# Patient Record
Sex: MALE | Race: WHITE | NOT HISPANIC OR LATINO | Employment: PART TIME | URBAN - METROPOLITAN AREA
[De-identification: names, ages, dates, MRNs, and addresses within clinical notes are randomized per-mention and may not be internally consistent; named-entity substitution may affect disease eponyms.]

---

## 2017-05-04 ENCOUNTER — APPOINTMENT (EMERGENCY)
Dept: RADIOLOGY | Facility: HOSPITAL | Age: 52
End: 2017-05-04
Payer: COMMERCIAL

## 2017-05-04 ENCOUNTER — HOSPITAL ENCOUNTER (EMERGENCY)
Facility: HOSPITAL | Age: 52
Discharge: HOME | End: 2017-05-04
Admitting: EMERGENCY MEDICINE
Payer: COMMERCIAL

## 2017-05-04 VITALS
HEART RATE: 80 BPM | DIASTOLIC BLOOD PRESSURE: 65 MMHG | WEIGHT: 210 LBS | OXYGEN SATURATION: 100 % | SYSTOLIC BLOOD PRESSURE: 122 MMHG | TEMPERATURE: 98.2 F | RESPIRATION RATE: 18 BRPM

## 2017-05-04 DIAGNOSIS — J20.9 BRONCHITIS WITH ASTHMA, ACUTE: Primary | ICD-10-CM

## 2017-05-04 DIAGNOSIS — J45.909 BRONCHITIS WITH ASTHMA, ACUTE: Primary | ICD-10-CM

## 2017-05-04 LAB
ANION GAP SERPL CALCULATED.3IONS-SCNC: 10 MMOL/L (ref 4–13)
BASOPHILS # BLD AUTO: 0.03 THOUSANDS/ΜL (ref 0–0.1)
BASOPHILS NFR BLD AUTO: 0 % (ref 0–1)
BUN SERPL-MCNC: 14 MG/DL (ref 5–25)
CALCIUM SERPL-MCNC: 9.3 MG/DL (ref 8.3–10.1)
CHLORIDE SERPL-SCNC: 103 MMOL/L (ref 100–108)
CO2 SERPL-SCNC: 26 MMOL/L (ref 21–32)
CREAT SERPL-MCNC: 1.06 MG/DL (ref 0.6–1.3)
EOSINOPHIL # BLD AUTO: 0.31 THOUSAND/ΜL (ref 0–0.61)
EOSINOPHIL NFR BLD AUTO: 3 % (ref 0–6)
ERYTHROCYTE [DISTWIDTH] IN BLOOD BY AUTOMATED COUNT: 14.4 % (ref 11.6–15.1)
GFR SERPL CREATININE-BSD FRML MDRD: >60 ML/MIN/1.73SQ M
GLUCOSE SERPL-MCNC: 109 MG/DL (ref 65–140)
HCT VFR BLD AUTO: 47.2 % (ref 36.5–49.3)
HGB BLD-MCNC: 15.7 G/DL (ref 12–17)
LYMPHOCYTES # BLD AUTO: 2.55 THOUSANDS/ΜL (ref 0.6–4.47)
LYMPHOCYTES NFR BLD AUTO: 27 % (ref 14–44)
MCH RBC QN AUTO: 28.3 PG (ref 26.8–34.3)
MCHC RBC AUTO-ENTMCNC: 33.3 G/DL (ref 31.4–37.4)
MCV RBC AUTO: 85 FL (ref 82–98)
MONOCYTES # BLD AUTO: 0.72 THOUSAND/ΜL (ref 0.17–1.22)
MONOCYTES NFR BLD AUTO: 8 % (ref 4–12)
NEUTROPHILS # BLD AUTO: 5.8 THOUSANDS/ΜL (ref 1.85–7.62)
NEUTS SEG NFR BLD AUTO: 62 % (ref 43–75)
PLATELET # BLD AUTO: 334 THOUSANDS/UL (ref 149–390)
PMV BLD AUTO: 8.9 FL (ref 8.9–12.7)
POTASSIUM SERPL-SCNC: 4.2 MMOL/L (ref 3.5–5.3)
RBC # BLD AUTO: 5.55 MILLION/UL (ref 3.88–5.62)
SODIUM SERPL-SCNC: 139 MMOL/L (ref 136–145)
WBC # BLD AUTO: 9.41 THOUSAND/UL (ref 4.31–10.16)

## 2017-05-04 PROCEDURE — 94640 AIRWAY INHALATION TREATMENT: CPT

## 2017-05-04 PROCEDURE — 36415 COLL VENOUS BLD VENIPUNCTURE: CPT | Performed by: PHYSICIAN ASSISTANT

## 2017-05-04 PROCEDURE — 99285 EMERGENCY DEPT VISIT HI MDM: CPT

## 2017-05-04 PROCEDURE — 85025 COMPLETE CBC W/AUTO DIFF WBC: CPT | Performed by: PHYSICIAN ASSISTANT

## 2017-05-04 PROCEDURE — 71020 HB CHEST X-RAY 2VW FRONTAL&LATL: CPT

## 2017-05-04 PROCEDURE — 80048 BASIC METABOLIC PNL TOTAL CA: CPT | Performed by: PHYSICIAN ASSISTANT

## 2017-05-04 RX ORDER — ALBUTEROL SULFATE 2.5 MG/3ML
5 SOLUTION RESPIRATORY (INHALATION) ONCE
Status: COMPLETED | OUTPATIENT
Start: 2017-05-04 | End: 2017-05-04

## 2017-05-04 RX ORDER — AZITHROMYCIN 250 MG/1
TABLET, FILM COATED ORAL
Qty: 6 TABLET | Refills: 0 | Status: SHIPPED | OUTPATIENT
Start: 2017-05-04 | End: 2017-08-22

## 2017-05-04 RX ORDER — PREDNISONE 50 MG/1
50 TABLET ORAL DAILY
Qty: 4 TABLET | Refills: 0 | Status: SHIPPED | OUTPATIENT
Start: 2017-05-04 | End: 2017-05-08

## 2017-05-04 RX ORDER — ALBUTEROL SULFATE 90 UG/1
2 AEROSOL, METERED RESPIRATORY (INHALATION) EVERY 6 HOURS PRN
COMMUNITY

## 2017-05-04 RX ORDER — BENZONATATE 200 MG/1
200 CAPSULE ORAL 3 TIMES DAILY PRN
Qty: 21 CAPSULE | Refills: 0 | Status: SHIPPED | OUTPATIENT
Start: 2017-05-04 | End: 2017-05-11

## 2017-05-04 RX ADMIN — ALBUTEROL SULFATE 5 MG: 2.5 SOLUTION RESPIRATORY (INHALATION) at 14:47

## 2017-05-04 RX ADMIN — ALBUTEROL SULFATE 5 MG: 2.5 SOLUTION RESPIRATORY (INHALATION) at 15:59

## 2017-05-04 RX ADMIN — IPRATROPIUM BROMIDE 0.5 MG: 0.5 SOLUTION RESPIRATORY (INHALATION) at 16:00

## 2017-05-04 RX ADMIN — PREDNISONE 50 MG: 20 TABLET ORAL at 14:47

## 2017-05-04 RX ADMIN — IPRATROPIUM BROMIDE 0.5 MG: 0.5 SOLUTION RESPIRATORY (INHALATION) at 14:48

## 2017-08-22 ENCOUNTER — HOSPITAL ENCOUNTER (EMERGENCY)
Facility: HOSPITAL | Age: 52
Discharge: HOME | End: 2017-08-22
Attending: EMERGENCY MEDICINE
Payer: COMMERCIAL

## 2017-08-22 VITALS
TEMPERATURE: 98.3 F | WEIGHT: 225.53 LBS | DIASTOLIC BLOOD PRESSURE: 66 MMHG | OXYGEN SATURATION: 96 % | RESPIRATION RATE: 18 BRPM | SYSTOLIC BLOOD PRESSURE: 147 MMHG | HEART RATE: 77 BPM

## 2017-08-22 DIAGNOSIS — J45.901 ACUTE ASTHMA EXACERBATION: Primary | ICD-10-CM

## 2017-08-22 PROCEDURE — 99285 EMERGENCY DEPT VISIT HI MDM: CPT

## 2017-08-22 RX ORDER — OMEPRAZOLE 40 MG/1
40 CAPSULE, DELAYED RELEASE ORAL DAILY
COMMUNITY

## 2017-08-22 RX ORDER — ALBUTEROL SULFATE 2.5 MG/3ML
2.5 SOLUTION RESPIRATORY (INHALATION) ONCE
Status: COMPLETED | OUTPATIENT
Start: 2017-08-22 | End: 2017-08-22

## 2017-08-22 RX ORDER — DIAZEPAM 5 MG/1
5 TABLET ORAL EVERY 6 HOURS PRN
Status: ON HOLD | COMMUNITY
End: 2021-02-26 | Stop reason: SDUPTHER

## 2017-08-22 RX ORDER — ALBUTEROL SULFATE 90 UG/1
2 AEROSOL, METERED RESPIRATORY (INHALATION) ONCE
Status: COMPLETED | OUTPATIENT
Start: 2017-08-22 | End: 2017-08-22

## 2017-08-22 RX ORDER — ACETAMINOPHEN 325 MG/1
TABLET ORAL
Status: COMPLETED
Start: 2017-08-22 | End: 2017-08-22

## 2017-08-22 RX ORDER — SODIUM CHLORIDE FOR INHALATION 0.9 %
VIAL, NEBULIZER (ML) INHALATION
Status: COMPLETED
Start: 2017-08-22 | End: 2017-08-22

## 2017-08-22 RX ORDER — PREDNISONE 10 MG/1
40 TABLET ORAL DAILY
Qty: 20 TABLET | Refills: 0 | Status: SHIPPED | OUTPATIENT
Start: 2017-08-22 | End: 2017-08-27

## 2017-08-22 RX ADMIN — ALBUTEROL SULFATE 2 PUFF: 90 AEROSOL, METERED RESPIRATORY (INHALATION) at 11:56

## 2017-08-22 RX ADMIN — ALBUTEROL SULFATE 2.5 MG: 2.5 SOLUTION RESPIRATORY (INHALATION) at 10:41

## 2017-08-22 RX ADMIN — ACETAMINOPHEN 650 MG: 325 TABLET ORAL at 11:33

## 2017-08-22 RX ADMIN — ISODIUM CHLORIDE 3 ML: 0.03 SOLUTION RESPIRATORY (INHALATION) at 10:41

## 2018-02-17 ENCOUNTER — HOSPITAL ENCOUNTER (EMERGENCY)
Facility: HOSPITAL | Age: 53
Discharge: HOME | End: 2018-02-17
Attending: EMERGENCY MEDICINE | Admitting: EMERGENCY MEDICINE
Payer: COMMERCIAL

## 2018-02-17 VITALS
SYSTOLIC BLOOD PRESSURE: 109 MMHG | DIASTOLIC BLOOD PRESSURE: 63 MMHG | RESPIRATION RATE: 20 BRPM | OXYGEN SATURATION: 98 % | WEIGHT: 227 LBS | HEART RATE: 68 BPM

## 2018-02-17 DIAGNOSIS — R06.1 STRIDOR: Primary | ICD-10-CM

## 2018-02-17 DIAGNOSIS — T78.2XXA ANAPHYLAXIS, INITIAL ENCOUNTER: ICD-10-CM

## 2018-02-17 LAB
ATRIAL RATE: 79 BPM
P AXIS: 38 DEGREES
PR INTERVAL: 152 MS
QRS AXIS: 18 DEGREES
QRSD INTERVAL: 88 MS
QT INTERVAL: 360 MS
QTC INTERVAL: 412 MS
T WAVE AXIS: 44 DEGREES
VENTRICULAR RATE: 79 BPM

## 2018-02-17 PROCEDURE — 93005 ELECTROCARDIOGRAM TRACING: CPT

## 2018-02-17 PROCEDURE — 96372 THER/PROPH/DIAG INJ SC/IM: CPT

## 2018-02-17 PROCEDURE — 94640 AIRWAY INHALATION TREATMENT: CPT

## 2018-02-17 PROCEDURE — 93010 ELECTROCARDIOGRAM REPORT: CPT | Performed by: INTERNAL MEDICINE

## 2018-02-17 PROCEDURE — 99285 EMERGENCY DEPT VISIT HI MDM: CPT

## 2018-02-17 RX ORDER — ACETAMINOPHEN 325 MG/1
650 TABLET ORAL ONCE
Status: COMPLETED | OUTPATIENT
Start: 2018-02-17 | End: 2018-02-17

## 2018-02-17 RX ORDER — ALBUTEROL SULFATE 2.5 MG/3ML
5 SOLUTION RESPIRATORY (INHALATION) ONCE
Status: COMPLETED | OUTPATIENT
Start: 2018-02-17 | End: 2018-02-17

## 2018-02-17 RX ORDER — PREDNISONE 20 MG/1
40 TABLET ORAL DAILY
Qty: 8 TABLET | Refills: 0 | Status: SHIPPED | OUTPATIENT
Start: 2018-02-17 | End: 2018-02-21

## 2018-02-17 RX ORDER — EPINEPHRINE 1 MG/ML
0.5 INJECTION, SOLUTION, CONCENTRATE INTRAVENOUS ONCE
Status: COMPLETED | OUTPATIENT
Start: 2018-02-17 | End: 2018-02-17

## 2018-02-17 RX ORDER — EPINEPHRINE 0.3 MG/.3ML
0.3 INJECTION SUBCUTANEOUS ONCE
Qty: 0.3 ML | Refills: 0 | Status: SHIPPED | OUTPATIENT
Start: 2018-02-17 | End: 2022-01-14

## 2018-02-17 RX ORDER — METHYLPREDNISOLONE SODIUM SUCCINATE 125 MG/2ML
INJECTION, POWDER, LYOPHILIZED, FOR SOLUTION INTRAMUSCULAR; INTRAVENOUS
Status: COMPLETED
Start: 2018-02-17 | End: 2018-02-17

## 2018-02-17 RX ADMIN — ACETAMINOPHEN 650 MG: 325 TABLET, FILM COATED ORAL at 13:31

## 2018-02-17 RX ADMIN — EPINEPHRINE 0.5 MG: 1 INJECTION, SOLUTION INTRAMUSCULAR; SUBCUTANEOUS at 11:32

## 2018-02-17 RX ADMIN — ALBUTEROL SULFATE 5 MG: 2.5 SOLUTION RESPIRATORY (INHALATION) at 11:30

## 2018-02-17 RX ADMIN — METHYLPREDNISOLONE SODIUM SUCCINATE 125 MG: 125 INJECTION, POWDER, FOR SOLUTION INTRAMUSCULAR; INTRAVENOUS at 11:37

## 2018-02-17 RX ADMIN — IPRATROPIUM BROMIDE 0.5 MG: 0.5 SOLUTION RESPIRATORY (INHALATION) at 11:30

## 2018-02-17 NOTE — ED NOTES
Patient noticed in waiting room, with flushed face while shaking inhaler  Patient asked if he was okay, states "No! My daughter needs to be seen  I told them that I was allergic to perfume and I can't be out here " Pt heavily wheezing  Patient requested to sit in wheelchair, refused stating "Not until my daughter goes back!" Pt told daughter would be going back with him, but he should be seen at a patient due to current respiratory status  Patient continues to refuse  Patient instructed to sit in wheelchair again and wheeled back with daughter to room 26 at this time         Cole Rosario RN  02/17/18 [id number]

## 2018-02-17 NOTE — DISCHARGE INSTRUCTIONS
Anaphylaxis   WHAT YOU NEED TO KNOW:   Anaphylaxis is a life-threatening allergic reaction that must be treated immediately  Your risk for anaphylaxis increases if you have asthma that is severe or not controlled  Medical conditions such as heart disease can also increase your risk  It is important to be prepared if you are at risk for anaphylaxis  Your symptoms can be worse each time you are exposed to the trigger  DISCHARGE INSTRUCTIONS:   Steps to take for signs or symptoms of anaphylaxis:   · Immediately  give 1 shot of epinephrine only into the outer thigh muscle  · Leave the shot in place  as directed  Your healthcare provider may recommend you leave it in place for up to 10 seconds before you remove it  This helps make sure all of the epinephrine is delivered  · Call 911 and go to the emergency department,  even if the shot improved symptoms  Do not drive yourself  Bring the used epinephrine shot with you  Call 911 for any of the following:   · You have a skin rash, hives, swelling, or itching  · You have trouble breathing, shortness of breath, wheezing, or coughing  · Your throat tightens or your lips or tongue swell  · You have difficulty swallowing or speaking  · You are dizzy, lightheaded, confused, or feel like you are going to faint  · You have nausea, diarrhea, or abdominal cramps, or you are vomiting  Return to the emergency department if:   · Signs or symptoms of anaphylaxis return  Contact your healthcare provider if:   · You have questions or concerns about your condition or care  Medicines:   · Epinephrine  is used to treat severe allergic reactions such as anaphylaxis  · Medicines  such as antihistamines, steroids, and bronchodilators decrease inflammation, open airways, and make breathing easier  · Take your medicine as directed  Contact your healthcare provider if you think your medicine is not helping or if you have side effects   Tell him or her if you are allergic to any medicine  Keep a list of the medicines, vitamins, and herbs you take  Include the amounts, and when and why you take them  Bring the list or the pill bottles to follow-up visits  Carry your medicine list with you in case of an emergency  Follow up with your healthcare provider as directed: Allergy testing may reveal allergies that can trigger anaphylaxis  Write down your questions so you remember to ask them during your visits  Safety precautions:   · Keep 2 shots of epinephrine with you at all times  You may need a second shot, because epinephrine only works for about 20 minutes and symptoms may return  Your healthcare provider can show you and family members how to give the shot  Check the expiration date every month and replace it before it expires  · Create an action plan  Your healthcare provider can help you create a written plan that explains the allergy and an emergency plan to treat a reaction  The plan explains when to give a second epinephrine shot if symptoms return or do not improve after the first  Give copies of the action plan and emergency instructions to family members, work and school staff, and  providers  Show them how to give a shot of epinephrine  · Be careful when you exercise  If you have had exercise-induced anaphylaxis, do not exercise right after you eat  Stop exercising right away if you start to develop any signs or symptoms of anaphylaxis  You may first feel tired, warm, or have itchy skin  Hives, swelling, and severe breathing problems may develop if you continue to exercise  · Carry medical alert identification  Wear medical alert jewelry or carry a card that explains the allergy  Ask your healthcare provider where to get these items  · Identify and avoid known triggers  Read food labels for ingredients  Look for triggers in your environment  · Ask about treatments to prevent anaphylaxis    You may need allergy shots or other medicines to treat allergies  © 2017 [facility] Information is for End User's use only and may not be sold, redistributed or otherwise used for commercial purposes  All illustrations and images included in CareNotes® are the copyrighted property of A D A M , Inc  or Yousif Huang  The above information is an  only  It is not intended as medical advice for individual conditions or treatments  Talk to your doctor, nurse or pharmacist before following any medical regimen to see if it is safe and effective for you
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
## 2018-08-01 ENCOUNTER — OFFICE VISIT (OUTPATIENT)
Dept: PULMONOLOGY | Facility: CLINIC | Age: 53
End: 2018-08-01
Payer: COMMERCIAL

## 2018-08-01 VITALS
SYSTOLIC BLOOD PRESSURE: 124 MMHG | WEIGHT: 218 LBS | BODY MASS INDEX: 33.04 KG/M2 | DIASTOLIC BLOOD PRESSURE: 72 MMHG | TEMPERATURE: 99.4 F | OXYGEN SATURATION: 98 % | HEIGHT: 68 IN | RESPIRATION RATE: 20 BRPM | HEART RATE: 94 BPM

## 2018-08-01 DIAGNOSIS — J45.901 EXACERBATION OF ASTHMA, UNSPECIFIED ASTHMA SEVERITY, UNSPECIFIED WHETHER PERSISTENT: Primary | ICD-10-CM

## 2018-08-01 DIAGNOSIS — J84.10 GRANULOMATOUS LUNG DISEASE (HCC): ICD-10-CM

## 2018-08-01 PROCEDURE — 94640 AIRWAY INHALATION TREATMENT: CPT | Performed by: INTERNAL MEDICINE

## 2018-08-01 PROCEDURE — 99204 OFFICE O/P NEW MOD 45 MIN: CPT | Performed by: INTERNAL MEDICINE

## 2018-08-01 RX ORDER — ALBUTEROL SULFATE 2.5 MG/3ML
2.5 SOLUTION RESPIRATORY (INHALATION) ONCE
Status: COMPLETED | OUTPATIENT
Start: 2018-08-01 | End: 2018-08-01

## 2018-08-01 RX ORDER — FLUTICASONE PROPIONATE 50 MCG
1 SPRAY, SUSPENSION (ML) NASAL DAILY
COMMUNITY

## 2018-08-01 RX ORDER — CETIRIZINE HYDROCHLORIDE 10 MG/1
10 TABLET ORAL DAILY
COMMUNITY

## 2018-08-01 RX ADMIN — ALBUTEROL SULFATE 2.5 MG: 2.5 SOLUTION RESPIRATORY (INHALATION) at 16:27
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
## 2018-08-08 ENCOUNTER — HOSPITAL ENCOUNTER (OUTPATIENT)
Dept: CT IMAGING | Facility: HOSPITAL | Age: 53
Discharge: HOME | End: 2018-08-08
Attending: INTERNAL MEDICINE
Payer: COMMERCIAL

## 2018-08-08 DIAGNOSIS — J84.10 GRANULOMATOUS LUNG DISEASE (HCC): ICD-10-CM

## 2018-08-08 PROCEDURE — 71250 CT THORAX DX C-: CPT

## 2018-08-14 ENCOUNTER — HOSPITAL ENCOUNTER (OUTPATIENT)
Dept: PULMONOLOGY | Facility: HOSPITAL | Age: 53
Discharge: HOME | End: 2018-08-14
Attending: INTERNAL MEDICINE
Payer: COMMERCIAL

## 2018-08-14 DIAGNOSIS — J84.10 GRANULOMATOUS LUNG DISEASE (HCC): ICD-10-CM

## 2018-08-14 PROCEDURE — 94729 DIFFUSING CAPACITY: CPT | Performed by: INTERNAL MEDICINE

## 2018-08-14 PROCEDURE — 94726 PLETHYSMOGRAPHY LUNG VOLUMES: CPT

## 2018-08-14 PROCEDURE — 94060 EVALUATION OF WHEEZING: CPT

## 2018-08-14 PROCEDURE — 94060 EVALUATION OF WHEEZING: CPT | Performed by: INTERNAL MEDICINE

## 2018-08-14 PROCEDURE — 94729 DIFFUSING CAPACITY: CPT

## 2018-08-14 PROCEDURE — 94760 N-INVAS EAR/PLS OXIMETRY 1: CPT

## 2018-08-14 PROCEDURE — 94726 PLETHYSMOGRAPHY LUNG VOLUMES: CPT | Performed by: INTERNAL MEDICINE

## 2018-08-14 RX ORDER — ALBUTEROL SULFATE 2.5 MG/3ML
2.5 SOLUTION RESPIRATORY (INHALATION) ONCE
Status: COMPLETED | OUTPATIENT
Start: 2018-08-14 | End: 2018-08-14

## 2018-08-14 RX ADMIN — ALBUTEROL SULFATE 2.5 MG: 2.5 SOLUTION RESPIRATORY (INHALATION) at 12:46

## 2018-09-05 ENCOUNTER — OFFICE VISIT (OUTPATIENT)
Dept: PULMONOLOGY | Facility: CLINIC | Age: 53
End: 2018-09-05
Payer: COMMERCIAL

## 2018-09-05 VITALS
HEART RATE: 81 BPM | DIASTOLIC BLOOD PRESSURE: 70 MMHG | WEIGHT: 216 LBS | TEMPERATURE: 98 F | BODY MASS INDEX: 31.99 KG/M2 | HEIGHT: 69 IN | SYSTOLIC BLOOD PRESSURE: 110 MMHG | RESPIRATION RATE: 18 BRPM | OXYGEN SATURATION: 96 %

## 2018-09-05 DIAGNOSIS — R09.89 PULMONARY AIR TRAPPING: Primary | ICD-10-CM

## 2018-09-05 DIAGNOSIS — J98.4 RESTRICTIVE LUNG DISEASE: ICD-10-CM

## 2018-09-05 DIAGNOSIS — J45.909 MILD REACTIVE AIRWAYS DISEASE, UNSPECIFIED WHETHER PERSISTENT: ICD-10-CM

## 2018-09-05 PROCEDURE — 99214 OFFICE O/P EST MOD 30 MIN: CPT | Performed by: INTERNAL MEDICINE
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
## 2018-09-06 PROBLEM — J45.909 MILD REACTIVE AIRWAYS DISEASE: Status: ACTIVE | Noted: 2018-09-06

## 2018-09-06 PROBLEM — J98.4 RESTRICTIVE LUNG DISEASE: Status: ACTIVE | Noted: 2018-09-06

## 2018-09-06 PROBLEM — R09.89 PULMONARY AIR TRAPPING: Status: ACTIVE | Noted: 2018-09-06

## 2018-10-15 ENCOUNTER — HOSPITAL ENCOUNTER (EMERGENCY)
Facility: HOSPITAL | Age: 53
Discharge: HOME | End: 2018-10-15
Admitting: EMERGENCY MEDICINE
Payer: COMMERCIAL

## 2018-10-15 ENCOUNTER — APPOINTMENT (EMERGENCY)
Dept: RADIOLOGY | Facility: HOSPITAL | Age: 53
End: 2018-10-15
Payer: COMMERCIAL

## 2018-10-15 VITALS
RESPIRATION RATE: 18 BRPM | HEART RATE: 86 BPM | BODY MASS INDEX: 32.93 KG/M2 | OXYGEN SATURATION: 97 % | TEMPERATURE: 98.5 F | DIASTOLIC BLOOD PRESSURE: 73 MMHG | SYSTOLIC BLOOD PRESSURE: 122 MMHG | WEIGHT: 223 LBS

## 2018-10-15 DIAGNOSIS — R07.89 CHEST PAIN, NON-CARDIAC: ICD-10-CM

## 2018-10-15 DIAGNOSIS — J45.901 ASTHMA ATTACK: ICD-10-CM

## 2018-10-15 DIAGNOSIS — J45.909 MILD REACTIVE AIRWAYS DISEASE, UNSPECIFIED WHETHER PERSISTENT: ICD-10-CM

## 2018-10-15 DIAGNOSIS — J98.4 RESTRICTIVE LUNG DISEASE: Primary | ICD-10-CM

## 2018-10-15 LAB
ALBUMIN SERPL BCP-MCNC: 3.9 G/DL (ref 3.5–5)
ALP SERPL-CCNC: 88 U/L (ref 46–116)
ALT SERPL W P-5'-P-CCNC: 56 U/L (ref 12–78)
ANION GAP SERPL CALCULATED.3IONS-SCNC: 6 MMOL/L (ref 4–13)
AST SERPL W P-5'-P-CCNC: 28 U/L (ref 5–45)
BASOPHILS # BLD AUTO: 0.04 THOUSANDS/ΜL (ref 0–0.1)
BASOPHILS NFR BLD AUTO: 0 % (ref 0–1)
BILIRUB SERPL-MCNC: 0.5 MG/DL (ref 0.2–1)
BUN SERPL-MCNC: 15 MG/DL (ref 5–25)
CALCIUM SERPL-MCNC: 9.2 MG/DL (ref 8.3–10.1)
CHLORIDE SERPL-SCNC: 101 MMOL/L (ref 100–108)
CO2 SERPL-SCNC: 29 MMOL/L (ref 21–32)
CREAT SERPL-MCNC: 1.1 MG/DL (ref 0.6–1.3)
DEPRECATED D DIMER PPP: 277 NG/ML (FEU) (ref 0–424)
EOSINOPHIL # BLD AUTO: 0.24 THOUSAND/ΜL (ref 0–0.61)
EOSINOPHIL NFR BLD AUTO: 2 % (ref 0–6)
ERYTHROCYTE [DISTWIDTH] IN BLOOD BY AUTOMATED COUNT: 14.3 % (ref 11.6–15.1)
GFR SERPL CREATININE-BSD FRML MDRD: 76 ML/MIN/1.73SQ M
GLUCOSE SERPL-MCNC: 97 MG/DL (ref 65–140)
HCT VFR BLD AUTO: 51.7 % (ref 36.5–49.3)
HGB BLD-MCNC: 16.5 G/DL (ref 12–17)
IMM GRANULOCYTES # BLD AUTO: 0.05 THOUSAND/UL (ref 0–0.2)
IMM GRANULOCYTES NFR BLD AUTO: 0 % (ref 0–2)
LIPASE SERPL-CCNC: 88 U/L (ref 73–393)
LYMPHOCYTES # BLD AUTO: 2.64 THOUSANDS/ΜL (ref 0.6–4.47)
LYMPHOCYTES NFR BLD AUTO: 20 % (ref 14–44)
MCH RBC QN AUTO: 28.2 PG (ref 26.8–34.3)
MCHC RBC AUTO-ENTMCNC: 31.9 G/DL (ref 31.4–37.4)
MCV RBC AUTO: 88 FL (ref 82–98)
MONOCYTES # BLD AUTO: 1.2 THOUSAND/ΜL (ref 0.17–1.22)
MONOCYTES NFR BLD AUTO: 9 % (ref 4–12)
NEUTROPHILS # BLD AUTO: 8.98 THOUSANDS/ΜL (ref 1.85–7.62)
NEUTS SEG NFR BLD AUTO: 69 % (ref 43–75)
NRBC BLD AUTO-RTO: 0 /100 WBCS
PLATELET # BLD AUTO: 367 THOUSANDS/UL (ref 149–390)
PMV BLD AUTO: 9 FL (ref 8.9–12.7)
POTASSIUM SERPL-SCNC: 4.1 MMOL/L (ref 3.5–5.3)
PROT SERPL-MCNC: 7.9 G/DL (ref 6.4–8.2)
RBC # BLD AUTO: 5.85 MILLION/UL (ref 3.88–5.62)
SODIUM SERPL-SCNC: 136 MMOL/L (ref 136–145)
TROPONIN I SERPL-MCNC: <0.02 NG/ML
WBC # BLD AUTO: 13.15 THOUSAND/UL (ref 4.31–10.16)

## 2018-10-15 PROCEDURE — 99285 EMERGENCY DEPT VISIT HI MDM: CPT

## 2018-10-15 PROCEDURE — 71045 X-RAY EXAM CHEST 1 VIEW: CPT

## 2018-10-15 PROCEDURE — 85379 FIBRIN DEGRADATION QUANT: CPT | Performed by: PHYSICIAN ASSISTANT

## 2018-10-15 PROCEDURE — 84484 ASSAY OF TROPONIN QUANT: CPT | Performed by: PHYSICIAN ASSISTANT

## 2018-10-15 PROCEDURE — 36415 COLL VENOUS BLD VENIPUNCTURE: CPT | Performed by: PHYSICIAN ASSISTANT

## 2018-10-15 PROCEDURE — 85025 COMPLETE CBC W/AUTO DIFF WBC: CPT | Performed by: PHYSICIAN ASSISTANT

## 2018-10-15 PROCEDURE — 83690 ASSAY OF LIPASE: CPT | Performed by: PHYSICIAN ASSISTANT

## 2018-10-15 PROCEDURE — 80053 COMPREHEN METABOLIC PANEL: CPT | Performed by: PHYSICIAN ASSISTANT

## 2018-10-15 PROCEDURE — 94760 N-INVAS EAR/PLS OXIMETRY 1: CPT

## 2018-10-15 PROCEDURE — 93005 ELECTROCARDIOGRAM TRACING: CPT

## 2018-10-15 PROCEDURE — 94644 CONT INHLJ TX 1ST HOUR: CPT

## 2018-10-15 PROCEDURE — 96374 THER/PROPH/DIAG INJ IV PUSH: CPT

## 2018-10-15 RX ORDER — SODIUM CHLORIDE FOR INHALATION 0.9 %
VIAL, NEBULIZER (ML) INHALATION
Status: COMPLETED
Start: 2018-10-15 | End: 2018-10-15

## 2018-10-15 RX ORDER — ALBUTEROL SULFATE 2.5 MG/3ML
SOLUTION RESPIRATORY (INHALATION)
Status: DISCONTINUED
Start: 2018-10-15 | End: 2018-10-16 | Stop reason: HOSPADM

## 2018-10-15 RX ORDER — ALBUTEROL SULFATE 2.5 MG/3ML
SOLUTION RESPIRATORY (INHALATION)
Status: COMPLETED
Start: 2018-10-15 | End: 2018-10-15

## 2018-10-15 RX ORDER — PREDNISONE 20 MG/1
20 TABLET ORAL DAILY
Qty: 4 TABLET | Refills: 0 | Status: SHIPPED | OUTPATIENT
Start: 2018-10-15 | End: 2020-02-17 | Stop reason: SDUPTHER

## 2018-10-15 RX ORDER — KETOROLAC TROMETHAMINE 30 MG/ML
15 INJECTION, SOLUTION INTRAMUSCULAR; INTRAVENOUS ONCE
Status: COMPLETED | OUTPATIENT
Start: 2018-10-15 | End: 2018-10-15

## 2018-10-15 RX ORDER — METHYLPREDNISOLONE SODIUM SUCCINATE 125 MG/2ML
125 INJECTION, POWDER, LYOPHILIZED, FOR SOLUTION INTRAMUSCULAR; INTRAVENOUS ONCE
Status: COMPLETED | OUTPATIENT
Start: 2018-10-15 | End: 2018-10-15

## 2018-10-15 RX ORDER — 0.9 % SODIUM CHLORIDE 0.9 %
3 VIAL (ML) INJECTION AS NEEDED
Status: DISCONTINUED | OUTPATIENT
Start: 2018-10-15 | End: 2018-10-16 | Stop reason: HOSPADM

## 2018-10-15 RX ORDER — SODIUM CHLORIDE FOR INHALATION 0.9 %
3 VIAL, NEBULIZER (ML) INHALATION ONCE
Status: COMPLETED | OUTPATIENT
Start: 2018-10-15 | End: 2018-10-15

## 2018-10-15 RX ORDER — METHYLPREDNISOLONE SODIUM SUCCINATE 125 MG/2ML
125 INJECTION, POWDER, LYOPHILIZED, FOR SOLUTION INTRAMUSCULAR; INTRAVENOUS ONCE
Status: DISCONTINUED | OUTPATIENT
Start: 2018-10-15 | End: 2018-10-15

## 2018-10-15 RX ADMIN — METHYLPREDNISOLONE SODIUM SUCCINATE 125 MG: 125 INJECTION, POWDER, FOR SOLUTION INTRAMUSCULAR; INTRAVENOUS at 20:57

## 2018-10-15 RX ADMIN — KETOROLAC TROMETHAMINE 15 MG: 30 INJECTION, SOLUTION INTRAMUSCULAR at 22:05

## 2018-10-15 RX ADMIN — ISODIUM CHLORIDE 3 ML: 0.03 SOLUTION RESPIRATORY (INHALATION) at 19:44

## 2018-10-15 RX ADMIN — Medication 1 MG: at 19:44

## 2018-10-15 RX ADMIN — ALBUTEROL SULFATE 10 MG: 2.5 SOLUTION RESPIRATORY (INHALATION) at 19:42

## 2018-10-15 RX ADMIN — ALBUTEROL SULFATE 10 MG: 2.5 SOLUTION RESPIRATORY (INHALATION) at 19:43

## 2018-10-15 RX ADMIN — Medication 3 ML: at 19:44

## 2018-10-15 RX ADMIN — IPRATROPIUM BROMIDE 1 MG: 0.5 SOLUTION RESPIRATORY (INHALATION) at 19:44

## 2018-10-15 NOTE — ED PROVIDER NOTES
History  Chief Complaint   Patient presents with    Chest Pain     pt c/o chest pain starting today  also c/o back pain  started with wheezing and SOB starting tonight while entering ED  history of allergies to perfume  HPI   Patient is a 48-year-old male who comes complaining of back pain and rib pain arm started with severe asthma attack due to perfume used in waiting room, patient had significant wheezing and was unable to speak sentences, accessory muscle use  Patient has perfumes reaction in his history  Immediately called respiratory gave hour long neb treatment and Solu-Medrol 125 mg IV x1, patient improved significantly in a short period of time  Patient is a living in medical history of pancreatitis PEs restrictive lung disease from 911 seen by Dr Kenzie Motley pulmonary, also has a history of asthma patient is compliant with his medication  Due to an accident he also has significant neck and back pain  Prior to Admission Medications   Prescriptions Last Dose Informant Patient Reported? Taking? Cyclobenzaprine HCl (FLEXERIL PO)   Yes No   Sig: Take by mouth   EPINEPHrine (EPIPEN) 0 3 mg/0 3 mL SOAJ   No No   Sig: Inject 0 3 mL (0 3 mg total) into the shoulder, thigh, or buttocks once for 1 dose   Ketorolac Tromethamine (TORADOL ORAL PO)   Yes No   Sig: Take by mouth   Oxycodone-Acetaminophen (PERCOCET PO)   Yes No   Sig: Take by mouth   albuterol (PROVENTIL HFA,VENTOLIN HFA) 90 mcg/act inhaler   Yes No   Sig: Inhale 2 puffs every 6 (six) hours as needed for wheezing   cetirizine (ZyrTEC) 10 mg tablet   Yes No   Sig: Take 10 mg by mouth daily   diazepam (VALIUM) 5 mg tablet   Yes No   Sig: Take 5 mg by mouth every 6 (six) hours as needed for anxiety   fluticasone (FLONASE) 50 mcg/act nasal spray   Yes No   Si spray into each nostril daily   fluticasone-salmeterol (ADVAIR) 250-50 mcg/dose inhaler   Yes No   Sig: Inhale 1 puff every 12 (twelve) hours     omeprazole (PriLOSEC) 40 MG capsule Yes No   Sig: Take 40 mg by mouth daily      Facility-Administered Medications: None       Past Medical History:   Diagnosis Date    Asthma     GERD (gastroesophageal reflux disease)     Pancreatitis     Pulmonary embolism (HCC)     Spinal cord injury        Past Surgical History:   Procedure Laterality Date    SHOULDER SURGERY         Family History   Problem Relation Age of Onset    Heart disease Paternal Grandmother      I have reviewed and agree with the history as documented  Social History   Substance Use Topics    Smoking status: Never Smoker    Smokeless tobacco: Never Used    Alcohol use Yes      Comment: Occasional        Review of Systems   Constitutional: Negative for appetite change, diaphoresis, fatigue and fever  HENT: Negative for congestion, facial swelling, rhinorrhea, sneezing and tinnitus  Eyes: Negative for photophobia, pain and discharge  Respiratory: Positive for chest tightness, shortness of breath and wheezing  Negative for apnea and cough  Cardiovascular: Negative for chest pain and leg swelling  Gastrointestinal: Negative for abdominal distention, abdominal pain, diarrhea, nausea and vomiting  Endocrine: Negative for cold intolerance, polydipsia and polyphagia  Genitourinary: Negative for enuresis, frequency and hematuria  Musculoskeletal: Negative for arthralgias, gait problem and myalgias  Skin: Negative for color change and rash  Allergic/Immunologic: Negative for environmental allergies and food allergies  Neurological: Negative for syncope, speech difficulty, light-headedness and headaches  Hematological: Negative for adenopathy  Does not bruise/bleed easily  Psychiatric/Behavioral: Negative for behavioral problems, decreased concentration and hallucinations  The patient is not hyperactive  Physical Exam  Physical Exam   Constitutional: He is oriented to person, place, and time  He appears well-developed and well-nourished   He appears distressed  HENT:   Head: Normocephalic and atraumatic  Eyes: Pupils are equal, round, and reactive to light  EOM are normal    Neck: Normal range of motion  Neck supple  Cardiovascular: Normal rate and regular rhythm  Pulmonary/Chest: He is in respiratory distress  He has wheezes  He exhibits tenderness  Abdominal: Soft  Bowel sounds are normal  There is no tenderness  There is no guarding  Genitourinary: Penis normal    Musculoskeletal: Normal range of motion  Neurological: He is alert and oriented to person, place, and time  Skin: Skin is warm  Capillary refill takes less than 2 seconds  Psychiatric: He has a normal mood and affect  His behavior is normal    Nursing note and vitals reviewed        Vital Signs  ED Triage Vitals   Temperature Pulse Respirations Blood Pressure SpO2   10/15/18 1937 10/15/18 1937 10/15/18 1937 10/15/18 1937 10/15/18 1937   98 5 °F (36 9 °C) 101 20 164/85 99 %      Temp src Heart Rate Source Patient Position - Orthostatic VS BP Location FiO2 (%)   -- 10/15/18 2050 10/15/18 2050 10/15/18 2050 --    Monitor Sitting Right arm       Pain Score       10/15/18 1937       3           Vitals:    10/15/18 1937 10/15/18 2050 10/15/18 2100 10/15/18 2204   BP: 164/85 131/85 133/89 122/73   Pulse: 101 89 82 86   Patient Position - Orthostatic VS:  Sitting Sitting Lying       Visual Acuity      ED Medications  Medications   ipratropium (ATROVENT) 0 02 % inhalation solution **ADS Override Pull** (  Return to [facility] 10/15/18 2017)   albuterol (2 5 mg/3 mL) 0 083 % inhalation solution **ADS Override Pull** (  Return to [facility] 10/15/18 2017)   sodium chloride (PF) 0 9 % injection 3 mL (not administered)   albuterol (2 5 mg/3 mL) 0 083 % inhalation solution **ADS Override Pull** (10 mg  Given 10/15/18 1942)   albuterol inhalation solution 10 mg (10 mg Nebulization Given 10/15/18 1943)     And   ipratropium (ATROVENT) 0 02 % inhalation solution 1 mg (1 mg Nebulization Given 10/15/18 1944)     And   sodium chloride 0 9 % inhalation solution 3 mL (3 mL Nebulization Given 10/15/18 1944)   methylPREDNISolone sodium succinate (Solu-MEDROL) injection 125 mg (125 mg Intravenous Given 10/15/18 2057)   ketorolac (TORADOL) injection 15 mg (15 mg Intravenous Given 10/15/18 2205)       Diagnostic Studies  Results Reviewed     Procedure Component Value Units Date/Time    Troponin I [[id number]]  (Normal) Collected:  10/15/18 1930    Lab Status:  Final result Specimen:  Blood from Arm, Left Updated:  10/15/18 2017     Troponin I <0 02 ng/mL     Lipase [[id number]]  (Normal) Collected:  10/15/18 1930    Lab Status:  Final result Specimen:  Blood from Arm, Left Updated:  10/15/18 2015     Lipase 88 u/L     Comprehensive metabolic panel [[id number]] Collected:  10/15/18 1930    Lab Status:  Final result Specimen:  Blood from Arm, Left Updated:  10/15/18 2015     Sodium 136 mmol/L      Potassium 4 1 mmol/L      Chloride 101 mmol/L      CO2 29 mmol/L      ANION GAP 6 mmol/L      BUN 15 mg/dL      Creatinine 1 10 mg/dL      Glucose 97 mg/dL      Calcium 9 2 mg/dL      AST 28 U/L      ALT 56 U/L      Alkaline Phosphatase 88 U/L      Total Protein 7 9 g/dL      Albumin 3 9 g/dL      Total Bilirubin 0 50 mg/dL      eGFR 76 ml/min/1 73sq m     Narrative:         National Kidney Disease Education Program recommendations are as follows:  GFR calculation is accurate only with a steady state creatinine  Chronic Kidney disease less than 60 ml/min/1 73 sq  meters  Kidney failure less than 15 ml/min/1 73 sq  meters      D-dimer, quantitative [[id number]]  (Normal) Collected:  10/15/18 1930    Lab Status:  Final result Specimen:  Blood from Arm, Left Updated:  10/15/18 2013     D-Dimer, Quant 277 ng/ml (FEU)     CBC and differential [[id number]]  (Abnormal) Collected:  10/15/18 1930    Lab Status:  Final result Specimen:  Blood from Arm, Left Updated:  10/15/18 1958     WBC 13 15 (H) Thousand/uL      RBC 5 85 (H) Million/uL Hemoglobin 16 5 g/dL      Hematocrit 51 7 (H) %      MCV 88 fL      MCH 28 2 pg      MCHC 31 9 g/dL      RDW 14 3 %      MPV 9 0 fL      Platelets 661 Thousands/uL      nRBC 0 /100 WBCs      Neutrophils Relative 69 %      Immat GRANS % 0 %      Lymphocytes Relative 20 %      Monocytes Relative 9 %      Eosinophils Relative 2 %      Basophils Relative 0 %      Neutrophils Absolute 8 98 (H) Thousands/µL      Immature Grans Absolute 0 05 Thousand/uL      Lymphocytes Absolute 2 64 Thousands/µL      Monocytes Absolute 1 20 Thousand/µL      Eosinophils Absolute 0 24 Thousand/µL      Basophils Absolute 0 04 Thousands/µL                  XR chest portable    (Results Pending)              Procedures  Procedures       Phone Contacts  ED Phone Contact    ED Course  ED Course as of Oct 15 2220   Mon Oct 15, 2018   2019 D-DIMER QUANTITATIVE: 277   2020 D-DIMER QUANTITATIVE: [facility] D-DIMER QUANTITATIVE: 277      reassessment of patient after hour long treatment - room air oxygenation was 96-97% kept patient for another hour no respiratory compromise stated he feels fine to go home                               MDM  Number of Diagnoses or Management Options  Asthma attack: established and improving  Chest pain, non-cardiac: established and improving  Mild reactive airways disease, unspecified whether persistent: established and improving  Restrictive lung disease: established and improving     Amount and/or Complexity of Data Reviewed  Clinical lab tests: reviewed  Tests in the radiology section of CPT®: reviewed  Tests in the medicine section of CPT®: reviewed  Decide to obtain previous medical records or to obtain history from someone other than the patient: yes  Review and summarize past medical records: yes      CritCare Time    Disposition  Final diagnoses:   Restrictive lung disease   Asthma attack   Chest pain, non-cardiac   Mild reactive airways disease, unspecified whether persistent     Time reflects when diagnosis was documented in both MDM as applicable and the Disposition within this note     Time User Action Codes Description Comment    10/15/2018  9:56 PM Leia Hazard Add [J98 4] Restrictive lung disease     10/15/2018  9:56 PM Jewel Sánchez Add [J45 901] Asthma attack     10/15/2018  9:56 PM Jewel Sánchez Add [R07 89] Chest pain, non-cardiac     10/15/2018  9:59 PM Jewel Sánchez Add [J45 909] Mild reactive airways disease, unspecified whether persistent       ED Disposition     ED Disposition Condition Comment    Discharge  Donato Adler discharge to home/self care  Condition at discharge: Good        Follow-up Information     Follow up With Specialties Details Why [address], DO Internal Medicine Schedule an appointment as soon as possible for a visit in 2 days If symptoms worsen [address] [place] [address] [id number] [phone]            Patient's Medications   Discharge Prescriptions    PREDNISONE 20 MG TABLET    Take 1 tablet (20 mg total) by mouth daily       Start Date: 10/15/2018End Date: --       Order Dose: 20 mg       Quantity: 4 tablet    Refills: 0     No discharge procedures on file      ED Provider  Electronically Signed by           Andres Dupree PA-C  10/15/18 9269

## 2018-10-16 LAB
ATRIAL RATE: 83 BPM
P AXIS: 36 DEGREES
PR INTERVAL: 150 MS
QRS AXIS: 5 DEGREES
QRSD INTERVAL: 88 MS
QT INTERVAL: 342 MS
QTC INTERVAL: 401 MS
T WAVE AXIS: 49 DEGREES
VENTRICULAR RATE: 83 BPM

## 2018-10-16 PROCEDURE — 93010 ELECTROCARDIOGRAM REPORT: CPT | Performed by: INTERNAL MEDICINE

## 2018-10-16 NOTE — DISCHARGE INSTRUCTIONS
Asthma   WHAT YOU NEED TO KNOW:   Asthma is a lung disease that makes breathing difficult  Chronic inflammation and reactions to triggers narrow the airways in the lungs  Asthma can become life-threatening if it is not managed  DISCHARGE INSTRUCTIONS:   Return to the emergency department if:   · You have severe shortness of breath  · Your lips or nails turn blue or gray  · The skin around your neck and ribs pulls in with each breath  · You have shortness of breath, even after you take your short-term medicine as directed  · Your peak flow numbers are in the red zone of your AAP  Contact your healthcare provider if:   · You run out of medicine before your next refill is due  · Your symptoms get worse  · You need to take more medicine than usual to control your symptoms  · You have questions or concerns about your condition or care  Medicines:   · Medicines  decrease inflammation, open airways, and make it easier to breathe  Medicines may be inhaled, taken as a pill, or injected  Short-term medicines relieve your symptoms quickly  Long-term medicines are used to prevent future attacks  You may also need medicine to help control your allergies  Ask your healthcare provider for more information about the medicine you are given and how to take it safely  · Take your medicine as directed  Contact your healthcare provider if you think your medicine is not helping or if you have side effects  Tell him of her if you are allergic to any medicine  Keep a list of the medicines, vitamins, and herbs you take  Include the amounts, and when and why you take them  Bring the list or the pill bottles to follow-up visits  Carry your medicine list with you in case of an emergency  Follow up with your healthcare provider as directed: You will need to return to make sure your medicine is working and your symptoms are controlled   You may be referred to an asthma specialist  Kriss Upton may be asked to keep a record of your peak flow values and bring it with you to your appointments  Write down your questions so you remember to ask them during your visits  Manage your symptoms and prevent future attacks:   · Follow your Asthma Action Plan (AAP)  This is a written plan that you and your healthcare provider create  It explains which medicine you need and when to change doses if necessary  It also explains how you can monitor symptoms and use a peak flow meter  The meter measures how well your lungs are working  · Manage other health conditions , such as allergies, acid reflux, and sleep apnea  · Identify and avoid triggers  These may include pets, dust mites, mold, and cockroaches  · Do not smoke or be around others who smoke  Nicotine and other chemicals in cigarettes and cigars can cause lung damage  Ask your healthcare provider for information if you currently smoke and need help to quit  E-cigarettes or smokeless tobacco still contain nicotine  Talk to your healthcare provider before you use these products  · Ask about the flu vaccine  The flu can make your asthma worse  You may need a yearly flu shot  © 2017 [facility] Information is for End User's use only and may not be sold, redistributed or otherwise used for commercial purposes  All illustrations and images included in CareNotes® are the copyrighted property of [employer]  or [employer]  The above information is an  only  It is not intended as medical advice for individual conditions or treatments  Talk to your doctor, nurse or pharmacist before following any medical regimen to see if it is safe and effective for you  Chest Wall Pain   WHAT YOU NEED TO KNOW:   Chest wall pain may be caused by problems with the muscles, cartilage, or bones of the chest wall  Chest wall pain may also be caused by pain that spreads to your chest from another part of your body   The pain may be aching, severe, dull, or sharp  It may come and go, or it may be constant  The pain may be worse when you move in certain ways, breathe deeply, or cough  DISCHARGE INSTRUCTIONS:   Call 911 if:   · You have any of the following signs of a heart attack:      ¨ Squeezing, pressure, or pain in your chest that lasts longer than 5 minutes or returns    ¨ Discomfort or pain in your back, neck, jaw, stomach, or arm     ¨ Trouble breathing    ¨ Nausea or vomiting    ¨ Lightheadedness or a sudden cold sweat, especially with chest pain or trouble breathing    Return to the emergency department if:   · You have severe pain  Contact your healthcare provider if:   · You develop a rash  · You have other new symptoms  · Your pain does not improve, even with treatment  · You have questions or concerns about your condition or care  Medicines: You may need any of the following:  · NSAIDs , such as ibuprofen, help decrease swelling, pain, and fever  This medicine is available with or without a doctor's order  NSAIDs can cause stomach bleeding or kidney problems in certain people  If you take blood thinner medicine, always ask your healthcare provider if NSAIDs are safe for you  Always read the medicine label and follow directions  · Acetaminophen  decreases pain  It is available without a doctor's order  Ask how much to take and how often to take it  Follow directions  Acetaminophen can cause liver damage if not taken correctly  · A cream  may be applied to your chest to decrease pain  · Take your medicine as directed  Contact your healthcare provider if you think your medicine is not helping or if you have side effects  Tell him of her if you are allergic to any medicine  Keep a list of the medicines, vitamins, and herbs you take  Include the amounts, and when and why you take them  Bring the list or the pill bottles to follow-up visits  Carry your medicine list with you in case of an emergency    Follow up with your healthcare provider as directed:  Write down your questions so you remember to ask them during your visits  Self-care:   · Rest  as needed  Avoid activities that make your chest wall pain worse  · Apply heat  on your chest for 20 to 30 minutes every 2 hours for as many days as directed  Heat helps decrease pain and muscle spasms  · Apply ice  on your chest for 15 to 20 minutes every hour or as directed  Use an ice pack, or put crushed ice in a plastic bag  Cover it with a towel  Ice helps prevent tissue damage and decreases swelling and pain  © 2017 [facility] Information is for End User's use only and may not be sold, redistributed or otherwise used for commercial purposes  All illustrations and images included in CareNotes® are the copyrighted property of [employer]  or Yousif Huang  The above information is an  only  It is not intended as medical advice for individual conditions or treatments  Talk to your doctor, nurse or pharmacist before following any medical regimen to see if it is safe and effective for you

## 2019-09-08 ENCOUNTER — HOSPITAL ENCOUNTER (EMERGENCY)
Facility: HOSPITAL | Age: 54
Discharge: HOME | End: 2019-09-08
Attending: EMERGENCY MEDICINE
Payer: COMMERCIAL

## 2019-09-08 ENCOUNTER — APPOINTMENT (EMERGENCY)
Dept: CT IMAGING | Facility: HOSPITAL | Age: 54
End: 2019-09-08
Payer: COMMERCIAL

## 2019-09-08 VITALS
HEART RATE: 66 BPM | DIASTOLIC BLOOD PRESSURE: 80 MMHG | TEMPERATURE: 98 F | SYSTOLIC BLOOD PRESSURE: 150 MMHG | WEIGHT: 225.97 LBS | OXYGEN SATURATION: 98 % | RESPIRATION RATE: 20 BRPM | BODY MASS INDEX: 33.37 KG/M2

## 2019-09-08 DIAGNOSIS — R10.84 GENERALIZED ABDOMINAL PAIN: Primary | ICD-10-CM

## 2019-09-08 DIAGNOSIS — M54.50 NON-SPECIFIC LOW BACK PAIN: ICD-10-CM

## 2019-09-08 LAB
ALBUMIN SERPL BCP-MCNC: 3.6 G/DL (ref 3.5–5)
ALP SERPL-CCNC: 80 U/L (ref 46–116)
ALT SERPL W P-5'-P-CCNC: 36 U/L (ref 12–78)
ANION GAP SERPL CALCULATED.3IONS-SCNC: 7 MMOL/L (ref 4–13)
AST SERPL W P-5'-P-CCNC: 19 U/L (ref 5–45)
BASOPHILS # BLD AUTO: 0.05 THOUSANDS/ΜL (ref 0–0.1)
BASOPHILS NFR BLD AUTO: 1 % (ref 0–1)
BILIRUB SERPL-MCNC: 0.4 MG/DL (ref 0.2–1)
BILIRUB UR QL STRIP: NEGATIVE
BUN SERPL-MCNC: 13 MG/DL (ref 5–25)
CALCIUM SERPL-MCNC: 8.9 MG/DL (ref 8.3–10.1)
CHLORIDE SERPL-SCNC: 101 MMOL/L (ref 100–108)
CLARITY UR: CLEAR
CO2 SERPL-SCNC: 28 MMOL/L (ref 21–32)
COLOR UR: NORMAL
CREAT SERPL-MCNC: 1.11 MG/DL (ref 0.6–1.3)
EOSINOPHIL # BLD AUTO: 0.35 THOUSAND/ΜL (ref 0–0.61)
EOSINOPHIL NFR BLD AUTO: 3 % (ref 0–6)
ERYTHROCYTE [DISTWIDTH] IN BLOOD BY AUTOMATED COUNT: 14.4 % (ref 11.6–15.1)
GFR SERPL CREATININE-BSD FRML MDRD: 74 ML/MIN/1.73SQ M
GLUCOSE SERPL-MCNC: 103 MG/DL (ref 65–140)
GLUCOSE UR STRIP-MCNC: NEGATIVE MG/DL
HCT VFR BLD AUTO: 46.7 % (ref 36.5–49.3)
HGB BLD-MCNC: 15.2 G/DL (ref 12–17)
HGB UR QL STRIP.AUTO: NEGATIVE
IMM GRANULOCYTES # BLD AUTO: 0.03 THOUSAND/UL (ref 0–0.2)
IMM GRANULOCYTES NFR BLD AUTO: 0 % (ref 0–2)
KETONES UR STRIP-MCNC: NEGATIVE MG/DL
LEUKOCYTE ESTERASE UR QL STRIP: NEGATIVE
LIPASE SERPL-CCNC: 68 U/L (ref 73–393)
LYMPHOCYTES # BLD AUTO: 2.26 THOUSANDS/ΜL (ref 0.6–4.47)
LYMPHOCYTES NFR BLD AUTO: 21 % (ref 14–44)
MCH RBC QN AUTO: 29 PG (ref 26.8–34.3)
MCHC RBC AUTO-ENTMCNC: 32.5 G/DL (ref 31.4–37.4)
MCV RBC AUTO: 89 FL (ref 82–98)
MONOCYTES # BLD AUTO: 1 THOUSAND/ΜL (ref 0.17–1.22)
MONOCYTES NFR BLD AUTO: 9 % (ref 4–12)
NEUTROPHILS # BLD AUTO: 6.96 THOUSANDS/ΜL (ref 1.85–7.62)
NEUTS SEG NFR BLD AUTO: 66 % (ref 43–75)
NITRITE UR QL STRIP: NEGATIVE
NRBC BLD AUTO-RTO: 0 /100 WBCS
PH UR STRIP.AUTO: 7 [PH]
PLATELET # BLD AUTO: 311 THOUSANDS/UL (ref 149–390)
PMV BLD AUTO: 8.8 FL (ref 8.9–12.7)
POTASSIUM SERPL-SCNC: 4 MMOL/L (ref 3.5–5.3)
PROT SERPL-MCNC: 7.5 G/DL (ref 6.4–8.2)
PROT UR STRIP-MCNC: NEGATIVE MG/DL
RBC # BLD AUTO: 5.25 MILLION/UL (ref 3.88–5.62)
SODIUM SERPL-SCNC: 136 MMOL/L (ref 136–145)
SP GR UR STRIP.AUTO: <=1.005 (ref 1–1.03)
UROBILINOGEN UR QL STRIP.AUTO: 0.2 E.U./DL
WBC # BLD AUTO: 10.65 THOUSAND/UL (ref 4.31–10.16)

## 2019-09-08 PROCEDURE — 36415 COLL VENOUS BLD VENIPUNCTURE: CPT | Performed by: EMERGENCY MEDICINE

## 2019-09-08 PROCEDURE — 96361 HYDRATE IV INFUSION ADD-ON: CPT

## 2019-09-08 PROCEDURE — 81003 URINALYSIS AUTO W/O SCOPE: CPT | Performed by: EMERGENCY MEDICINE

## 2019-09-08 PROCEDURE — 96374 THER/PROPH/DIAG INJ IV PUSH: CPT

## 2019-09-08 PROCEDURE — 85025 COMPLETE CBC W/AUTO DIFF WBC: CPT | Performed by: EMERGENCY MEDICINE

## 2019-09-08 PROCEDURE — 83690 ASSAY OF LIPASE: CPT | Performed by: EMERGENCY MEDICINE

## 2019-09-08 PROCEDURE — 99284 EMERGENCY DEPT VISIT MOD MDM: CPT

## 2019-09-08 PROCEDURE — 74177 CT ABD & PELVIS W/CONTRAST: CPT

## 2019-09-08 PROCEDURE — 80053 COMPREHEN METABOLIC PANEL: CPT | Performed by: EMERGENCY MEDICINE

## 2019-09-08 PROCEDURE — 99284 EMERGENCY DEPT VISIT MOD MDM: CPT | Performed by: EMERGENCY MEDICINE

## 2019-09-08 PROCEDURE — 93005 ELECTROCARDIOGRAM TRACING: CPT

## 2019-09-08 RX ORDER — LIDOCAINE 50 MG/G
1 PATCH TOPICAL ONCE
Status: DISCONTINUED | OUTPATIENT
Start: 2019-09-08 | End: 2019-09-09 | Stop reason: HOSPADM

## 2019-09-08 RX ORDER — SODIUM CHLORIDE, SODIUM LACTATE, POTASSIUM CHLORIDE, CALCIUM CHLORIDE 600; 310; 30; 20 MG/100ML; MG/100ML; MG/100ML; MG/100ML
500 INJECTION, SOLUTION INTRAVENOUS CONTINUOUS
Status: DISCONTINUED | OUTPATIENT
Start: 2019-09-08 | End: 2019-09-09 | Stop reason: HOSPADM

## 2019-09-08 RX ORDER — KETOROLAC TROMETHAMINE 30 MG/ML
10 INJECTION, SOLUTION INTRAMUSCULAR; INTRAVENOUS ONCE
Status: COMPLETED | OUTPATIENT
Start: 2019-09-08 | End: 2019-09-08

## 2019-09-08 RX ADMIN — SODIUM CHLORIDE, SODIUM LACTATE, POTASSIUM CHLORIDE, AND CALCIUM CHLORIDE 500 ML: .6; .31; .03; .02 INJECTION, SOLUTION INTRAVENOUS at 21:55

## 2019-09-08 RX ADMIN — IOHEXOL 100 ML: 350 INJECTION, SOLUTION INTRAVENOUS at 22:16

## 2019-09-08 RX ADMIN — LIDOCAINE 1 PATCH: 50 PATCH TOPICAL at 21:32

## 2019-09-08 RX ADMIN — KETOROLAC TROMETHAMINE 9.9 MG: 30 INJECTION, SOLUTION INTRAMUSCULAR at 23:48

## 2019-09-09 LAB
ATRIAL RATE: 62 BPM
P AXIS: 43 DEGREES
PR INTERVAL: 156 MS
QRS AXIS: 17 DEGREES
QRSD INTERVAL: 90 MS
QT INTERVAL: 372 MS
QTC INTERVAL: 377 MS
T WAVE AXIS: 58 DEGREES
VENTRICULAR RATE: 62 BPM

## 2019-09-09 PROCEDURE — 93010 ELECTROCARDIOGRAM REPORT: CPT | Performed by: INTERNAL MEDICINE

## 2019-09-09 NOTE — ED PROCEDURE NOTE
PROCEDURE  ECG 12 Lead Documentation Only  Date/Time: 9/8/2019 10:01 PM  Performed by: Allan Berry MD  Authorized by: Allan Berry MD     Indications / Diagnosis:  UPPER ABD PAIN   ECG reviewed by me, the ED Provider: yes    Patient location:  ED and bedside  Previous ECG:     Previous ECG:  Compared to current    Comparison ECG info:  10/15/18    Similarity:  No change    Comparison to cardiac monitor: Yes    Interpretation:     Interpretation: non-specific    Rate:     ECG rate:  62    ECG rate assessment: normal    Rhythm:     Rhythm: sinus rhythm    Ectopy:     Ectopy: none    QRS:     QRS axis:  Normal    QRS intervals:  Normal  Conduction:     Conduction: normal    ST segments:     ST segments:  Normal  T waves:     T waves: flattening      Flattening:  AVL and V1  Other findings:     Other findings: U wave    Comments:      NO ECG SIGNS OF ISCHEMIA/ INJURY / R HEART STRAIN / Sandra Means MD  09/08/19 5079

## 2019-09-09 NOTE — ED PROVIDER NOTES
History  Chief Complaint   Patient presents with    Abdominal Pain     Pt  complains of generalized abdominal pain that radiates to back, started yesterday  LBM today  Denies N/V/D or difficulty urinating  53 YR MALE- C/O ACUTE O NSET OF GENERALIZED  MORE CONSTANT ABD CARINE N SINE YESTERDAY AT 3 PM -- WITH RADIATION TO BACK- MORE RIGHT LOWER BACK-- NO FEVERS- NO N/V/D- NO  COMPS- NO CP/SOB- NO PLEURITIC NATURE OF PAIN CC      History provided by:  Patient   used: No    Abdominal Pain   Pain location:  Generalized  Associated symptoms: no constipation, no diarrhea, no nausea and no vomiting        Prior to Admission Medications   Prescriptions Last Dose Informant Patient Reported? Taking? Cyclobenzaprine HCl (FLEXERIL PO)   Yes No   Sig: Take by mouth   EPINEPHrine (EPIPEN) 0 3 mg/0 3 mL SOAJ   No No   Sig: Inject 0 3 mL (0 3 mg total) into the shoulder, thigh, or buttocks once for 1 dose   Ketorolac Tromethamine (TORADOL ORAL PO)   Yes No   Sig: Take by mouth   Oxycodone-Acetaminophen (PERCOCET PO)   Yes No   Sig: Take by mouth   albuterol (PROVENTIL HFA,VENTOLIN HFA) 90 mcg/act inhaler   Yes No   Sig: Inhale 2 puffs every 6 (six) hours as needed for wheezing   cetirizine (ZyrTEC) 10 mg tablet   Yes No   Sig: Take 10 mg by mouth daily   diazepam (VALIUM) 5 mg tablet   Yes No   Sig: Take 5 mg by mouth every 6 (six) hours as needed for anxiety   fluticasone (FLONASE) 50 mcg/act nasal spray   Yes No   Si spray into each nostril daily   fluticasone-salmeterol (ADVAIR) 250-50 mcg/dose inhaler   Yes No   Sig: Inhale 1 puff every 12 (twelve) hours     omeprazole (PriLOSEC) 40 MG capsule   Yes No   Sig: Take 40 mg by mouth daily   predniSONE 20 mg tablet   No No   Sig: Take 1 tablet (20 mg total) by mouth daily      Facility-Administered Medications: None       Past Medical History:   Diagnosis Date    Asthma     GERD (gastroesophageal reflux disease)     Pancreatitis     Pulmonary embolism ([facility] [place] [address] )     Spinal cord injury        Past Surgical History:   Procedure Laterality Date    SHOULDER SURGERY         Family History   Problem Relation Age of Onset    Heart disease Paternal Grandmother      I have reviewed and agree with the history as documented  Social History     Tobacco Use    Smoking status: Never Smoker    Smokeless tobacco: Never Used   Substance Use Topics    Alcohol use: Yes     Comment: Occasional    Drug use: No        Review of Systems   Constitutional: Negative  HENT: Negative  Eyes: Negative  Respiratory: Negative  Cardiovascular: Negative  Gastrointestinal: Positive for abdominal pain  Negative for abdominal distention, anal bleeding, blood in stool, constipation, diarrhea, nausea, rectal pain and vomiting  Endocrine: Negative  Genitourinary: Negative  Musculoskeletal: Positive for back pain  Negative for arthralgias, gait problem, joint swelling, myalgias, neck pain and neck stiffness  Skin: Negative  Allergic/Immunologic: Negative  Neurological: Negative  Hematological: Negative  Psychiatric/Behavioral: Negative  Physical Exam  Physical Exam   Constitutional: He is oriented to person, place, and time  He appears well-developed and well-nourished  Non-toxic appearance  He does not appear ill  No distress  AVSS- PULSE OX 97 % ON RA- INTERPRETATION IS NORMAL- NO INTERVENTION- WELL APPEARING- IN NAD    HENT:   Head: Normocephalic and atraumatic  Eyes: Pupils are equal, round, and reactive to light  EOM are normal  No scleral icterus  MM PINK   Cardiovascular: Normal rate, regular rhythm, normal heart sounds and intact distal pulses  Exam reveals no gallop and no friction rub  No murmur heard  EQUAL BILATERAL RADALA/DP PULSES- NO BLE EDEMA/CALF TENDERNESS/ASYM/ ERYTHEMA   Pulmonary/Chest: Effort normal and breath sounds normal  No stridor  No respiratory distress  He has no wheezes  He has no rhonchi  He has no rales  He exhibits no tenderness  Abdominal: Soft  Normal appearance, normal aorta and bowel sounds are normal  He exhibits no distension, no pulsatile liver, no fluid wave, no abdominal bruit, no ascites, no pulsatile midline mass and no mass  There is no hepatosplenomegaly, splenomegaly or hepatomegaly  There is tenderness in the right lower quadrant, periumbilical area, suprapubic area and left lower quadrant  There is no rigidity, no rebound, no guarding, no CVA tenderness, no tenderness at McBurney's point and negative Barnes's sign  No hernia  Hernia confirmed negative in the ventral area, confirmed negative in the right inguinal area and confirmed negative in the left inguinal area  POS SUPRAUMBILICAL/ RLQ/LLQ/SUPRAPUBIC TENDERNESS- NO PERITONEAL SIGNS- NO C VA TENDERNESS- NO PULSATILE ABD MASS/BRUIT    - POS RIGHT LOWER PARASPINAL MUSCLE- LUMBAR TENDERNESS   Genitourinary: Testes normal and penis normal  Cremasteric reflex is present  Right testis shows no mass, no swelling and no tenderness  Left testis shows no mass, no swelling and no tenderness  Genitourinary Comments: NORMAL TESTICLE/SCROTLA/PERINEAL EXAM   Neurological: He is alert and oriented to person, place, and time  Skin: Skin is warm  Capillary refill takes less than 2 seconds  No rash noted  He is not diaphoretic  No cyanosis or erythema  No pallor  Psychiatric: He has a normal mood and affect  His behavior is normal    Nursing note and vitals reviewed        Vital Signs  ED Triage Vitals [09/08/19 2050]   Temperature Pulse Respirations Blood Pressure SpO2   98 °F (36 7 °C) 74 18 136/86 97 %      Temp Source Heart Rate Source Patient Position - Orthostatic VS BP Location FiO2 (%)   Oral Monitor Sitting Right arm --      Pain Score       6           Vitals:    09/08/19 2050   BP: 136/86   Pulse: 74   Patient Position - Orthostatic VS: Sitting         Visual Acuity      ED Medications  Medications   lidocaine (LIDODERM) 5 % patch 1 patch (1 patch Topical Medication Applied 9/8/19 2132)   lactated ringers infusion 500 mL (500 mL Intravenous New Bag 9/8/19 2155)   iohexol (OMNIPAQUE) 350 MG/ML injection (MULTI-DOSE) 100 mL (100 mL Intravenous Given 9/8/19 2216)       Diagnostic Studies  Results Reviewed     Procedure Component Value Units Date/Time    Comprehensive metabolic panel [[id number]] Collected:  09/08/19 2115    Lab Status:  Final result Specimen:  Blood from Arm, Right Updated:  09/08/19 2136     Sodium 136 mmol/L      Potassium 4 0 mmol/L      Chloride 101 mmol/L      CO2 28 mmol/L      ANION GAP 7 mmol/L      BUN 13 mg/dL      Creatinine 1 11 mg/dL      Glucose 103 mg/dL      Calcium 8 9 mg/dL      AST 19 U/L      ALT 36 U/L      Alkaline Phosphatase 80 U/L      Total Protein 7 5 g/dL      Albumin 3 6 g/dL      Total Bilirubin 0 40 mg/dL      eGFR 74 ml/min/1 73sq m     Narrative:       [place] guidelines for Chronic Kidney Disease (CKD):     Stage 1 with normal or high GFR (GFR > 90 mL/min/1 73 square meters)    Stage 2 Mild CKD (GFR = 60-89 mL/min/1 73 square meters)    Stage 3A Moderate CKD (GFR = 45-59 mL/min/1 73 square meters)    Stage 3B Moderate CKD (GFR = 30-44 mL/min/1 73 square meters)    Stage 4 Severe CKD (GFR = 15-29 mL/min/1 73 square meters)    Stage 5 End Stage CKD (GFR <15 mL/min/1 73 square meters)  Note: GFR calculation is accurate only with a steady state creatinine    Lipase [[id number]]  (Abnormal) Collected:  09/08/19 2115    Lab Status:  Final result Specimen:  Blood from Arm, Right Updated:  09/08/19 2136     Lipase 68 u/L     CBC and differential [[id number]]  (Abnormal) Collected:  09/08/19 2115    Lab Status:  Final result Specimen:  Blood from Arm, Right Updated:  09/08/19 2122     WBC 10 65 Thousand/uL      RBC 5 25 Million/uL      Hemoglobin 15 2 g/dL      Hematocrit 46 7 %      MCV 89 fL      MCH 29 0 pg      MCHC 32 5 g/dL      RDW 14 4 %      MPV 8 8 fL      Platelets 126 Thousands/uL      nRBC 0 /100 WBCs      Neutrophils Relative 66 %      Immat GRANS % 0 %      Lymphocytes Relative 21 %      Monocytes Relative 9 %      Eosinophils Relative 3 %      Basophils Relative 1 %      Neutrophils Absolute 6 96 Thousands/µL      Immature Grans Absolute 0 03 Thousand/uL      Lymphocytes Absolute 2 26 Thousands/µL      Monocytes Absolute 1 00 Thousand/µL      Eosinophils Absolute 0 35 Thousand/µL      Basophils Absolute 0 05 Thousands/µL     UA w Reflex to Microscopic [[id number]]     Lab Status:  No result Specimen:  Urine                  CT abdomen pelvis with contrast    (Results Pending)              Procedures  Procedures       ED Course  ED Course as of Sep 11 1310   Sun Sep 08, 2019   2107 Er md note-- pt offered pain medication - refuses at this point      2316 - ER MD NOTE- PT IS ASKING FOR IV TORADOL PRIOR TO ER D/C- WILL ORDER                                   MDM    Disposition  Final diagnoses:   None     ED Disposition     None      Follow-up Information    None         Patient's Medications   Discharge Prescriptions    No medications on file     No discharge procedures on file      ED Provider  Electronically Signed by           Igor Randolph MD  09/08/19 6957       Igor Randolph MD  09/11/19 7553

## 2019-09-09 NOTE — DISCHARGE INSTRUCTIONS
DIAGNOSIS; MID- LOWER ABDOMINAL PAIN // RIGHT LOW BACK PAIN     - ACTIVITY AS TOLERATED     - PLEASE RETURN TO  THE ER FOR ANY FEVERS- TEMP > 100 4/ ANY CHEST PAIN/ SHORTNESS OF BREATH/ ANY WORSENING ABDOMINAL PAIN / ANY  PERSISTENT VOMITING/ ANY BLOODY/COFFEE GROUND VOMITING/ ANY BLOODY/BLACK TARRY STOOLS  OR ANY NEW/ WORSENING/CONCERNING SYMPTOMS TO YOU

## 2020-02-17 ENCOUNTER — HOSPITAL ENCOUNTER (EMERGENCY)
Facility: HOSPITAL | Age: 55
Discharge: HOME | End: 2020-02-17
Attending: EMERGENCY MEDICINE | Admitting: EMERGENCY MEDICINE
Payer: COMMERCIAL

## 2020-02-17 ENCOUNTER — APPOINTMENT (EMERGENCY)
Dept: RADIOLOGY | Facility: HOSPITAL | Age: 55
End: 2020-02-17
Payer: COMMERCIAL

## 2020-02-17 VITALS
SYSTOLIC BLOOD PRESSURE: 127 MMHG | DIASTOLIC BLOOD PRESSURE: 89 MMHG | TEMPERATURE: 100 F | HEIGHT: 69 IN | HEART RATE: 108 BPM | BODY MASS INDEX: 33.47 KG/M2 | RESPIRATION RATE: 18 BRPM | OXYGEN SATURATION: 93 % | WEIGHT: 226 LBS

## 2020-02-17 DIAGNOSIS — J11.1 INFLUENZA: Primary | ICD-10-CM

## 2020-02-17 DIAGNOSIS — J45.901 ASTHMA EXACERBATION: ICD-10-CM

## 2020-02-17 LAB
ALBUMIN SERPL BCP-MCNC: 3.6 G/DL (ref 3.5–5)
ALP SERPL-CCNC: 73 U/L (ref 46–116)
ALT SERPL W P-5'-P-CCNC: 47 U/L (ref 12–78)
ANION GAP SERPL CALCULATED.3IONS-SCNC: 7 MMOL/L (ref 4–13)
AST SERPL W P-5'-P-CCNC: 31 U/L (ref 5–45)
ATRIAL RATE: 94 BPM
BASOPHILS # BLD AUTO: 0.03 THOUSANDS/ΜL (ref 0–0.1)
BASOPHILS NFR BLD AUTO: 1 % (ref 0–1)
BILIRUB SERPL-MCNC: 0.51 MG/DL (ref 0.2–1)
BUN SERPL-MCNC: 10 MG/DL (ref 5–25)
CALCIUM SERPL-MCNC: 8.6 MG/DL (ref 8.3–10.1)
CHLORIDE SERPL-SCNC: 98 MMOL/L (ref 100–108)
CO2 SERPL-SCNC: 27 MMOL/L (ref 21–32)
CREAT SERPL-MCNC: 1.11 MG/DL (ref 0.6–1.3)
EOSINOPHIL # BLD AUTO: 0.02 THOUSAND/ΜL (ref 0–0.61)
EOSINOPHIL NFR BLD AUTO: 0 % (ref 0–6)
ERYTHROCYTE [DISTWIDTH] IN BLOOD BY AUTOMATED COUNT: 14.7 % (ref 11.6–15.1)
FLUAV RNA NPH QL NAA+PROBE: DETECTED
FLUBV RNA NPH QL NAA+PROBE: ABNORMAL
GFR SERPL CREATININE-BSD FRML MDRD: 74 ML/MIN/1.73SQ M
GLUCOSE SERPL-MCNC: 95 MG/DL (ref 65–140)
HCT VFR BLD AUTO: 46.7 % (ref 36.5–49.3)
HGB BLD-MCNC: 15.1 G/DL (ref 12–17)
IMM GRANULOCYTES # BLD AUTO: 0.03 THOUSAND/UL (ref 0–0.2)
IMM GRANULOCYTES NFR BLD AUTO: 1 % (ref 0–2)
LIPASE SERPL-CCNC: 41 U/L (ref 73–393)
LYMPHOCYTES # BLD AUTO: 0.49 THOUSANDS/ΜL (ref 0.6–4.47)
LYMPHOCYTES NFR BLD AUTO: 8 % (ref 14–44)
MCH RBC QN AUTO: 28.7 PG (ref 26.8–34.3)
MCHC RBC AUTO-ENTMCNC: 32.3 G/DL (ref 31.4–37.4)
MCV RBC AUTO: 89 FL (ref 82–98)
MONOCYTES # BLD AUTO: 0.57 THOUSAND/ΜL (ref 0.17–1.22)
MONOCYTES NFR BLD AUTO: 9 % (ref 4–12)
NEUTROPHILS # BLD AUTO: 5.31 THOUSANDS/ΜL (ref 1.85–7.62)
NEUTS SEG NFR BLD AUTO: 81 % (ref 43–75)
NRBC BLD AUTO-RTO: 0 /100 WBCS
NT-PROBNP SERPL-MCNC: 95 PG/ML
P AXIS: 52 DEGREES
PLATELET # BLD AUTO: 241 THOUSANDS/UL (ref 149–390)
PMV BLD AUTO: 8.7 FL (ref 8.9–12.7)
POTASSIUM SERPL-SCNC: 4.1 MMOL/L (ref 3.5–5.3)
PR INTERVAL: 148 MS
PROT SERPL-MCNC: 7.6 G/DL (ref 6.4–8.2)
QRS AXIS: 33 DEGREES
QRSD INTERVAL: 92 MS
QT INTERVAL: 312 MS
QTC INTERVAL: 390 MS
RBC # BLD AUTO: 5.27 MILLION/UL (ref 3.88–5.62)
RSV RNA NPH QL NAA+PROBE: ABNORMAL
SODIUM SERPL-SCNC: 132 MMOL/L (ref 136–145)
T WAVE AXIS: 49 DEGREES
TROPONIN I SERPL-MCNC: <0.02 NG/ML
VENTRICULAR RATE: 94 BPM
WBC # BLD AUTO: 6.45 THOUSAND/UL (ref 4.31–10.16)

## 2020-02-17 PROCEDURE — 99285 EMERGENCY DEPT VISIT HI MDM: CPT

## 2020-02-17 PROCEDURE — 71045 X-RAY EXAM CHEST 1 VIEW: CPT

## 2020-02-17 PROCEDURE — 85025 COMPLETE CBC W/AUTO DIFF WBC: CPT | Performed by: EMERGENCY MEDICINE

## 2020-02-17 PROCEDURE — 96374 THER/PROPH/DIAG INJ IV PUSH: CPT

## 2020-02-17 PROCEDURE — 99283 EMERGENCY DEPT VISIT LOW MDM: CPT | Performed by: EMERGENCY MEDICINE

## 2020-02-17 PROCEDURE — 36415 COLL VENOUS BLD VENIPUNCTURE: CPT | Performed by: EMERGENCY MEDICINE

## 2020-02-17 PROCEDURE — 94760 N-INVAS EAR/PLS OXIMETRY 1: CPT

## 2020-02-17 PROCEDURE — 83690 ASSAY OF LIPASE: CPT | Performed by: EMERGENCY MEDICINE

## 2020-02-17 PROCEDURE — 87631 RESP VIRUS 3-5 TARGETS: CPT | Performed by: EMERGENCY MEDICINE

## 2020-02-17 PROCEDURE — 84484 ASSAY OF TROPONIN QUANT: CPT | Performed by: EMERGENCY MEDICINE

## 2020-02-17 PROCEDURE — 83880 ASSAY OF NATRIURETIC PEPTIDE: CPT | Performed by: EMERGENCY MEDICINE

## 2020-02-17 PROCEDURE — 80053 COMPREHEN METABOLIC PANEL: CPT | Performed by: EMERGENCY MEDICINE

## 2020-02-17 PROCEDURE — 94644 CONT INHLJ TX 1ST HOUR: CPT

## 2020-02-17 PROCEDURE — 93005 ELECTROCARDIOGRAM TRACING: CPT

## 2020-02-17 PROCEDURE — 96375 TX/PRO/DX INJ NEW DRUG ADDON: CPT

## 2020-02-17 PROCEDURE — 93010 ELECTROCARDIOGRAM REPORT: CPT | Performed by: INTERNAL MEDICINE

## 2020-02-17 RX ORDER — SODIUM CHLORIDE FOR INHALATION 0.9 %
3 VIAL, NEBULIZER (ML) INHALATION ONCE
Status: COMPLETED | OUTPATIENT
Start: 2020-02-17 | End: 2020-02-17

## 2020-02-17 RX ORDER — PREDNISONE 20 MG/1
40 TABLET ORAL DAILY
Qty: 8 TABLET | Refills: 0 | Status: SHIPPED | OUTPATIENT
Start: 2020-02-18 | End: 2020-02-22

## 2020-02-17 RX ORDER — OSELTAMIVIR PHOSPHATE 75 MG/1
75 CAPSULE ORAL ONCE
Status: COMPLETED | OUTPATIENT
Start: 2020-02-17 | End: 2020-02-17

## 2020-02-17 RX ORDER — METHYLPREDNISOLONE SODIUM SUCCINATE 125 MG/2ML
125 INJECTION, POWDER, LYOPHILIZED, FOR SOLUTION INTRAMUSCULAR; INTRAVENOUS ONCE
Status: COMPLETED | OUTPATIENT
Start: 2020-02-17 | End: 2020-02-17

## 2020-02-17 RX ORDER — OSELTAMIVIR PHOSPHATE 75 MG/1
75 CAPSULE ORAL EVERY 12 HOURS
Qty: 10 CAPSULE | Refills: 0 | Status: SHIPPED | OUTPATIENT
Start: 2020-02-17 | End: 2020-02-22

## 2020-02-17 RX ORDER — ONDANSETRON 4 MG/1
4 TABLET, ORALLY DISINTEGRATING ORAL EVERY 8 HOURS PRN
Qty: 20 TABLET | Refills: 0 | Status: SHIPPED | OUTPATIENT
Start: 2020-02-17

## 2020-02-17 RX ORDER — KETOROLAC TROMETHAMINE 30 MG/ML
15 INJECTION, SOLUTION INTRAMUSCULAR; INTRAVENOUS ONCE
Status: COMPLETED | OUTPATIENT
Start: 2020-02-17 | End: 2020-02-17

## 2020-02-17 RX ADMIN — OSELTAMIVIR PHOSPHATE 75 MG: 75 CAPSULE ORAL at 16:26

## 2020-02-17 RX ADMIN — ISODIUM CHLORIDE 3 ML: 0.03 SOLUTION RESPIRATORY (INHALATION) at 14:52

## 2020-02-17 RX ADMIN — METHYLPREDNISOLONE SODIUM SUCCINATE 125 MG: 125 INJECTION, POWDER, FOR SOLUTION INTRAMUSCULAR; INTRAVENOUS at 15:04

## 2020-02-17 RX ADMIN — KETOROLAC TROMETHAMINE 15 MG: 30 INJECTION, SOLUTION INTRAMUSCULAR at 16:23

## 2020-02-17 RX ADMIN — ALBUTEROL SULFATE 10 MG: 2.5 SOLUTION RESPIRATORY (INHALATION) at 14:52

## 2020-02-17 RX ADMIN — IPRATROPIUM BROMIDE 1 MG: 0.5 SOLUTION RESPIRATORY (INHALATION) at 14:53

## 2020-02-17 NOTE — ED PROVIDER NOTES
History  Chief Complaint   Patient presents with    Shortness of Breath     Pt reports 2 day hx of shortness of breath, reports asthma medications are not releiving symptoms  Pt reports "shallow tight" breahing  History provided by:  Patient   used: No    Shortness of Breath   Severity:  Moderate  Onset quality:  Gradual  Duration:  2 days  Timing:  Intermittent  Progression:  Waxing and waning  Chronicity:  New  Context: URI    Relieved by:  None tried  Worsened by:  Nothing  Ineffective treatments:  None tried  Associated symptoms: cough and wheezing    Associated symptoms: no abdominal pain, no chest pain, no fever, no neck pain, no rash and no sore throat    Risk factors: hx of PE/DVT        Prior to Admission Medications   Prescriptions Last Dose Informant Patient Reported? Taking? Cyclobenzaprine HCl (FLEXERIL PO)   Yes No   Sig: Take by mouth   EPINEPHrine (EPIPEN) 0 3 mg/0 3 mL SOAJ   No No   Sig: Inject 0 3 mL (0 3 mg total) into the shoulder, thigh, or buttocks once for 1 dose   Ketorolac Tromethamine (TORADOL ORAL PO)   Yes No   Sig: Take by mouth   Oxycodone-Acetaminophen (PERCOCET PO)   Yes No   Sig: Take by mouth   albuterol (PROVENTIL HFA,VENTOLIN HFA) 90 mcg/act inhaler   Yes No   Sig: Inhale 2 puffs every 6 (six) hours as needed for wheezing   cetirizine (ZyrTEC) 10 mg tablet   Yes No   Sig: Take 10 mg by mouth daily   diazepam (VALIUM) 5 mg tablet   Yes No   Sig: Take 5 mg by mouth every 6 (six) hours as needed for anxiety   fluticasone (FLONASE) 50 mcg/act nasal spray   Yes No   Si spray into each nostril daily   fluticasone-salmeterol (ADVAIR) 250-50 mcg/dose inhaler   Yes No   Sig: Inhale 1 puff every 12 (twelve) hours     omeprazole (PriLOSEC) 40 MG capsule   Yes No   Sig: Take 40 mg by mouth daily      Facility-Administered Medications: None       Past Medical History:   Diagnosis Date    Asthma     GERD (gastroesophageal reflux disease)     Pancreatitis  Pulmonary embolism ([facility] [address] )     Spinal cord injury        Past Surgical History:   Procedure Laterality Date    SHOULDER SURGERY         Family History   Problem Relation Age of Onset    Heart disease Paternal Grandmother      I have reviewed and agree with the history as documented  Social History     Tobacco Use    Smoking status: Never Smoker    Smokeless tobacco: Never Used   Substance Use Topics    Alcohol use: Yes     Comment: Occasional    Drug use: No       Review of Systems   Constitutional: Negative for activity change, appetite change, chills, fatigue and fever  HENT: Negative for congestion, dental problem, facial swelling, sore throat, tinnitus and trouble swallowing  Eyes: Negative for pain, discharge and itching  Respiratory: Positive for cough, shortness of breath and wheezing  Negative for apnea and chest tightness  Cardiovascular: Negative for chest pain, palpitations and leg swelling  Gastrointestinal: Negative for abdominal pain and nausea  Genitourinary: Negative for difficulty urinating, dysuria and flank pain  Musculoskeletal: Negative for arthralgias, back pain, gait problem, joint swelling and neck pain  Skin: Negative for color change, rash and wound  Neurological: Negative for dizziness and facial asymmetry  Psychiatric/Behavioral: Negative for agitation and behavioral problems  The patient is not nervous/anxious  All other systems reviewed and are negative  Physical Exam  Physical Exam   Constitutional: He is oriented to person, place, and time  He appears well-developed and well-nourished  No distress  HENT:   Head: Normocephalic and atraumatic  Right Ear: External ear normal    Left Ear: External ear normal    Eyes: Pupils are equal, round, and reactive to light  EOM are normal  Right eye exhibits no discharge  Left eye exhibits no discharge  Neck: Normal range of motion  Neck supple  No tracheal deviation present  No thyromegaly present  Cardiovascular: Normal rate and regular rhythm  No murmur heard  Pulmonary/Chest: Effort normal  He has wheezes  Abdominal: Soft  Bowel sounds are normal  He exhibits no distension  There is no tenderness  Musculoskeletal: Normal range of motion  He exhibits no edema or deformity  Right lower leg: He exhibits no edema  Left lower leg: He exhibits no edema  Neurological: He is alert and oriented to person, place, and time  No cranial nerve deficit  He exhibits normal muscle tone  Skin: Skin is warm  Capillary refill takes less than 2 seconds  He is not diaphoretic  Psychiatric: He has a normal mood and affect  His behavior is normal    Nursing note and vitals reviewed        Vital Signs  ED Triage Vitals   Temperature Pulse Respirations Blood Pressure SpO2   02/17/20 1232 02/17/20 1232 02/17/20 1232 02/17/20 1232 02/17/20 1232   100 °F (37 8 °C) (!) 106 20 137/77 95 %      Temp Source Heart Rate Source Patient Position - Orthostatic VS BP Location FiO2 (%)   02/17/20 1232 02/17/20 1508 02/17/20 1508 02/17/20 1508 --   Oral Monitor Sitting Right arm       Pain Score       02/17/20 1232       2           Vitals:    02/17/20 1508 02/17/20 1530 02/17/20 1600 02/17/20 1625   BP: 138/74 124/66 125/62 127/89   Pulse: 97 (!) 110 102 (!) 108   Patient Position - Orthostatic VS: Sitting Lying Lying Lying         Visual Acuity      ED Medications  Medications   albuterol inhalation solution 10 mg (10 mg Nebulization Given 2/17/20 1452)     And   ipratropium (ATROVENT) 0 02 % inhalation solution 1 mg (1 mg Nebulization Given 2/17/20 1453)     And   sodium chloride 0 9 % inhalation solution 3 mL (3 mL Nebulization Given 2/17/20 1452)   methylPREDNISolone sodium succinate (Solu-MEDROL) injection 125 mg (125 mg Intravenous Given 2/17/20 1504)   ketorolac (TORADOL) injection 15 mg (15 mg Intravenous Given 2/17/20 1623)   oseltamivir (TAMIFLU) capsule 75 mg (75 mg Oral Given 2/17/20 1626)       Diagnostic Studies  Results Reviewed     Procedure Component Value Units Date/Time    Influenza A/B and RSV PCR [[id number]]  (Abnormal) Collected:  02/17/20 1451    Lab Status:  Final result Specimen:  Nasopharyngeal Swab Updated:  02/17/20 1531     INFLUENZA A PCR Detected     INFLUENZA B PCR None Detected     RSV PCR None Detected    NT-BNP PRO [[id number]]  (Normal) Collected:  02/17/20 1448    Lab Status:  Final result Specimen:  Blood from Arm, Left Updated:  02/17/20 1518     NT-proBNP 95 pg/mL     Lipase [[id number]]  (Abnormal) Collected:  02/17/20 1448    Lab Status:  Final result Specimen:  Blood from Arm, Left Updated:  02/17/20 1518     Lipase 41 u/L     Comprehensive metabolic panel [[id number]]  (Abnormal) Collected:  02/17/20 1448    Lab Status:  Final result Specimen:  Blood from Arm, Left Updated:  02/17/20 1515     Sodium 132 mmol/L      Potassium 4 1 mmol/L      Chloride 98 mmol/L      CO2 27 mmol/L      ANION GAP 7 mmol/L      BUN 10 mg/dL      Creatinine 1 11 mg/dL      Glucose 95 mg/dL      Calcium 8 6 mg/dL      AST 31 U/L      ALT 47 U/L      Alkaline Phosphatase 73 U/L      Total Protein 7 6 g/dL      Albumin 3 6 g/dL      Total Bilirubin 0 51 mg/dL      eGFR 74 ml/min/1 73sq m     Narrative:       [place] guidelines for Chronic Kidney Disease (CKD):     Stage 1 with normal or high GFR (GFR > 90 mL/min/1 73 square meters)    Stage 2 Mild CKD (GFR = 60-89 mL/min/1 73 square meters)    Stage 3A Moderate CKD (GFR = 45-59 mL/min/1 73 square meters)    Stage 3B Moderate CKD (GFR = 30-44 mL/min/1 73 square meters)    Stage 4 Severe CKD (GFR = 15-29 mL/min/1 73 square meters)    Stage 5 End Stage CKD (GFR <15 mL/min/1 73 square meters)  Note: GFR calculation is accurate only with a steady state creatinine    Troponin I [[id number]]  (Normal) Collected:  02/17/20 1448    Lab Status:  Final result Specimen:  Blood from Arm, Left Updated:  02/17/20 1513     Troponin I <0 02 ng/mL CBC and differential [[id number]]  (Abnormal) Collected:  02/17/20 1448    Lab Status:  Final result Specimen:  Blood from Arm, Left Updated:  02/17/20 1458     WBC 6 45 Thousand/uL      RBC 5 27 Million/uL      Hemoglobin 15 1 g/dL      Hematocrit 46 7 %      MCV 89 fL      MCH 28 7 pg      MCHC 32 3 g/dL      RDW 14 7 %      MPV 8 7 fL      Platelets 692 Thousands/uL      nRBC 0 /100 WBCs      Neutrophils Relative 81 %      Immat GRANS % 1 %      Lymphocytes Relative 8 %      Monocytes Relative 9 %      Eosinophils Relative 0 %      Basophils Relative 1 %      Neutrophils Absolute 5 31 Thousands/µL      Immature Grans Absolute 0 03 Thousand/uL      Lymphocytes Absolute 0 49 Thousands/µL      Monocytes Absolute 0 57 Thousand/µL      Eosinophils Absolute 0 02 Thousand/µL      Basophils Absolute 0 03 Thousands/µL                  XR chest 1 view portable   Final Result by Rob Andre MD (02/17 1601)      Small left basilar atelectasis/infiltrate               Workstation performed: [id number]                    Procedures  ECG 12 Lead Documentation Only  Date/Time: 2/17/2020 4:38 PM  Performed by: Osmel Centeno MD  Authorized by: Osmel Centeno MD     Indications / Diagnosis:  Tachycardia  ECG reviewed by me, the ED Provider: yes    Patient location:  ED  Interpretation:     Interpretation: non-specific    Rate:     ECG rate:  94    ECG rate assessment: normal    Rhythm:     Rhythm: sinus rhythm    Ectopy:     Ectopy: none    QRS:     QRS axis:  Normal    QRS intervals:  Normal  Conduction:     Conduction: normal    ST segments:     ST segments:  Normal  T waves:     T waves: normal               ED Course                         Wells' Criteria for PE      Most Recent Value   Wells' Criteria for PE   Clinical signs and symptoms of DVT  0 Filed at: 02/17/2020 1516   PE is primary diagnosis or equally likely  3 Filed at: 02/17/2020 1516   HR >100  1 5 Filed at: 02/17/2020 1516   Immobilization at least 3 days or Surgery in the previous 4 weeks  0 Filed at: 02/17/2020 1516   Previous, objectively diagnosed PE or DVT  1 5 Filed at: 02/17/2020 1516   Hemoptysis  0 Filed at: 02/17/2020 1516   Malignancy with treatment within 6 months or palliative  0 Filed at: 02/17/2020 1516   Wells' Criteria Total  6 Filed at: 02/17/2020 1516            MDM  Number of Diagnoses or Management Options  Asthma exacerbation: new and requires workup  Influenza: new and requires workup  Diagnosis management comments: Two days of cough, congestion, wheezing  Not improved with home medications  History of asthma  Remote history of PE  No chest pain at this time  Initially tachycardic and borderline febrile  Initially ordered CTA chest due to elevated well score in setting of previous PE  Subsequently, laboratory studies revealed influenza positive  Chest x-ray obtained which showed no clear pneumonia  No leukocytosis  Suspect symptoms related to viral infections setting of asthma  He was given nebulizer treatment as well as prednisone and Tamiflu  I had extensive discussion with patient  Diffuse wheezing  Alternative diagnosis, cancelled CT PE after shared decision making with patient  Five days Tamiflu, prednisone recommended  Ibuprofen/tylenol for fever/body aches           Amount and/or Complexity of Data Reviewed  Clinical lab tests: ordered and reviewed  Tests in the radiology section of CPT®: ordered  Tests in the medicine section of CPT®: ordered and reviewed    Risk of Complications, Morbidity, and/or Mortality  Presenting problems: high  Diagnostic procedures: moderate  Management options: high    Patient Progress  Patient progress: improved        Disposition  Final diagnoses:   Influenza   Asthma exacerbation     Time reflects when diagnosis was documented in both MDM as applicable and the Disposition within this note     Time User Action Codes Description Comment    2/17/2020  4:28 PM Prudencio Oakes Add [J11 1] Influenza     2/17/2020  4:28 PM Stewart Warren Add [W53 005] Asthma exacerbation       ED Disposition     ED Disposition Condition Date/Time Comment    Discharge Stable Mon Feb 17, 2020  4:22 PM Donato Adler discharge to home/self care  Follow-up Information     Follow up With Specialties Details Why Contact Info    Rogelio Rojas DO Internal Medicine, Family Medicine Schedule an appointment as soon as possible for a visit in 3 days As needed [id number] [facility]  [address] [address] [phone]            Patient's Medications   Discharge Prescriptions    ONDANSETRON (ZOFRAN-ODT) 4 MG DISINTEGRATING TABLET    Take 1 tablet (4 mg total) by mouth every 8 (eight) hours as needed for nausea or vomiting       Start Date: 2/17/2020 End Date: --       Order Dose: 4 mg       Quantity: 20 tablet    Refills: 0    OSELTAMIVIR (TAMIFLU) 75 MG CAPSULE    Take 1 capsule (75 mg total) by mouth every 12 (twelve) hours for 5 days       Start Date: 2/17/2020 End Date: 2/22/2020       Order Dose: 75 mg       Quantity: 10 capsule    Refills: 0    PREDNISONE 20 MG TABLET    Take 2 tablets (40 mg total) by mouth daily for 4 days       Start Date: 2/18/2020 End Date: 2/22/2020       Order Dose: 40 mg       Quantity: 8 tablet    Refills: 0     No discharge procedures on file      PDMP Review     None          ED Provider  Electronically Signed by           Martin Johnson MD  02/17/20 8039

## 2020-06-09 ENCOUNTER — APPOINTMENT (EMERGENCY)
Dept: CT IMAGING | Facility: HOSPITAL | Age: 55
End: 2020-06-09
Payer: COMMERCIAL

## 2020-06-09 ENCOUNTER — HOSPITAL ENCOUNTER (EMERGENCY)
Facility: HOSPITAL | Age: 55
Discharge: HOME | End: 2020-06-09
Attending: EMERGENCY MEDICINE | Admitting: EMERGENCY MEDICINE
Payer: COMMERCIAL

## 2020-06-09 VITALS
RESPIRATION RATE: 16 BRPM | HEART RATE: 56 BPM | SYSTOLIC BLOOD PRESSURE: 112 MMHG | OXYGEN SATURATION: 98 % | TEMPERATURE: 98.3 F | DIASTOLIC BLOOD PRESSURE: 71 MMHG

## 2020-06-09 DIAGNOSIS — K62.5 RECTAL BLEEDING: ICD-10-CM

## 2020-06-09 DIAGNOSIS — K52.9 ACUTE COLITIS: Primary | ICD-10-CM

## 2020-06-09 LAB
ABO GROUP BLD: NORMAL
ALBUMIN SERPL BCP-MCNC: 3.5 G/DL (ref 3.5–5)
ALP SERPL-CCNC: 78 U/L (ref 46–116)
ALT SERPL W P-5'-P-CCNC: 31 U/L (ref 12–78)
ANION GAP SERPL CALCULATED.3IONS-SCNC: 7 MMOL/L (ref 4–13)
APTT PPP: 31 SECONDS (ref 23–37)
AST SERPL W P-5'-P-CCNC: 21 U/L (ref 5–45)
BASOPHILS # BLD AUTO: 0.03 THOUSANDS/ΜL (ref 0–0.1)
BASOPHILS NFR BLD AUTO: 0 % (ref 0–1)
BILIRUB SERPL-MCNC: 0.37 MG/DL (ref 0.2–1)
BLD GP AB SCN SERPL QL: NEGATIVE
BUN SERPL-MCNC: 18 MG/DL (ref 5–25)
CALCIUM SERPL-MCNC: 8.6 MG/DL (ref 8.3–10.1)
CHLORIDE SERPL-SCNC: 103 MMOL/L (ref 100–108)
CO2 SERPL-SCNC: 29 MMOL/L (ref 21–32)
CREAT SERPL-MCNC: 1.19 MG/DL (ref 0.6–1.3)
EOSINOPHIL # BLD AUTO: 0.25 THOUSAND/ΜL (ref 0–0.61)
EOSINOPHIL NFR BLD AUTO: 3 % (ref 0–6)
ERYTHROCYTE [DISTWIDTH] IN BLOOD BY AUTOMATED COUNT: 15 % (ref 11.6–15.1)
GFR SERPL CREATININE-BSD FRML MDRD: 68 ML/MIN/1.73SQ M
GLUCOSE SERPL-MCNC: 119 MG/DL (ref 65–140)
HCT VFR BLD AUTO: 48.1 % (ref 36.5–49.3)
HGB BLD-MCNC: 14.9 G/DL (ref 12–17)
IMM GRANULOCYTES # BLD AUTO: 0.03 THOUSAND/UL (ref 0–0.2)
IMM GRANULOCYTES NFR BLD AUTO: 0 % (ref 0–2)
INR PPP: 1.07 (ref 0.84–1.19)
LIPASE SERPL-CCNC: 53 U/L (ref 73–393)
LYMPHOCYTES # BLD AUTO: 2.23 THOUSANDS/ΜL (ref 0.6–4.47)
LYMPHOCYTES NFR BLD AUTO: 28 % (ref 14–44)
MCH RBC QN AUTO: 28 PG (ref 26.8–34.3)
MCHC RBC AUTO-ENTMCNC: 31 G/DL (ref 31.4–37.4)
MCV RBC AUTO: 90 FL (ref 82–98)
MONOCYTES # BLD AUTO: 0.7 THOUSAND/ΜL (ref 0.17–1.22)
MONOCYTES NFR BLD AUTO: 9 % (ref 4–12)
NEUTROPHILS # BLD AUTO: 4.71 THOUSANDS/ΜL (ref 1.85–7.62)
NEUTS SEG NFR BLD AUTO: 60 % (ref 43–75)
NRBC BLD AUTO-RTO: 0 /100 WBCS
PLATELET # BLD AUTO: 306 THOUSANDS/UL (ref 149–390)
PMV BLD AUTO: 9.2 FL (ref 8.9–12.7)
POTASSIUM SERPL-SCNC: 3.7 MMOL/L (ref 3.5–5.3)
PROT SERPL-MCNC: 7.2 G/DL (ref 6.4–8.2)
PROTHROMBIN TIME: 13.3 SECONDS (ref 11.6–14.5)
RBC # BLD AUTO: 5.33 MILLION/UL (ref 3.88–5.62)
RH BLD: POSITIVE
SODIUM SERPL-SCNC: 139 MMOL/L (ref 136–145)
SPECIMEN EXPIRATION DATE: NORMAL
WBC # BLD AUTO: 7.95 THOUSAND/UL (ref 4.31–10.16)

## 2020-06-09 PROCEDURE — 83690 ASSAY OF LIPASE: CPT | Performed by: PHYSICIAN ASSISTANT

## 2020-06-09 PROCEDURE — 85025 COMPLETE CBC W/AUTO DIFF WBC: CPT | Performed by: PHYSICIAN ASSISTANT

## 2020-06-09 PROCEDURE — 99284 EMERGENCY DEPT VISIT MOD MDM: CPT

## 2020-06-09 PROCEDURE — 99284 EMERGENCY DEPT VISIT MOD MDM: CPT | Performed by: PHYSICIAN ASSISTANT

## 2020-06-09 PROCEDURE — 85610 PROTHROMBIN TIME: CPT | Performed by: PHYSICIAN ASSISTANT

## 2020-06-09 PROCEDURE — 80053 COMPREHEN METABOLIC PANEL: CPT | Performed by: PHYSICIAN ASSISTANT

## 2020-06-09 PROCEDURE — 36415 COLL VENOUS BLD VENIPUNCTURE: CPT | Performed by: PHYSICIAN ASSISTANT

## 2020-06-09 PROCEDURE — 82272 OCCULT BLD FECES 1-3 TESTS: CPT

## 2020-06-09 PROCEDURE — 86900 BLOOD TYPING SEROLOGIC ABO: CPT | Performed by: PHYSICIAN ASSISTANT

## 2020-06-09 PROCEDURE — 86850 RBC ANTIBODY SCREEN: CPT | Performed by: PHYSICIAN ASSISTANT

## 2020-06-09 PROCEDURE — 74177 CT ABD & PELVIS W/CONTRAST: CPT

## 2020-06-09 PROCEDURE — 85730 THROMBOPLASTIN TIME PARTIAL: CPT | Performed by: PHYSICIAN ASSISTANT

## 2020-06-09 PROCEDURE — 86901 BLOOD TYPING SEROLOGIC RH(D): CPT | Performed by: PHYSICIAN ASSISTANT

## 2020-06-09 RX ORDER — CIPROFLOXACIN 500 MG/1
500 TABLET, FILM COATED ORAL EVERY 12 HOURS SCHEDULED
Qty: 14 TABLET | Refills: 0 | Status: SHIPPED | OUTPATIENT
Start: 2020-06-09 | End: 2020-06-16

## 2020-06-09 RX ORDER — CIPROFLOXACIN 500 MG/1
500 TABLET, FILM COATED ORAL EVERY 12 HOURS SCHEDULED
Qty: 14 TABLET | Refills: 0 | Status: SHIPPED | OUTPATIENT
Start: 2020-06-09 | End: 2020-06-09 | Stop reason: SDUPTHER

## 2020-06-09 RX ORDER — METRONIDAZOLE 500 MG/1
500 TABLET ORAL EVERY 8 HOURS SCHEDULED
Qty: 21 TABLET | Refills: 0 | Status: SHIPPED | OUTPATIENT
Start: 2020-06-09 | End: 2020-06-09 | Stop reason: SDUPTHER

## 2020-06-09 RX ORDER — METRONIDAZOLE 500 MG/1
500 TABLET ORAL EVERY 8 HOURS SCHEDULED
Qty: 21 TABLET | Refills: 0 | Status: SHIPPED | OUTPATIENT
Start: 2020-06-09 | End: 2020-06-16

## 2020-06-09 RX ADMIN — IOHEXOL 100 ML: 350 INJECTION, SOLUTION INTRAVENOUS at 15:52

## 2021-02-22 ENCOUNTER — APPOINTMENT (EMERGENCY)
Dept: CT IMAGING | Facility: HOSPITAL | Age: 56
DRG: 440 | End: 2021-02-22
Payer: COMMERCIAL

## 2021-02-22 ENCOUNTER — HOSPITAL ENCOUNTER (INPATIENT)
Facility: HOSPITAL | Age: 56
LOS: 4 days | Discharge: HOME | DRG: 440 | End: 2021-02-26
Attending: EMERGENCY MEDICINE | Admitting: INTERNAL MEDICINE
Payer: COMMERCIAL

## 2021-02-22 DIAGNOSIS — R10.84 GENERALIZED ABDOMINAL PAIN: ICD-10-CM

## 2021-02-22 DIAGNOSIS — K85.90 PANCREATITIS: Primary | ICD-10-CM

## 2021-02-22 DIAGNOSIS — K85.00 IDIOPATHIC ACUTE PANCREATITIS WITHOUT INFECTION OR NECROSIS: ICD-10-CM

## 2021-02-22 PROBLEM — Z65.5 EXPOSURE TO DISASTER, WAR AND OTHER HOSTILITIES: Chronic | Status: ACTIVE | Noted: 2021-02-22

## 2021-02-22 LAB
ALBUMIN SERPL BCP-MCNC: 3.8 G/DL (ref 3.5–5)
ALP SERPL-CCNC: 77 U/L (ref 46–116)
ALT SERPL W P-5'-P-CCNC: 42 U/L (ref 12–78)
ANION GAP SERPL CALCULATED.3IONS-SCNC: 6 MMOL/L (ref 4–13)
AST SERPL W P-5'-P-CCNC: 22 U/L (ref 5–45)
BASOPHILS # BLD AUTO: 0.03 THOUSANDS/ΜL (ref 0–0.1)
BASOPHILS NFR BLD AUTO: 0 % (ref 0–1)
BILIRUB SERPL-MCNC: 0.53 MG/DL (ref 0.2–1)
BILIRUB UR QL STRIP: NEGATIVE
BUN SERPL-MCNC: 13 MG/DL (ref 5–25)
CALCIUM SERPL-MCNC: 9 MG/DL (ref 8.3–10.1)
CHLORIDE SERPL-SCNC: 102 MMOL/L (ref 100–108)
CLARITY UR: CLEAR
CO2 SERPL-SCNC: 29 MMOL/L (ref 21–32)
COLOR UR: YELLOW
CREAT SERPL-MCNC: 1.12 MG/DL (ref 0.6–1.3)
EOSINOPHIL # BLD AUTO: 0.19 THOUSAND/ΜL (ref 0–0.61)
EOSINOPHIL NFR BLD AUTO: 2 % (ref 0–6)
ERYTHROCYTE [DISTWIDTH] IN BLOOD BY AUTOMATED COUNT: 14.7 % (ref 11.6–15.1)
GFR SERPL CREATININE-BSD FRML MDRD: 73 ML/MIN/1.73SQ M
GLUCOSE SERPL-MCNC: 149 MG/DL (ref 65–140)
GLUCOSE UR STRIP-MCNC: NEGATIVE MG/DL
HCT VFR BLD AUTO: 48.8 % (ref 36.5–49.3)
HGB BLD-MCNC: 15.5 G/DL (ref 12–17)
HGB UR QL STRIP.AUTO: NEGATIVE
HOLD SPECIMEN: NORMAL
IMM GRANULOCYTES # BLD AUTO: 0.04 THOUSAND/UL (ref 0–0.2)
IMM GRANULOCYTES NFR BLD AUTO: 0 % (ref 0–2)
KETONES UR STRIP-MCNC: NEGATIVE MG/DL
LEUKOCYTE ESTERASE UR QL STRIP: NEGATIVE
LIPASE SERPL-CCNC: 9684 U/L (ref 73–393)
LYMPHOCYTES # BLD AUTO: 1.45 THOUSANDS/ΜL (ref 0.6–4.47)
LYMPHOCYTES NFR BLD AUTO: 15 % (ref 14–44)
MCH RBC QN AUTO: 28.2 PG (ref 26.8–34.3)
MCHC RBC AUTO-ENTMCNC: 31.8 G/DL (ref 31.4–37.4)
MCV RBC AUTO: 89 FL (ref 82–98)
MONOCYTES # BLD AUTO: 0.58 THOUSAND/ΜL (ref 0.17–1.22)
MONOCYTES NFR BLD AUTO: 6 % (ref 4–12)
NEUTROPHILS # BLD AUTO: 7.47 THOUSANDS/ΜL (ref 1.85–7.62)
NEUTS SEG NFR BLD AUTO: 77 % (ref 43–75)
NITRITE UR QL STRIP: NEGATIVE
NRBC BLD AUTO-RTO: 0 /100 WBCS
PH UR STRIP.AUTO: 8.5 [PH] (ref 4.5–8)
PLATELET # BLD AUTO: 308 THOUSANDS/UL (ref 149–390)
PMV BLD AUTO: 8.9 FL (ref 8.9–12.7)
POTASSIUM SERPL-SCNC: 4 MMOL/L (ref 3.5–5.3)
PROT SERPL-MCNC: 7.4 G/DL (ref 6.4–8.2)
PROT UR STRIP-MCNC: NEGATIVE MG/DL
RBC # BLD AUTO: 5.5 MILLION/UL (ref 3.88–5.62)
SODIUM SERPL-SCNC: 137 MMOL/L (ref 136–145)
SP GR UR STRIP.AUTO: 1.01 (ref 1–1.03)
UROBILINOGEN UR QL STRIP.AUTO: 0.2 E.U./DL
WBC # BLD AUTO: 9.76 THOUSAND/UL (ref 4.31–10.16)

## 2021-02-22 PROCEDURE — 99223 1ST HOSP IP/OBS HIGH 75: CPT | Performed by: INTERNAL MEDICINE

## 2021-02-22 PROCEDURE — 81003 URINALYSIS AUTO W/O SCOPE: CPT

## 2021-02-22 PROCEDURE — 82787 IGG 1 2 3 OR 4 EACH: CPT | Performed by: PHYSICIAN ASSISTANT

## 2021-02-22 PROCEDURE — 36415 COLL VENOUS BLD VENIPUNCTURE: CPT

## 2021-02-22 PROCEDURE — 96361 HYDRATE IV INFUSION ADD-ON: CPT

## 2021-02-22 PROCEDURE — 99285 EMERGENCY DEPT VISIT HI MDM: CPT | Performed by: PHYSICIAN ASSISTANT

## 2021-02-22 PROCEDURE — 82784 ASSAY IGA/IGD/IGG/IGM EACH: CPT | Performed by: PHYSICIAN ASSISTANT

## 2021-02-22 PROCEDURE — G1004 CDSM NDSC: HCPCS

## 2021-02-22 PROCEDURE — 96374 THER/PROPH/DIAG INJ IV PUSH: CPT

## 2021-02-22 PROCEDURE — C9113 INJ PANTOPRAZOLE SODIUM, VIA: HCPCS | Performed by: PHYSICIAN ASSISTANT

## 2021-02-22 PROCEDURE — 83690 ASSAY OF LIPASE: CPT | Performed by: EMERGENCY MEDICINE

## 2021-02-22 PROCEDURE — 99285 EMERGENCY DEPT VISIT HI MDM: CPT

## 2021-02-22 PROCEDURE — 74177 CT ABD & PELVIS W/CONTRAST: CPT

## 2021-02-22 PROCEDURE — 85025 COMPLETE CBC W/AUTO DIFF WBC: CPT | Performed by: EMERGENCY MEDICINE

## 2021-02-22 PROCEDURE — 80053 COMPREHEN METABOLIC PANEL: CPT | Performed by: EMERGENCY MEDICINE

## 2021-02-22 RX ORDER — ONDANSETRON 2 MG/ML
4 INJECTION INTRAMUSCULAR; INTRAVENOUS EVERY 6 HOURS PRN
Status: DISCONTINUED | OUTPATIENT
Start: 2021-02-22 | End: 2021-02-26 | Stop reason: HOSPADM

## 2021-02-22 RX ORDER — FLUTICASONE PROPIONATE 50 MCG
1 SPRAY, SUSPENSION (ML) NASAL DAILY
Status: DISCONTINUED | OUTPATIENT
Start: 2021-02-22 | End: 2021-02-26 | Stop reason: HOSPADM

## 2021-02-22 RX ORDER — ONDANSETRON 2 MG/ML
4 INJECTION INTRAMUSCULAR; INTRAVENOUS ONCE
Status: COMPLETED | OUTPATIENT
Start: 2021-02-22 | End: 2021-02-22

## 2021-02-22 RX ORDER — PANTOPRAZOLE SODIUM 40 MG/1
40 INJECTION, POWDER, FOR SOLUTION INTRAVENOUS EVERY 12 HOURS SCHEDULED
Status: DISCONTINUED | OUTPATIENT
Start: 2021-02-22 | End: 2021-02-26 | Stop reason: HOSPADM

## 2021-02-22 RX ORDER — LORATADINE 10 MG/1
10 TABLET ORAL DAILY
COMMUNITY

## 2021-02-22 RX ORDER — SODIUM CHLORIDE, SODIUM LACTATE, POTASSIUM CHLORIDE, CALCIUM CHLORIDE 600; 310; 30; 20 MG/100ML; MG/100ML; MG/100ML; MG/100ML
50 INJECTION, SOLUTION INTRAVENOUS CONTINUOUS
Status: DISCONTINUED | OUTPATIENT
Start: 2021-02-22 | End: 2021-02-26 | Stop reason: HOSPADM

## 2021-02-22 RX ORDER — HYDROMORPHONE HCL/PF 1 MG/ML
0.5 SYRINGE (ML) INJECTION ONCE
Status: COMPLETED | OUTPATIENT
Start: 2021-02-22 | End: 2021-02-22

## 2021-02-22 RX ORDER — ALBUTEROL SULFATE 90 UG/1
2 AEROSOL, METERED RESPIRATORY (INHALATION) EVERY 6 HOURS PRN
Status: DISCONTINUED | OUTPATIENT
Start: 2021-02-22 | End: 2021-02-26 | Stop reason: HOSPADM

## 2021-02-22 RX ORDER — FLUTICASONE FUROATE AND VILANTEROL 200; 25 UG/1; UG/1
1 POWDER RESPIRATORY (INHALATION) DAILY
Status: DISCONTINUED | OUTPATIENT
Start: 2021-02-23 | End: 2021-02-26 | Stop reason: HOSPADM

## 2021-02-22 RX ORDER — HYDROMORPHONE HCL/PF 1 MG/ML
1 SYRINGE (ML) INJECTION EVERY 4 HOURS PRN
Status: DISCONTINUED | OUTPATIENT
Start: 2021-02-22 | End: 2021-02-25

## 2021-02-22 RX ADMIN — PANTOPRAZOLE SODIUM 40 MG: 40 INJECTION, POWDER, FOR SOLUTION INTRAVENOUS at 21:00

## 2021-02-22 RX ADMIN — ONDANSETRON 4 MG: 2 INJECTION INTRAMUSCULAR; INTRAVENOUS at 23:03

## 2021-02-22 RX ADMIN — TRIMETHOBENZAMIDE HYDROCHLORIDE 200 MG: 100 INJECTION INTRAMUSCULAR at 18:29

## 2021-02-22 RX ADMIN — HYDROMORPHONE HYDROCHLORIDE 1 MG: 1 INJECTION, SOLUTION INTRAMUSCULAR; INTRAVENOUS; SUBCUTANEOUS at 20:59

## 2021-02-22 RX ADMIN — ONDANSETRON 4 MG: 2 INJECTION INTRAMUSCULAR; INTRAVENOUS at 16:50

## 2021-02-22 RX ADMIN — HYDROMORPHONE HYDROCHLORIDE 0.5 MG: 1 INJECTION, SOLUTION INTRAMUSCULAR; INTRAVENOUS; SUBCUTANEOUS at 13:06

## 2021-02-22 RX ADMIN — IOHEXOL 100 ML: 350 INJECTION, SOLUTION INTRAVENOUS at 13:35

## 2021-02-22 RX ADMIN — ONDANSETRON 4 MG: 2 INJECTION INTRAMUSCULAR; INTRAVENOUS at 14:30

## 2021-02-22 RX ADMIN — SODIUM CHLORIDE, SODIUM LACTATE, POTASSIUM CHLORIDE, AND CALCIUM CHLORIDE 150 ML/HR: .6; .31; .03; .02 INJECTION, SOLUTION INTRAVENOUS at 16:20

## 2021-02-22 RX ADMIN — SODIUM CHLORIDE 1000 ML: 0.9 INJECTION, SOLUTION INTRAVENOUS at 13:06

## 2021-02-22 RX ADMIN — HYDROMORPHONE HYDROCHLORIDE 1 MG: 1 INJECTION, SOLUTION INTRAMUSCULAR; INTRAVENOUS; SUBCUTANEOUS at 16:51

## 2021-02-22 NOTE — H&P
[address] [facility]  H&P- Donato Seeds 1965, 63 y o  male MRN: [id number]    Unit/Bed#: ED 23 Encounter: [id number]    Primary Care Provider: Sima Montiel DO   Date and time admitted to hospital: 2/22/2021 12:36 PM        * Idiopathic acute pancreatitis without infection or necrosis  Assessment & Plan  · Patient had prior episode in 2016 with unclear etiology  He continues to not be a drinker and has an un-concerning medication list  May be related to 9/11 exposure  Brief literature review has shown possible correlation with a rise in autoimmune diseases from exposure and of course various malignancies   · Admit patient to med/surg under inpatient status   · Consult Gastroenterology   ·  cc/hr   · Dilaudid PRN abdominal pain   · NPO ice chips   · Check IgG 1, 2, 3, 4     Mild reactive airways disease  Assessment & Plan  · Lungs appear clear   · Continue home regimen - substituted for formulary     Exposure to disaster, war and other hostilities  [address] [place] [address]  · Patient had multiple exposures while working during the [employer] attacks during 9/11  He had been following with [facility] for various issues, but was avoiding this due to pandemic  He is curious if pancreatitis could be due to exposure      VTE Prophylaxis: None needed as per VTE protocol   / reason for no mechanical VTE prophylaxis None needed as per VTE protocol    Code Status: Full Code   POLST: POLST form is not discussed and not completed at this time  Discussion with family: None at bedside     Anticipated Length of Stay:  Patient will be admitted on an Inpatient basis with an anticipated length of stay of  Greater than 2 midnights  Justification for Hospital Stay: As per above assessment and plan     Total Time for Visit, including Counseling / Coordination of Care: 1 hour  Greater than 50% of this total time spent on direct patient counseling and coordination of care      Chief Complaint:       History of Present Illness:    Stan Archuleta is a 63 y o  male with a history of 9/11 exposure, GERD, and reactive airway disease who presents with abdominal pain  Patient reports that pain started around 8 o'clock last night  States this persisted throughout the night with radiation to his back  He states that he was leaving for the hospitalist morning and 8 an English muffin with peanut butter and jelly and then noted worsening of the pain  Patient states that he is nauseous now, but relates this to pain medication  He denies any vomiting or diarrhea  Denies any fevers or chills  He denies any excessive alcohol use as well as denies any change to his medication was  Patient does report that he has had exposure during 9/11 and he believes that his recurrent pancreatitis without obvious cause might be related to this  It appeared he did not follow up with Gastroenterology following his last admission in 2016 for the same  He states that for his issue centered around 9/11 he follows with [facility], however given the pandemic he has been avoiding going to [facility] for further care  Review of Systems:    Review of Systems   Constitutional: Negative for appetite change, chills, diaphoresis, fatigue and fever  HENT: Negative for congestion, rhinorrhea and sore throat  Eyes: Negative for visual disturbance  Respiratory: Negative for cough, chest tightness, shortness of breath and wheezing  Cardiovascular: Negative for chest pain, palpitations and leg swelling  Gastrointestinal: Positive for abdominal pain and nausea  Negative for constipation, diarrhea and vomiting  Genitourinary: Negative for dysuria  Musculoskeletal: Negative for arthralgias and myalgias  Neurological: Negative for dizziness, syncope, weakness, light-headedness, numbness and headaches  All other systems reviewed and are negative        Past Medical and Surgical History:     Past Medical History:   Diagnosis Date    Asthma     GERD (gastroesophageal reflux disease)     Pancreatitis     Pulmonary embolism (HCC)     Spinal cord injury        Past Surgical History:   Procedure Laterality Date    SHOULDER SURGERY         Meds/Allergies:    Prior to Admission medications    Medication Sig Start Date End Date Taking? Authorizing Provider   albuterol (PROVENTIL HFA,VENTOLIN HFA) 90 mcg/act inhaler Inhale 2 puffs every 6 (six) hours as needed for wheezing   Yes Historical Provider, MD   cetirizine (ZyrTEC) 10 mg tablet Take 10 mg by mouth daily   Yes Historical Provider, MD   diazepam (VALIUM) 5 mg tablet Take 5 mg by mouth every 6 (six) hours as needed for anxiety    Yes Historical Provider, MD   fluticasone (FLONASE) 50 mcg/act nasal spray 1 spray into each nostril daily   Yes Historical Provider, MD   fluticasone-salmeterol (ADVAIR) 250-50 mcg/dose inhaler Inhale 1 puff every 12 (twelve) hours  Yes Historical Provider, MD   Ketorolac Tromethamine (TORADOL ORAL PO) Take by mouth   Yes Historical Provider, MD   loratadine (CLARITIN) 10 mg tablet Take 10 mg by mouth daily   Yes Historical Provider, MD   omeprazole (PriLOSEC) 40 MG capsule Take 40 mg by mouth daily   Yes Historical Provider, MD   ondansetron (ZOFRAN-ODT) 4 mg disintegrating tablet Take 1 tablet (4 mg total) by mouth every 8 (eight) hours as needed for nausea or vomiting 2/17/20  Yes Tom Pryor MD   Cyclobenzaprine HCl (FLEXERIL PO) Take by mouth    Historical Provider, MD   EPINEPHrine (EPIPEN) 0 3 mg/0 3 mL SOAJ Inject 0 3 mL (0 3 mg total) into the shoulder, thigh, or buttocks once for 1 dose 2/17/18 2/17/18  Nicolette Ryder MD   Oxycodone-Acetaminophen (PERCOCET PO) Take by mouth    Historical Provider, MD     I have reviewed home medications with patient personally  Allergies:    Allergies   Allergen Reactions    Other Shortness Of Breath     Perfume - pt would like to wait in family waiting room if he needs to wait    Penicillins Anaphylaxis    Xanax [Alprazolam] Anaphylaxis       Social History:     Marital Status: /Civil Union   Occupation: Disaster Response   Patient Pre-hospital Living Situation: Home with family   Patient Pre-hospital Level of Mobility: Full  Patient Pre-hospital Diet Restrictions: None  Substance Use History:   Social History     Substance and Sexual Activity   Alcohol Use Not Currently    Comment: Occasional     Social History     Tobacco Use   Smoking Status Never Smoker   Smokeless Tobacco Never Used     Social History     Substance and Sexual Activity   Drug Use No       Family History:    Family History   Problem Relation Age of Onset    Heart disease Paternal Grandmother        Physical Exam:     Vitals:   Blood Pressure: 130/74 (02/22/21 1445)  Pulse: 58 (02/22/21 1436)  Temperature: 98 2 °F (36 8 °C) (02/22/21 1230)  Temp Source: Oral (02/22/21 1230)  Respirations: 16 (02/22/21 1436)  Height: 5' 9" (175 3 cm) (02/22/21 1230)  Weight - Scale: 102 kg (224 lb) (02/22/21 1230)  SpO2: 100 % (02/22/21 1436)    Physical Exam  Constitutional:       General: He is not in acute distress  Appearance: Normal appearance  He is normal weight  He is not ill-appearing or diaphoretic  HENT:      Head: Normocephalic and atraumatic  Mouth/Throat:      Mouth: Mucous membranes are moist    Eyes:      General: No scleral icterus  Pupils: Pupils are equal, round, and reactive to light  Cardiovascular:      Rate and Rhythm: Normal rate and regular rhythm  Pulses: Normal pulses  Heart sounds: Normal heart sounds, S1 normal and S2 normal  No murmur  No systolic murmur  No diastolic murmur  No gallop  No S3 or S4 sounds  Pulmonary:      Effort: Pulmonary effort is normal  No accessory muscle usage or respiratory distress  Breath sounds: Normal breath sounds  No stridor  No decreased breath sounds, wheezing, rhonchi or rales  Chest:      Chest wall: No tenderness     Abdominal:      General: Bowel sounds are normal  There is no distension  Palpations: Abdomen is soft  Tenderness: There is no abdominal tenderness  There is no guarding  Hernia: A hernia is present  Hernia is present in the umbilical area  Musculoskeletal:      Right lower leg: No edema  Left lower leg: No edema  Skin:     General: Skin is warm and dry  Coloration: Skin is not jaundiced  Neurological:      General: No focal deficit present  Mental Status: He is alert  Mental status is at baseline  Motor: No tremor or seizure activity  Psychiatric:         Behavior: Behavior is cooperative  Additional Data:     Lab Results: I have personally reviewed pertinent reports  Results from last 7 days   Lab Units 02/22/21  1232   WBC Thousand/uL 9 76   HEMOGLOBIN g/dL 15 5   HEMATOCRIT % 48 8   PLATELETS Thousands/uL 308   NEUTROS PCT % 77*   LYMPHS PCT % 15   MONOS PCT % 6   EOS PCT % 2     Results from last 7 days   Lab Units 02/22/21  1232   SODIUM mmol/L 137   POTASSIUM mmol/L 4 0   CHLORIDE mmol/L 102   CO2 mmol/L 29   BUN mg/dL 13   CREATININE mg/dL 1 12   ANION GAP mmol/L 6   CALCIUM mg/dL 9 0   ALBUMIN g/dL 3 8   TOTAL BILIRUBIN mg/dL 0 53   ALK PHOS U/L 77   ALT U/L 42   AST U/L 22   GLUCOSE RANDOM mg/dL 149*                       Imaging: I have personally reviewed pertinent reports  CT abdomen pelvis with contrast   Final Result by [facility]  (02/22 1401)      Inflammatory stranding in the peripancreatic fat surrounding the body and tail suspicious for pancreatitis  Inflammatory changes extend along the anterior margin of Gerota's fascia on the left  Umbilical hernia contains fat  Transverse hernia defect measures 1 7 cm  The study was marked in [facility] for immediate notification              Workstation performed: [id number]             EKG, Pathology, and Other Studies Reviewed on Admission:   · CT abdomen pelvis with contrast:  Inflammatory stranding in the peripancreatic fat surrounding the body and tail suspicious for pancreatitis  Inflammatory changes extend along the anterior margin of Gerota's fascia on the left  Umbilical hernia containing fat  Transverse hernia defect measured 1 7 cm  Allscripts / Epic Records Reviewed: Yes     ** Please Note: This note has been constructed using a voice recognition system   **

## 2021-02-22 NOTE — ASSESSMENT & PLAN NOTE
· Patient had prior episode in 2016 with unclear etiology  He continues to not be a drinker and has an un-concerning medication list  May be related to 9/11 exposure   Brief literature review has shown possible correlation with a rise in autoimmune diseases from exposure and of course various malignancies   · Admit patient to med/surg under inpatient status   · Consult Gastroenterology   ·  cc/hr   · Dilaudid PRN abdominal pain   · NPO ice chips   · Check IgG 1, 2, 3, 4

## 2021-02-22 NOTE — ED PROVIDER NOTES
History  Chief Complaint   Patient presents with    Abdominal Pain     c/o upper abdominal pain that wraps around to back since 2000 yesterday     The patient is a 70-year-old male with history of GERD, esophageal erosion, pancreatitis and pulmonary embolism who presents to the emergency department for evaluation of upper abdominal pain that started last night  The patient reports that the pain has been constant since it started, however it waxes and wanes in intensity  He states that the pain radiates and wraps around into his back  He states that he is on medication already such as Prilosec  He additionally reports that he takes Toradol and/or Aleve as needed for pain  He states he took a Prilosec this morning, however he has not taken anything for pain yet today  He states this feels similar to when he previously had pancreatitis  He states that he does not drink alcohol, and they are uncertain of what caused his pancreatitis initially  Patient denies fever, chills, chest pain, shortness of breath, nausea, vomiting, diarrhea, constipation, rash, headache or dizziness  History provided by:  Patient   used: No    Abdominal Pain  Associated symptoms: no chest pain, no chills, no cough, no diarrhea, no dysuria, no fever, no hematuria, no nausea, no shortness of breath, no sore throat and no vomiting        Prior to Admission Medications   Prescriptions Last Dose Informant Patient Reported? Taking?    Cyclobenzaprine HCl (FLEXERIL PO) More than a month at Unknown time  Yes No   Sig: Take by mouth   EPINEPHrine (EPIPEN) 0 3 mg/0 3 mL SOAJ   No No   Sig: Inject 0 3 mL (0 3 mg total) into the shoulder, thigh, or buttocks once for 1 dose   Ketorolac Tromethamine (TORADOL ORAL PO) Past Month at Unknown time  Yes Yes   Sig: Take by mouth   Oxycodone-Acetaminophen (PERCOCET PO) Not Taking at Unknown time  Yes No   Sig: Take by mouth   albuterol (PROVENTIL HFA,VENTOLIN HFA) 90 mcg/act inhaler 2021 at Unknown time  Yes Yes   Sig: Inhale 2 puffs every 6 (six) hours as needed for wheezing   cetirizine (ZyrTEC) 10 mg tablet 2021 at Unknown time  Yes Yes   Sig: Take 10 mg by mouth daily   diazepam (VALIUM) 5 mg tablet Past Month at Unknown time  Yes Yes   Sig: Take 5 mg by mouth every 6 (six) hours as needed for anxiety    fluticasone (FLONASE) 50 mcg/act nasal spray 2021 at Unknown time  Yes Yes   Si spray into each nostril daily   fluticasone-salmeterol (ADVAIR) 250-50 mcg/dose inhaler 2021 at Unknown time  Yes Yes   Sig: Inhale 1 puff every 12 (twelve) hours  loratadine (CLARITIN) 10 mg tablet 2021 at Unknown time  Yes Yes   Sig: Take 10 mg by mouth daily   omeprazole (PriLOSEC) 40 MG capsule 2021 at Unknown time  Yes Yes   Sig: Take 40 mg by mouth daily   ondansetron (ZOFRAN-ODT) 4 mg disintegrating tablet 2021 at Unknown time  No Yes   Sig: Take 1 tablet (4 mg total) by mouth every 8 (eight) hours as needed for nausea or vomiting      Facility-Administered Medications: None       Past Medical History:   Diagnosis Date    Asthma     GERD (gastroesophageal reflux disease)     Pancreatitis     Pulmonary embolism ([facility] [address] )     Spinal cord injury        Past Surgical History:   Procedure Laterality Date    SHOULDER SURGERY         Family History   Problem Relation Age of Onset    Heart disease Paternal Grandmother      I have reviewed and agree with the history as documented  E-Cigarette/Vaping     E-Cigarette/Vaping Substances     Social History     Tobacco Use    Smoking status: Never Smoker    Smokeless tobacco: Never Used   Substance Use Topics    Alcohol use: Not Currently     Comment: Occasional    Drug use: No       Review of Systems   Constitutional: Negative for chills and fever  HENT: Negative for ear pain and sore throat  Eyes: Negative for redness and visual disturbance  Respiratory: Negative for cough, shortness of breath and wheezing  Cardiovascular: Negative for chest pain  Gastrointestinal: Positive for abdominal pain  Negative for diarrhea, nausea and vomiting  Genitourinary: Negative for dysuria and hematuria  Musculoskeletal: Positive for back pain  Negative for neck pain and neck stiffness  Skin: Negative for color change and rash  Neurological: Negative for dizziness, light-headedness and headaches  All other systems reviewed and are negative  Physical Exam  Physical Exam  Vitals signs and nursing note reviewed  Constitutional:       General: He is not in acute distress  Appearance: He is well-developed  He is not ill-appearing or toxic-appearing  HENT:      Head: Normocephalic and atraumatic  Eyes:      Pupils: Pupils are equal, round, and reactive to light  Neck:      Musculoskeletal: Normal range of motion and neck supple  Cardiovascular:      Rate and Rhythm: Normal rate and regular rhythm  Heart sounds: Normal heart sounds  Pulmonary:      Effort: Pulmonary effort is normal       Breath sounds: Normal breath sounds  Abdominal:      General: Bowel sounds are normal  There is no distension  Palpations: Abdomen is soft  Tenderness: There is abdominal tenderness in the right upper quadrant, epigastric area and left upper quadrant  There is no guarding or rebound  Skin:     General: Skin is warm and dry  Neurological:      Mental Status: He is alert and oriented to person, place, and time           Vital Signs  ED Triage Vitals [02/22/21 1230]   Temperature Pulse Respirations Blood Pressure SpO2   98 2 °F (36 8 °C) 76 22 168/85 97 %      Temp Source Heart Rate Source Patient Position - Orthostatic VS BP Location FiO2 (%)   Oral Monitor Sitting Left arm --      Pain Score       6           Vitals:    02/22/21 1436 02/22/21 1445 02/22/21 1719 02/22/21 2107   BP: 130/74 130/74 138/85 128/75   Pulse: 58  80 69   Patient Position - Orthostatic VS: Sitting  Sitting Sitting         Visual Acuity  Visual Acuity      Most Recent Value   L Pupil Size (mm)  2   R Pupil Size (mm)  2   L Pupil Shape  Round   R Pupil Shape  Round          ED Medications  Medications   albuterol (PROVENTIL HFA,VENTOLIN HFA) inhaler 2 puff (has no administration in time range)   fluticasone (FLONASE) 50 mcg/act nasal spray 1 spray (1 spray Nasal Not Given 2/22/21 1622)   fluticasone-vilanterol (BREO ELLIPTA) 200-25 MCG/INH inhaler 1 puff (has no administration in time range)   lactated ringers infusion (150 mL/hr Intravenous New Bag 2/22/21 1620)   ondansetron (ZOFRAN) injection 4 mg (4 mg Intravenous Given 2/22/21 1650)   HYDROmorphone (DILAUDID) injection 1 mg (1 mg Intravenous Given 2/22/21 2059)   trimethobenzamide (TIGAN) IM injection 200 mg (200 mg Intramuscular Given 2/22/21 1829)   pantoprazole (PROTONIX) injection 40 mg (40 mg Intravenous Given 2/22/21 2100)   sodium chloride 0 9 % bolus 1,000 mL (0 mL Intravenous Stopped 2/22/21 1406)   HYDROmorphone (DILAUDID) injection 0 5 mg (0 5 mg Intravenous Given 2/22/21 1306)   iohexol (OMNIPAQUE) 350 MG/ML injection (SINGLE-DOSE) 100 mL (100 mL Intravenous Given 2/22/21 1335)   ondansetron (ZOFRAN) injection 4 mg (4 mg Intravenous Given 2/22/21 1430)       Diagnostic Studies  Results Reviewed     Procedure Component Value Units Date/Time    Urine Macroscopic, POC [[id number]]  (Abnormal) Collected: 02/22/21 1838    Lab Status: Final result Specimen: Urine Updated: 02/22/21 1840     Color, UA Yellow     Clarity, UA Clear     pH, UA 8 5     Leukocytes, UA Negative     Nitrite, UA Negative     Protein, UA Negative mg/dl      Glucose, UA Negative mg/dl      Ketones, UA Negative mg/dl      Urobilinogen, UA 0 2 E U /dl      Bilirubin, UA Negative     Blood, UA Negative     Specific Gravity, UA 1 015    Narrative:      CLINITEK RESULT    IgG 1, 2, 3, and 4 [[id number]] Collected: 02/22/21 1620    Lab Status:  In process Specimen: Blood from Arm, Left Updated: 02/22/21 3723    Lipase [[id number]]  (Abnormal) Collected: 02/22/21 1232    Lab Status: Final result Specimen: Blood from Arm, Left Updated: 02/22/21 1351     Lipase 9,684 u/L     Comprehensive metabolic panel [[id number]]  (Abnormal) Collected: 02/22/21 1232    Lab Status: Final result Specimen: Blood from Arm, Left Updated: 02/22/21 1321     Sodium 137 mmol/L      Potassium 4 0 mmol/L      Chloride 102 mmol/L      CO2 29 mmol/L      ANION GAP 6 mmol/L      BUN 13 mg/dL      Creatinine 1 12 mg/dL      Glucose 149 mg/dL      Calcium 9 0 mg/dL      AST 22 U/L      ALT 42 U/L      Alkaline Phosphatase 77 U/L      Total Protein 7 4 g/dL      Albumin 3 8 g/dL      Total Bilirubin 0 53 mg/dL      eGFR 73 ml/min/1 73sq m     Narrative:      National Kidney Disease Foundation guidelines for Chronic Kidney Disease (CKD):     Stage 1 with normal or high GFR (GFR > 90 mL/min/1 73 square meters)    Stage 2 Mild CKD (GFR = 60-89 mL/min/1 73 square meters)    Stage 3A Moderate CKD (GFR = 45-59 mL/min/1 73 square meters)    Stage 3B Moderate CKD (GFR = 30-44 mL/min/1 73 square meters)    Stage 4 Severe CKD (GFR = 15-29 mL/min/1 73 square meters)    Stage 5 End Stage CKD (GFR <15 mL/min/1 73 square meters)  Note: GFR calculation is accurate only with a steady state creatinine    CBC and differential [[id number]]  (Abnormal) Collected: 02/22/21 1232    Lab Status: Final result Specimen: Blood from Arm, Left Updated: 02/22/21 1243     WBC 9 76 Thousand/uL      RBC 5 50 Million/uL      Hemoglobin 15 5 g/dL      Hematocrit 48 8 %      MCV 89 fL      MCH 28 2 pg      MCHC 31 8 g/dL      RDW 14 7 %      MPV 8 9 fL      Platelets 726 Thousands/uL      nRBC 0 /100 WBCs      Neutrophils Relative 77 %      Immat GRANS % 0 %      Lymphocytes Relative 15 %      Monocytes Relative 6 %      Eosinophils Relative 2 %      Basophils Relative 0 %      Neutrophils Absolute 7 47 Thousands/µL      Immature Grans Absolute 0 04 Thousand/uL      Lymphocytes Absolute 1 45 Thousands/µL      Monocytes Absolute 0 58 Thousand/µL      Eosinophils Absolute 0 19 Thousand/µL      Basophils Absolute 0 03 Thousands/µL                  CT abdomen pelvis with contrast   Final Result by Etelvina 6, DO (02/22 1401)      Inflammatory stranding in the peripancreatic fat surrounding the body and tail suspicious for pancreatitis  Inflammatory changes extend along the anterior margin of Gerota's fascia on the left  Umbilical hernia contains fat  Transverse hernia defect measures 1 7 cm  The study was marked in [facility] for immediate notification  Workstation performed: [id number]                    Procedures  Procedures         ED Course  ED Course as of Feb 22 2116   Mon Feb 22, 2021   1328 Patient reports significant improvement of pain at time  Pending CT scan and lipase results  1356 Lipase(!): 9,684   1411 Patient reports nausea at this time  Zofran ordered  Will work to admit for pancreatitis  SBIRT 22yo+      Most Recent Value   SBIRT (22 yo +)   In order to provide better care to our patients, we are screening all of our patients for alcohol and drug use  Would it be okay to ask you these screening questions? Yes Filed at: 02/22/2021 1720   Initial Alcohol Screen: US AUDIT-C    1  How often do you have a drink containing alcohol?  0 Filed at: 02/22/2021 1720   2  How many drinks containing alcohol do you have on a typical day you are drinking? 0 Filed at: 02/22/2021 1720   3a  Male UNDER 65: How often do you have five or more drinks on one occasion? 0 Filed at: 02/22/2021 1720   3b  FEMALE Any Age, or MALE 65+: How often do you have 4 or more drinks on one occassion? 0 Filed at: 02/22/2021 1720   Audit-C Score  0 Filed at: 02/22/2021 5677   RADHA: How many times in the past year have you    Used an illegal drug or used a prescription medication for non-medical reasons?   Never Filed at: 02/22/2021 9900 MDM  Number of Diagnoses or Management Options  Pancreatitis: new and requires workup  Diagnosis management comments: Patient presents for evaluation of upper abdominal pain that started last night  Differential includes but is not limited to cholecystitis versus pancreatitis versus GERD versus gastritis versus PUD versus bowel obstruction  Labs, imaging and 0 5 mg of Dilaudid ordered  Patient reported significant relief of pain following Dilaudid  Labs reviewed  Patient's lipase noted to be significantly at elevated  CT is also consistent with pancreatitis  Results were discussed with the patient  I advised the patient of the need for admission  He is agreeable  Patient began to develop nausea while in the ED  Zofran was ordered  Case was discussed with [facility] who agreed to accept the patient under the service of Dr Staci Corado  Patient is stable for admission             Amount and/or Complexity of Data Reviewed  Clinical lab tests: ordered and reviewed  Tests in the radiology section of CPT®: ordered and reviewed  Decide to obtain previous medical records or to obtain history from someone other than the patient: yes  Review and summarize past medical records: yes    Risk of Complications, Morbidity, and/or Mortality  Presenting problems: moderate  Diagnostic procedures: low  Management options: low    Patient Progress  Patient progress: stable      Disposition  Final diagnoses:   Pancreatitis     Time reflects when diagnosis was documented in both MDM as applicable and the Disposition within this note     Time User Action Codes Description Comment    2/22/2021  2:24 PM Jordan Kapoor Add [K85 90] Pancreatitis     2/22/2021  3:32 PM Bruce Landeros Add [K85 00] Idiopathic acute pancreatitis without infection or necrosis       ED Disposition     ED Disposition Condition Date/Time Comment    Admit Stable Mon Feb 22, 2021  2:24 PM Case was discussed with [facility] and the patient's admission status was agreed to be Admission Status: inpatient status to the service of Dr Genia Steele   Follow-up Information    None         Patient's Medications   Discharge Prescriptions    No medications on file     No discharge procedures on file      PDMP Review     None          ED Provider  Electronically Signed by           Dexter Maya PA-C  02/22/21 2363

## 2021-02-22 NOTE — ASSESSMENT & PLAN NOTE
· Patient had multiple exposures while working during the [employer] attacks during 9/11  He had been following with [facility] for various issues, but was avoiding this due to pandemic   He is curious if pancreatitis could be due to exposure

## 2021-02-23 ENCOUNTER — APPOINTMENT (INPATIENT)
Dept: ULTRASOUND IMAGING | Facility: HOSPITAL | Age: 56
DRG: 440 | End: 2021-02-23
Payer: COMMERCIAL

## 2021-02-23 LAB
ALBUMIN SERPL BCP-MCNC: 3.4 G/DL (ref 3.5–5)
ALP SERPL-CCNC: 72 U/L (ref 46–116)
ALT SERPL W P-5'-P-CCNC: 34 U/L (ref 12–78)
ANION GAP SERPL CALCULATED.3IONS-SCNC: 7 MMOL/L (ref 4–13)
AST SERPL W P-5'-P-CCNC: 17 U/L (ref 5–45)
BILIRUB SERPL-MCNC: 0.53 MG/DL (ref 0.2–1)
BUN SERPL-MCNC: 13 MG/DL (ref 5–25)
CALCIUM ALBUM COR SERPL-MCNC: 9.1 MG/DL (ref 8.3–10.1)
CALCIUM SERPL-MCNC: 8.6 MG/DL (ref 8.3–10.1)
CHLORIDE SERPL-SCNC: 102 MMOL/L (ref 100–108)
CO2 SERPL-SCNC: 26 MMOL/L (ref 21–32)
CREAT SERPL-MCNC: 0.97 MG/DL (ref 0.6–1.3)
ERYTHROCYTE [DISTWIDTH] IN BLOOD BY AUTOMATED COUNT: 14.8 % (ref 11.6–15.1)
GFR SERPL CREATININE-BSD FRML MDRD: 87 ML/MIN/1.73SQ M
GLUCOSE SERPL-MCNC: 133 MG/DL (ref 65–140)
HCT VFR BLD AUTO: 46.7 % (ref 36.5–49.3)
HGB BLD-MCNC: 15 G/DL (ref 12–17)
MCH RBC QN AUTO: 28.6 PG (ref 26.8–34.3)
MCHC RBC AUTO-ENTMCNC: 32.1 G/DL (ref 31.4–37.4)
MCV RBC AUTO: 89 FL (ref 82–98)
PLATELET # BLD AUTO: 308 THOUSANDS/UL (ref 149–390)
PMV BLD AUTO: 8.8 FL (ref 8.9–12.7)
POTASSIUM SERPL-SCNC: 4.2 MMOL/L (ref 3.5–5.3)
PROT SERPL-MCNC: 7 G/DL (ref 6.4–8.2)
RBC # BLD AUTO: 5.24 MILLION/UL (ref 3.88–5.62)
SODIUM SERPL-SCNC: 135 MMOL/L (ref 136–145)
TRIGL SERPL-MCNC: 31 MG/DL
WBC # BLD AUTO: 13.63 THOUSAND/UL (ref 4.31–10.16)

## 2021-02-23 PROCEDURE — 85027 COMPLETE CBC AUTOMATED: CPT | Performed by: PHYSICIAN ASSISTANT

## 2021-02-23 PROCEDURE — 99254 IP/OBS CNSLTJ NEW/EST MOD 60: CPT | Performed by: INTERNAL MEDICINE

## 2021-02-23 PROCEDURE — 99232 SBSQ HOSP IP/OBS MODERATE 35: CPT | Performed by: PHYSICIAN ASSISTANT

## 2021-02-23 PROCEDURE — 80053 COMPREHEN METABOLIC PANEL: CPT | Performed by: PHYSICIAN ASSISTANT

## 2021-02-23 PROCEDURE — C9113 INJ PANTOPRAZOLE SODIUM, VIA: HCPCS | Performed by: PHYSICIAN ASSISTANT

## 2021-02-23 PROCEDURE — 84478 ASSAY OF TRIGLYCERIDES: CPT | Performed by: INTERNAL MEDICINE

## 2021-02-23 RX ORDER — ACETAMINOPHEN 325 MG/1
650 TABLET ORAL EVERY 6 HOURS PRN
Status: DISCONTINUED | OUTPATIENT
Start: 2021-02-23 | End: 2021-02-26 | Stop reason: HOSPADM

## 2021-02-23 RX ORDER — LIDOCAINE 50 MG/G
1 PATCH TOPICAL DAILY
Status: DISCONTINUED | OUTPATIENT
Start: 2021-02-23 | End: 2021-02-26 | Stop reason: HOSPADM

## 2021-02-23 RX ORDER — ACETAMINOPHEN 650 MG/1
650 SUPPOSITORY RECTAL EVERY 6 HOURS PRN
Status: DISCONTINUED | OUTPATIENT
Start: 2021-02-23 | End: 2021-02-23

## 2021-02-23 RX ORDER — METHOCARBAMOL 500 MG/1
500 TABLET, FILM COATED ORAL EVERY 6 HOURS PRN
Status: DISCONTINUED | OUTPATIENT
Start: 2021-02-23 | End: 2021-02-23

## 2021-02-23 RX ORDER — PROMETHAZINE HYDROCHLORIDE 25 MG/ML
12.5 INJECTION, SOLUTION INTRAMUSCULAR; INTRAVENOUS EVERY 6 HOURS PRN
Status: DISCONTINUED | OUTPATIENT
Start: 2021-02-23 | End: 2021-02-26 | Stop reason: HOSPADM

## 2021-02-23 RX ADMIN — HYDROMORPHONE HYDROCHLORIDE 1 MG: 1 INJECTION, SOLUTION INTRAMUSCULAR; INTRAVENOUS; SUBCUTANEOUS at 08:14

## 2021-02-23 RX ADMIN — TRIMETHOBENZAMIDE HYDROCHLORIDE 200 MG: 100 INJECTION INTRAMUSCULAR at 08:48

## 2021-02-23 RX ADMIN — FLUTICASONE FUROATE AND VILANTEROL TRIFENATATE 1 PUFF: 200; 25 POWDER RESPIRATORY (INHALATION) at 08:12

## 2021-02-23 RX ADMIN — HYDROMORPHONE HYDROCHLORIDE 1 MG: 1 INJECTION, SOLUTION INTRAMUSCULAR; INTRAVENOUS; SUBCUTANEOUS at 01:28

## 2021-02-23 RX ADMIN — LIDOCAINE 5% 1 PATCH: 700 PATCH TOPICAL at 00:22

## 2021-02-23 RX ADMIN — HYDROMORPHONE HYDROCHLORIDE 1 MG: 1 INJECTION, SOLUTION INTRAMUSCULAR; INTRAVENOUS; SUBCUTANEOUS at 19:36

## 2021-02-23 RX ADMIN — PANTOPRAZOLE SODIUM 40 MG: 40 INJECTION, POWDER, FOR SOLUTION INTRAVENOUS at 21:48

## 2021-02-23 RX ADMIN — ONDANSETRON 4 MG: 2 INJECTION INTRAMUSCULAR; INTRAVENOUS at 05:08

## 2021-02-23 RX ADMIN — TRIMETHOBENZAMIDE HYDROCHLORIDE 200 MG: 100 INJECTION INTRAMUSCULAR at 02:10

## 2021-02-23 RX ADMIN — ONDANSETRON 4 MG: 2 INJECTION INTRAMUSCULAR; INTRAVENOUS at 17:19

## 2021-02-23 RX ADMIN — SODIUM CHLORIDE, SODIUM LACTATE, POTASSIUM CHLORIDE, AND CALCIUM CHLORIDE 150 ML/HR: .6; .31; .03; .02 INJECTION, SOLUTION INTRAVENOUS at 11:40

## 2021-02-23 RX ADMIN — SODIUM CHLORIDE, SODIUM LACTATE, POTASSIUM CHLORIDE, AND CALCIUM CHLORIDE 150 ML/HR: .6; .31; .03; .02 INJECTION, SOLUTION INTRAVENOUS at 17:57

## 2021-02-23 RX ADMIN — PANTOPRAZOLE SODIUM 40 MG: 40 INJECTION, POWDER, FOR SOLUTION INTRAVENOUS at 08:15

## 2021-02-23 RX ADMIN — ACETAMINOPHEN 650 MG: 325 TABLET, FILM COATED ORAL at 17:18

## 2021-02-23 RX ADMIN — SODIUM CHLORIDE, SODIUM LACTATE, POTASSIUM CHLORIDE, AND CALCIUM CHLORIDE 150 ML/HR: .6; .31; .03; .02 INJECTION, SOLUTION INTRAVENOUS at 05:11

## 2021-02-23 RX ADMIN — ONDANSETRON 4 MG: 2 INJECTION INTRAMUSCULAR; INTRAVENOUS at 11:06

## 2021-02-23 RX ADMIN — FLUTICASONE PROPIONATE 1 SPRAY: 50 SPRAY, METERED NASAL at 08:13

## 2021-02-23 RX ADMIN — PROMETHAZINE HYDROCHLORIDE 12.5 MG: 25 INJECTION INTRAMUSCULAR; INTRAVENOUS at 13:01

## 2021-02-23 RX ADMIN — ACETAMINOPHEN 650 MG: 325 TABLET, FILM COATED ORAL at 23:27

## 2021-02-23 NOTE — UTILIZATION REVIEW
Initial Clinical Review    Admission: Date/Time/Statement:   Admission Orders (From admission, onward)     Ordered        02/22/21 1425  Inpatient Admission  Once                   Orders Placed This Encounter   Procedures    Inpatient Admission     Standing Status:   Standing     Number of Occurrences:   1     Order Specific Question:   Level of Care     Answer:   Med Surg [16]     Order Specific Question:   Estimated length of stay     Answer:   More than 2 Midnights     Order Specific Question:   Certification     Answer:   I certify that inpatient services are medically necessary for this patient for a duration of greater than two midnights  See H&P and MD Progress Notes for additional information about the patient's course of treatment  ED Arrival Information     Expected Arrival Acuity Means of Arrival Escorted By Service Admission Type    - 2/22/2021 12:28 Urgent Walk-In Family Member General Medicine Urgent    Arrival Complaint    abd/back pain        Chief Complaint   Patient presents with    Abdominal Pain     c/o upper abdominal pain that wraps around to back since 2000 yesterday     Assessment/Plan: 64 yo male presented to ED from home as inpatient admission for idiopathic acute pancreatitis with infection or necrosis  History of GERD, esophageal erosion, pancreatitis and pulmonary embolism  Per patient c/o upper abdominal pain the radiated to back  Has try over the counter meds with no relief  Pain wax and wanei n intensity  On exam is abdominal tenderness in the right upper quadrant, epigastric area and left upper quadrant  There is no guarding or rebound  Plan consult GI,NPO,IVF,IV pain medication as needed with supportive care   Exposure to disaster, war and other hostilities  [address] [place] [address]  Patient had multiple exposures while working during the [employer] attacks during 9/11  He had been following with [facility] for various issues, but was avoiding this due to pandemic   He is curious if pancreatitis could be due to exposure    ED Triage Vitals [02/22/21 1230]   Temperature Pulse Respirations Blood Pressure SpO2   98 2 °F (36 8 °C) 76 22 168/85 97 %      Temp Source Heart Rate Source Patient Position - Orthostatic VS BP Location FiO2 (%)   Oral Monitor Sitting Left arm --      Pain Score       6          Wt Readings from Last 1 Encounters:   02/22/21 102 kg (224 lb)     Additional Vital Signs:   Date/Time  Temp  Pulse  Resp  BP  MAP (mmHg)  SpO2  O2 Device  Patient Position - Orthostatic VS   02/23/21 0814  --  --  --  --  --  --  None (Room air)  --   02/23/21 0800  98 8 °F (37 1 °C)  78  18  135/79  --  96 %  None (Room air)  Lying   02/23/21 0136  97 9 °F (36 6 °C)  58  18  125/71  93  95 %  None (Room air)  Lying   02/23/21 0112  --  72  18  144/78  --  98 %  None (Room air)  Lying   02/22/21 2338  --  76  20  143/85  --  98 %  None (Room air)  Sitting   02/22/21 2107  --  69  20  128/75  --  99 %  None (Room air)  Sitting   02/22/21 1719  --  80  20  138/85  --  99 %  None (Room air)  Sitting   02/22/21 1445  --  --  --  130/74  97  --  --  --   02/22/21 1436  --  58  16  130/74  --  100 %  None (Room air)  Sitting       Pertinent Labs/Diagnostic Test Results:       Results from last 7 days   Lab Units 02/23/21  0533 02/22/21  1232   WBC Thousand/uL 13 63* 9 76   HEMOGLOBIN g/dL 15 0 15 5   HEMATOCRIT % 46 7 48 8   PLATELETS Thousands/uL 308 308   NEUTROS ABS Thousands/µL  --  7 47         Results from last 7 days   Lab Units 02/23/21  0533 02/22/21  1232   SODIUM mmol/L 135* 137   POTASSIUM mmol/L 4 2 4 0   CHLORIDE mmol/L 102 102   CO2 mmol/L 26 29   ANION GAP mmol/L 7 6   BUN mg/dL 13 13   CREATININE mg/dL 0 97 1 12   EGFR ml/min/1 73sq m 87 73   CALCIUM mg/dL 8 6 9 0     Results from last 7 days   Lab Units 02/23/21  0533 02/22/21  1232   AST U/L 17 22   ALT U/L 34 42   ALK PHOS U/L 72 77   TOTAL PROTEIN g/dL 7 0 7 4   ALBUMIN g/dL 3 4* 3 8   TOTAL BILIRUBIN mg/dL 0 53 0 53 Results from last 7 days   Lab Units 02/23/21  0533 02/22/21  1232   GLUCOSE RANDOM mg/dL 133 149*     Results from last 7 days   Lab Units 02/22/21  1232   LIPASE u/L 5,833*             Results from last 7 days   Lab Units 02/22/21  1838   CLARITY UA  Clear   COLOR UA  Yellow   SPEC GRAV UA  1 015   PH UA  8 5*   GLUCOSE UA mg/dl Negative   KETONES UA mg/dl Negative   BLOOD UA  Negative   PROTEIN UA mg/dl Negative   NITRITE UA  Negative   BILIRUBIN UA  Negative   UROBILINOGEN UA E U /dl 0 2   LEUKOCYTES UA  Negative       CT A/P 02-22-21  Inflammatory stranding in the peripancreatic fat surrounding the body and tail suspicious for pancreatitis   Inflammatory changes extend along the anterior margin of Gerota's fascia on the left  Umbilical hernia contains fat   Transverse hernia defect measures 1 7 cm       ED Treatment:   Medication Administration from 02/22/2021 1226 to 02/23/2021 0122       Date/Time Order Dose Route Action     02/22/2021 1306 HYDROmorphone (DILAUDID) injection 0 5 mg 0 5 mg Intravenous Given     02/22/2021 1335 iohexol (OMNIPAQUE) 350 MG/ML injection (SINGLE-DOSE) 100 mL 100 mL Intravenous Given     02/22/2021 1430 ondansetron (ZOFRAN) injection 4 mg 4 mg Intravenous Given     02/22/2021 1622 fluticasone (FLONASE) 50 mcg/act nasal spray 1 spray 1 spray Nasal Not Given     02/22/2021 1620 lactated ringers infusion 150 mL/hr Intravenous New Bag     02/22/2021 2303 ondansetron (ZOFRAN) injection 4 mg 4 mg Intravenous Given     02/22/2021 1650 ondansetron (ZOFRAN) injection 4 mg 4 mg Intravenous Given     02/22/2021 2059 HYDROmorphone (DILAUDID) injection 1 mg 1 mg Intravenous Given     02/22/2021 1651 HYDROmorphone (DILAUDID) injection 1 mg 1 mg Intravenous Given     02/22/2021 1829 trimethobenzamide (TIGAN) IM injection 200 mg 200 mg Intramuscular Given     02/22/2021 2100 pantoprazole (PROTONIX) injection 40 mg 40 mg Intravenous Given     02/23/2021 0022 lidocaine (LIDODERM) 5 % patch 1 patch 1 patch Topical Medication Applied        Past Medical History:   Diagnosis Date    Asthma     GERD (gastroesophageal reflux disease)     Pancreatitis     Pulmonary embolism (HCC)     Spinal cord injury      Present on Admission:   Idiopathic acute pancreatitis without infection or necrosis   Mild reactive airways disease      Admitting Diagnosis: Pancreatitis [K85 90]  Abdominal pain [R10 9]  Idiopathic acute pancreatitis without infection or necrosis [K85 00]  Age/Sex: 63 y o  male  Admission Orders:  Scheduled Medications:  fluticasone, 1 spray, Nasal, Daily  fluticasone-vilanterol, 1 puff, Inhalation, Daily  lidocaine, 1 patch, Topical, Daily  pantoprazole, 40 mg, Intravenous, Q12H [facility]      Continuous IV Infusions:  lactated ringers, 150 mL/hr, Intravenous, Continuous      PRN Meds:  albuterol, 2 puff, Inhalation, Q6H PRN  HYDROmorphone, 1 mg, Intravenous, Q4H PRN  ondansetron, 4 mg, Intravenous, Q6H PRN  promethazine, 12 5 mg, Intravenous, Q6H PRN        IP CONSULT TO GASTROENTEROLOGY   NPO ice [place]uss     Network Utilization Review Department  ATTENTION: Please call with any questions or concerns to [phone] and carefully listen to the prompts so that you are directed to the right person  All voicemails are confidential   Elfego Alarcon all requests for admission clinical reviews, approved or denied determinations and any other requests to dedicated fax number below belonging to the campus where the patient is receiving treatment   List of dedicated fax numbers for the Facilities:  FACILITY NAME UR FAX NUMBER   ADMISSION DENIALS (Administrative/Medical Necessity) [phone]   [address] [place] [address] (Maternity/NICU/Pediatrics) [address] [facility],[address] Marcial Escamilla [address] ([place] "Lennie" 103) [address] [place] [address] Amy Galeano 14[address] [place] [address] 951 [phone]

## 2021-02-23 NOTE — UTILIZATION REVIEW
Notification of Inpatient Admission/Inpatient Authorization Request   This is a Notification of Inpatient Admission for [facility]  Be advised that this patient was admitted to our facility under Inpatient Status  Contact Taffy Lefort at [phone] for additional admission information  [address] UR DEPT  DEDICATED [phone]  Patient Name:   Nita Brewer   YOB: 1965       [address]   [address]:   [address]  Tax ID: [id number]  NPI: [id number] Attending Provider/NPI:  Address:  Phone: [place]nee [place], [place] [[id number]]  Same as the facility  [phone]   Place of Service Code: 24 Place of Service Name:  [address] [place] [address]   Start Date: 2/22/21 1425     Discharge Date & Time: No discharge date for patient encounter  Type of Admission: Inpatient Status Discharge Disposition   (if discharged): Home/Self Care   Patient Diagnoses: Pancreatitis [K85 90]  Abdominal pain [R10 9]  Idiopathic acute pancreatitis without infection or necrosis [K85 00]     Orders: Admission Orders (From admission, onward)     Ordered        02/22/21 1425  Inpatient Admission  Once                    Assigned Utilization Review Contact: Taffy Lefort  Utilization   Network Utilization Review Department  Phone: [phone]; Fax [phone]  Email: [email or web address]   ATTENTION PAYERS: Please call the assigned Utilization  directly with any questions or concerns ALL voicemails in the department are confidential  Send all requests for admission clinical reviews, approved or denied determinations and any other requests to dedicated fax number belonging to the campus where the patient is receiving treatment

## 2021-02-23 NOTE — ASSESSMENT & PLAN NOTE
· Patient had multiple exposures while working during the [employer] attacks during 9/11  He had been following with [facility] for various issues, but was avoiding this due to pandemic   He is curious if pancreatitis could be due to exposure
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
## 2021-02-24 ENCOUNTER — APPOINTMENT (INPATIENT)
Dept: ULTRASOUND IMAGING | Facility: HOSPITAL | Age: 56
DRG: 440 | End: 2021-02-24
Payer: COMMERCIAL

## 2021-02-24 LAB
ALBUMIN SERPL BCP-MCNC: 2.8 G/DL (ref 3.5–5)
ALP SERPL-CCNC: 59 U/L (ref 46–116)
ALT SERPL W P-5'-P-CCNC: 22 U/L (ref 12–78)
ANION GAP SERPL CALCULATED.3IONS-SCNC: 7 MMOL/L (ref 4–13)
AST SERPL W P-5'-P-CCNC: 20 U/L (ref 5–45)
BASOPHILS # BLD AUTO: 0.03 THOUSANDS/ΜL (ref 0–0.1)
BASOPHILS NFR BLD AUTO: 0 % (ref 0–1)
BILIRUB SERPL-MCNC: 0.65 MG/DL (ref 0.2–1)
BUN SERPL-MCNC: 13 MG/DL (ref 5–25)
CALCIUM ALBUM COR SERPL-MCNC: 9.2 MG/DL (ref 8.3–10.1)
CALCIUM SERPL-MCNC: 8.2 MG/DL (ref 8.3–10.1)
CHLORIDE SERPL-SCNC: 102 MMOL/L (ref 100–108)
CO2 SERPL-SCNC: 25 MMOL/L (ref 21–32)
CREAT SERPL-MCNC: 0.91 MG/DL (ref 0.6–1.3)
EOSINOPHIL # BLD AUTO: 0.06 THOUSAND/ΜL (ref 0–0.61)
EOSINOPHIL NFR BLD AUTO: 0 % (ref 0–6)
ERYTHROCYTE [DISTWIDTH] IN BLOOD BY AUTOMATED COUNT: 14.9 % (ref 11.6–15.1)
GFR SERPL CREATININE-BSD FRML MDRD: 94 ML/MIN/1.73SQ M
GLUCOSE SERPL-MCNC: 73 MG/DL (ref 65–140)
HCT VFR BLD AUTO: 43.3 % (ref 36.5–49.3)
HGB BLD-MCNC: 13.6 G/DL (ref 12–17)
IMM GRANULOCYTES # BLD AUTO: 0.06 THOUSAND/UL (ref 0–0.2)
IMM GRANULOCYTES NFR BLD AUTO: 0 % (ref 0–2)
LIPASE SERPL-CCNC: 529 U/L (ref 73–393)
LYMPHOCYTES # BLD AUTO: 1.36 THOUSANDS/ΜL (ref 0.6–4.47)
LYMPHOCYTES NFR BLD AUTO: 9 % (ref 14–44)
MCH RBC QN AUTO: 28.1 PG (ref 26.8–34.3)
MCHC RBC AUTO-ENTMCNC: 31.4 G/DL (ref 31.4–37.4)
MCV RBC AUTO: 90 FL (ref 82–98)
MONOCYTES # BLD AUTO: 1.69 THOUSAND/ΜL (ref 0.17–1.22)
MONOCYTES NFR BLD AUTO: 11 % (ref 4–12)
NEUTROPHILS # BLD AUTO: 12.54 THOUSANDS/ΜL (ref 1.85–7.62)
NEUTS SEG NFR BLD AUTO: 80 % (ref 43–75)
NRBC BLD AUTO-RTO: 0 /100 WBCS
PLATELET # BLD AUTO: 262 THOUSANDS/UL (ref 149–390)
PMV BLD AUTO: 9.7 FL (ref 8.9–12.7)
POTASSIUM SERPL-SCNC: 3.7 MMOL/L (ref 3.5–5.3)
PROT SERPL-MCNC: 6.3 G/DL (ref 6.4–8.2)
RBC # BLD AUTO: 4.84 MILLION/UL (ref 3.88–5.62)
RYE IGE QN: NEGATIVE
SODIUM SERPL-SCNC: 134 MMOL/L (ref 136–145)
WBC # BLD AUTO: 15.74 THOUSAND/UL (ref 4.31–10.16)

## 2021-02-24 PROCEDURE — 86038 ANTINUCLEAR ANTIBODIES: CPT | Performed by: PHYSICIAN ASSISTANT

## 2021-02-24 PROCEDURE — 76705 ECHO EXAM OF ABDOMEN: CPT

## 2021-02-24 PROCEDURE — 99232 SBSQ HOSP IP/OBS MODERATE 35: CPT | Performed by: INTERNAL MEDICINE

## 2021-02-24 PROCEDURE — C9113 INJ PANTOPRAZOLE SODIUM, VIA: HCPCS | Performed by: PHYSICIAN ASSISTANT

## 2021-02-24 PROCEDURE — 99232 SBSQ HOSP IP/OBS MODERATE 35: CPT | Performed by: PHYSICIAN ASSISTANT

## 2021-02-24 PROCEDURE — 85025 COMPLETE CBC W/AUTO DIFF WBC: CPT | Performed by: PHYSICIAN ASSISTANT

## 2021-02-24 PROCEDURE — 80053 COMPREHEN METABOLIC PANEL: CPT | Performed by: PHYSICIAN ASSISTANT

## 2021-02-24 PROCEDURE — 83690 ASSAY OF LIPASE: CPT | Performed by: PHYSICIAN ASSISTANT

## 2021-02-24 RX ADMIN — HYDROMORPHONE HYDROCHLORIDE 1 MG: 1 INJECTION, SOLUTION INTRAMUSCULAR; INTRAVENOUS; SUBCUTANEOUS at 18:43

## 2021-02-24 RX ADMIN — ALBUTEROL SULFATE 2 PUFF: 90 AEROSOL, METERED RESPIRATORY (INHALATION) at 17:21

## 2021-02-24 RX ADMIN — SODIUM CHLORIDE, SODIUM LACTATE, POTASSIUM CHLORIDE, AND CALCIUM CHLORIDE 150 ML/HR: .6; .31; .03; .02 INJECTION, SOLUTION INTRAVENOUS at 00:40

## 2021-02-24 RX ADMIN — LIDOCAINE 5% 1 PATCH: 700 PATCH TOPICAL at 06:01

## 2021-02-24 RX ADMIN — HYDROMORPHONE HYDROCHLORIDE 1 MG: 1 INJECTION, SOLUTION INTRAMUSCULAR; INTRAVENOUS; SUBCUTANEOUS at 13:24

## 2021-02-24 RX ADMIN — HYDROMORPHONE HYDROCHLORIDE 1 MG: 1 INJECTION, SOLUTION INTRAMUSCULAR; INTRAVENOUS; SUBCUTANEOUS at 07:42

## 2021-02-24 RX ADMIN — PANTOPRAZOLE SODIUM 40 MG: 40 INJECTION, POWDER, FOR SOLUTION INTRAVENOUS at 21:12

## 2021-02-24 RX ADMIN — FLUTICASONE FUROATE AND VILANTEROL TRIFENATATE 1 PUFF: 200; 25 POWDER RESPIRATORY (INHALATION) at 05:59

## 2021-02-24 RX ADMIN — ACETAMINOPHEN 650 MG: 325 TABLET, FILM COATED ORAL at 12:26

## 2021-02-24 RX ADMIN — SODIUM CHLORIDE, SODIUM LACTATE, POTASSIUM CHLORIDE, AND CALCIUM CHLORIDE 150 ML/HR: .6; .31; .03; .02 INJECTION, SOLUTION INTRAVENOUS at 12:42

## 2021-02-24 RX ADMIN — SODIUM CHLORIDE, SODIUM LACTATE, POTASSIUM CHLORIDE, AND CALCIUM CHLORIDE 100 ML/HR: .6; .31; .03; .02 INJECTION, SOLUTION INTRAVENOUS at 21:14

## 2021-02-24 RX ADMIN — ONDANSETRON 4 MG: 2 INJECTION INTRAMUSCULAR; INTRAVENOUS at 21:34

## 2021-02-24 RX ADMIN — HYDROMORPHONE HYDROCHLORIDE 1 MG: 1 INJECTION, SOLUTION INTRAMUSCULAR; INTRAVENOUS; SUBCUTANEOUS at 02:44

## 2021-02-24 RX ADMIN — SODIUM CHLORIDE, SODIUM LACTATE, POTASSIUM CHLORIDE, AND CALCIUM CHLORIDE 150 ML/HR: .6; .31; .03; .02 INJECTION, SOLUTION INTRAVENOUS at 05:54

## 2021-02-24 RX ADMIN — HYDROMORPHONE HYDROCHLORIDE 1 MG: 1 INJECTION, SOLUTION INTRAMUSCULAR; INTRAVENOUS; SUBCUTANEOUS at 23:17

## 2021-02-24 RX ADMIN — FLUTICASONE PROPIONATE 1 SPRAY: 50 SPRAY, METERED NASAL at 05:58

## 2021-02-24 RX ADMIN — ACETAMINOPHEN 650 MG: 325 TABLET, FILM COATED ORAL at 05:54

## 2021-02-24 RX ADMIN — PANTOPRAZOLE SODIUM 40 MG: 40 INJECTION, POWDER, FOR SOLUTION INTRAVENOUS at 07:45
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
## 2021-02-24 NOTE — PROGRESS NOTES
[facility] - Inpatient Progress Note  Miquel Gonzalez 63 y o  male MRN: [id number]  Encounter: [id number]          ASSESSMENT AND PLAN:      1  Recurrent acute pancreatitis:  Last episode of pancreatitis in 2016, current presentation of unclear etiology  Right upper quadrant ultrasound without evidence of biliary obstruction, or biliary dilatation  Denies new medications or alcohol use  IgG4 pending  White blood cell count of trending to 15 74 K today  Hemoglobin stable  Platelets normal   LFTs within normal limits, with exception of hypoalbuminemia (question dilutional)  Triglycerides normal   Lipase of 9684 on admission     - wean IV LR to 100 mL/hour  - may advance clear liquid diet to low-fat diet as tolerated; if any worsening abdominal pain, would return to clear liquid diet  - continue PPI b i d   - pain and nausea control per primary team  - repeat MRI/MRCP in 4-6 weeks to r/u underlying pancreatic lesion  - ambulation as tolerated to prevent ileus  - may trial MiraLax 17 gm p o  daily p r n  Constipation    Recommendations relayed to Heather Cramer PA-C, SLIM   ______________________________________________________________________    SUBJECTIVE:  Patient reports his nausea is slightly better today  Reports some exacerbation upper abdominal pain in the setting recent ultrasound  Denies vomiting  Passing gas        Historical Information   Past Medical History:   Diagnosis Date    Asthma     GERD (gastroesophageal reflux disease)     Pancreatitis     Pulmonary embolism (HCC)     Spinal cord injury      Past Surgical History:   Procedure Laterality Date    SHOULDER SURGERY       Social History   Social History     Substance and Sexual Activity   Alcohol Use Not Currently    Comment: Occasional     Social History     Substance and Sexual Activity   Drug Use No     Social History     Tobacco Use   Smoking Status Never Smoker   Smokeless Tobacco Never Used     Family History   Problem Relation Age of Onset    Heart disease Paternal Grandmother        Meds/Allergies       Current Facility-Administered Medications:     acetaminophen (TYLENOL) tablet 650 mg, 650 mg, Oral, Q6H PRN, 650 mg at 02/24/21 0554    albuterol (PROVENTIL HFA,VENTOLIN HFA) inhaler 2 puff, 2 puff, Inhalation, Q6H PRN    fluticasone (FLONASE) 50 mcg/act nasal spray 1 spray, 1 spray, Nasal, Daily, 1 spray at 02/24/21 0558    fluticasone-vilanterol (BREO ELLIPTA) 200-25 MCG/INH inhaler 1 puff, 1 puff, Inhalation, Daily, 1 puff at 02/24/21 0559    HYDROmorphone (DILAUDID) injection 1 mg, 1 mg, Intravenous, Q4H PRN, 1 mg at 02/24/21 0742    lactated ringers infusion, 150 mL/hr, Intravenous, Continuous, 150 mL/hr at 02/24/21 0554    lidocaine (LIDODERM) 5 % patch 1 patch, 1 patch, Topical, Daily, 1 patch at 02/24/21 0601    ondansetron (ZOFRAN) injection 4 mg, 4 mg, Intravenous, Q6H PRN, 4 mg at 02/23/21 1719    pantoprazole (PROTONIX) injection 40 mg, 40 mg, Intravenous, Q12H IVANNA, 40 mg at 02/24/21 0745    promethazine (PHENERGAN) injection 12 5 mg, 12 5 mg, Intravenous, Q6H PRN, 12 5 mg at 02/23/21 1301    Allergies   Allergen Reactions    Other Shortness Of Breath     Perfume - pt would like to wait in family waiting room if he needs to wait    Penicillins Anaphylaxis    Xanax [Alprazolam] Anaphylaxis       Objective     Blood pressure 143/77, pulse 77, temperature 98 4 °F (36 9 °C), temperature source Oral, resp  rate 18, height 5' 9" (1 753 m), weight 102 kg (224 lb), SpO2 93 %  Body mass index is 33 08 kg/m²        PHYSICAL EXAM:      General Appearance:   Alert, cooperative, no distress   HEENT:   Normocephalic, atraumatic, anicteric    Neck:  Supple, symmetrical, trachea midline   Lungs:   Clear to auscultation bilaterally; respirations even and unlabored   Heart:   Regular rate and rhythm; +S1/+S2   Abdomen:   Soft, diffuse TTP without rebound or guarding, most notably in epigastric region; non-tender, non-distended; normal bowel sounds; no masses, no organomegaly    Genitalia:   Deferred    Rectal:   Deferred    Extremities:  No cyanosis, clubbing or edema    Pulses:  2+ and symmetric    Skin:  No jaundice, rashes, or lesions visualized          Lab Results:   Admission on 02/22/2021   Component Date Value    WBC 02/22/2021 9 76     RBC 02/22/2021 5 50     Hemoglobin 02/22/2021 15 5     Hematocrit 02/22/2021 48 8     MCV 02/22/2021 89     MCH 02/22/2021 28 2     MCHC 02/22/2021 31 8     RDW 02/22/2021 14 7     MPV 02/22/2021 8 9     Platelets 65/25/6544 308     nRBC 02/22/2021 0     Neutrophils Relative 02/22/2021 77*    Immat GRANS % 02/22/2021 0     Lymphocytes Relative 02/22/2021 15     Monocytes Relative 02/22/2021 6     Eosinophils Relative 02/22/2021 2     Basophils Relative 02/22/2021 0     Neutrophils Absolute 02/22/2021 7 47     Immature Grans Absolute 02/22/2021 0 04     Lymphocytes Absolute 02/22/2021 1 45     Monocytes Absolute 02/22/2021 0 58     Eosinophils Absolute 02/22/2021 0 19     Basophils Absolute 02/22/2021 0 03     Sodium 02/22/2021 137     Potassium 02/22/2021 4 0     Chloride 02/22/2021 102     CO2 02/22/2021 29     ANION GAP 02/22/2021 6     BUN 02/22/2021 13     Creatinine 02/22/2021 1 12     Glucose 02/22/2021 149*    Calcium 02/22/2021 9 0     AST 02/22/2021 22     ALT 02/22/2021 42     Alkaline Phosphatase 02/22/2021 77     Total Protein 02/22/2021 7 4     Albumin 02/22/2021 3 8     Total Bilirubin 02/22/2021 0 53     eGFR 02/22/2021 73     Lipase 02/22/2021 9,684*    Extra Tube 02/22/2021 ON HOLD     Color, UA 02/22/2021 Yellow     Clarity, UA 02/22/2021 Clear     pH, UA 02/22/2021 8 5*    Leukocytes, UA 02/22/2021 Negative     Nitrite, UA 02/22/2021 Negative     Protein, UA 02/22/2021 Negative     Glucose, UA 02/22/2021 Negative     Ketones, UA 02/22/2021 Negative     Urobilinogen, UA 02/22/2021 0 2     Bilirubin, UA 02/22/2021 Negative     Blood, UA 02/22/2021 Negative     Specific Gravity, UA 02/22/2021 1 015     Sodium 02/23/2021 135*    Potassium 02/23/2021 4 2     Chloride 02/23/2021 102     CO2 02/23/2021 26     ANION GAP 02/23/2021 7     BUN 02/23/2021 13     Creatinine 02/23/2021 0 97     Glucose 02/23/2021 133     Calcium 02/23/2021 8 6     Corrected Calcium 02/23/2021 9 1     AST 02/23/2021 17     ALT 02/23/2021 34     Alkaline Phosphatase 02/23/2021 72     Total Protein 02/23/2021 7 0     Albumin 02/23/2021 3 4*    Total Bilirubin 02/23/2021 0 53     eGFR 02/23/2021 87     WBC 02/23/2021 13 63*    RBC 02/23/2021 5 24     Hemoglobin 02/23/2021 15 0     Hematocrit 02/23/2021 46 7     MCV 02/23/2021 89     MCH 02/23/2021 28 6     MCHC 02/23/2021 32 1     RDW 02/23/2021 14 8     Platelets 07/16/9262 308     MPV 02/23/2021 8 8*    Triglycerides 02/23/2021 31     Sodium 02/24/2021 134*    Potassium 02/24/2021 3 7     Chloride 02/24/2021 102     CO2 02/24/2021 25     ANION GAP 02/24/2021 7     BUN 02/24/2021 13     Creatinine 02/24/2021 0 91     Glucose 02/24/2021 73     Calcium 02/24/2021 8 2*    Corrected Calcium 02/24/2021 9 2     AST 02/24/2021 20     ALT 02/24/2021 22     Alkaline Phosphatase 02/24/2021 59     Total Protein 02/24/2021 6 3*    Albumin 02/24/2021 2 8*    Total Bilirubin 02/24/2021 0 65     eGFR 02/24/2021 94     WBC 02/24/2021 15 74*    RBC 02/24/2021 4 84     Hemoglobin 02/24/2021 13 6     Hematocrit 02/24/2021 43 3     MCV 02/24/2021 90     MCH 02/24/2021 28 1     MCHC 02/24/2021 31 4     RDW 02/24/2021 14 9     MPV 02/24/2021 9 7     Platelets 46/19/5560 262     nRBC 02/24/2021 0     Neutrophils Relative 02/24/2021 80*    Immat GRANS % 02/24/2021 0     Lymphocytes Relative 02/24/2021 9*    Monocytes Relative 02/24/2021 11     Eosinophils Relative 02/24/2021 0     Basophils Relative 02/24/2021 0     Neutrophils Absolute 02/24/2021 12 54*  Immature Grans Absolute 02/24/2021 0 06     Lymphocytes Absolute 02/24/2021 1 36     Monocytes Absolute 02/24/2021 1 69*    Eosinophils Absolute 02/24/2021 0 06     Basophils Absolute 02/24/2021 0 03          Radiology Results:   Us Right Upper Quadrant    Result Date: 2/24/2021  Narrative: RIGHT UPPER QUADRANT ULTRASOUND INDICATION:     assess for gallstones  COMPARISON:  None TECHNIQUE:   Real-time ultrasound of the right upper quadrant was performed with a curvilinear transducer with both volumetric sweeps and still imaging techniques  FINDINGS: PANCREAS:  Visualized portions of the pancreas are within normal limits  AORTA AND IVC:  Visualized portions are normal for patient age  LIVER: Size:  Mildly enlarged  The liver measures 18 7 cm in the midclavicular line  Contour:  Surface contour is smooth  Parenchyma: There is moderate diffuse increased echogenicity with smooth echotexture and acoustic beam attenuation  Most consistent with moderate hepatic steatosis  No evidence of suspicious mass  Limited imaging of the main portal vein shows it to be patent and hepatopetal   BILIARY: No gallbladder findings  No intrahepatic biliary dilatation  CBD measures 4 mm  No choledocholithiasis  KIDNEY: Right kidney measures 10 x 6 4 x 4 8 cm  Within normal limits  ASCITES:   None  Impression: Unremarkable gallbladder  Hepatomegaly with fatty infiltration  Workstation performed: [id number]     Ct Abdomen Pelvis With Contrast    Result Date: 2/22/2021  Narrative: CT ABDOMEN AND PELVIS WITH IV CONTRAST INDICATION:   Epigastric pain Upper Abdominal Pain  COMPARISON:  None  TECHNIQUE:  CT examination of the abdomen and pelvis was performed  Axial, sagittal, and coronal 2D reformatted images were created from the source data and submitted for interpretation  Radiation dose length product (DLP) for this visit:  1191 mGy-cm     This examination, like all CT scans performed in the [facility], was performed utilizing techniques to minimize radiation dose exposure, including the use of iterative reconstruction and automated exposure control  IV Contrast:  100 mL of iohexol (OMNIPAQUE) Enteric Contrast:  Enteric contrast was not administered  FINDINGS: ABDOMEN LOWER CHEST:  Hypoventilatory changes are seen at the lung bases  LIVER/BILIARY TREE:  Liver is diffusely decreased in density consistent with fatty change  No CT evidence of suspicious hepatic mass  Normal hepatic contours  No biliary dilatation  GALLBLADDER:  No calcified gallstones  No pericholecystic inflammatory change  SPLEEN:  Unremarkable  PANCREAS:  There is inflammatory stranding surrounding the body and tail the pancreas extending along the anterior margin of the Gerota's fascia suspicious for pancreatitis  There is no evidence of mass identified  Adjacent vasculature is patent  No evidence of biliary dilatation  ADRENAL GLANDS:  Unremarkable  KIDNEYS/URETERS:  Unremarkable  No hydronephrosis  STOMACH AND BOWEL:  Unremarkable  APPENDIX:  No findings to suggest appendicitis  ABDOMINOPELVIC CAVITY:  No ascites  No pneumoperitoneum  No lymphadenopathy  VESSELS:  Unremarkable for patient's age  PELVIS REPRODUCTIVE ORGANS:  Unremarkable for patient's age  URINARY BLADDER:  Unremarkable  ABDOMINAL WALL/INGUINAL REGIONS:  Umbilical hernia contains fat with transverse hernia defect measuring 1 7 cm  OSSEOUS STRUCTURES:  Bilateral L5 pars defects noted  Grade 1 spondylolisthesis is noted  Impression: Inflammatory stranding in the peripancreatic fat surrounding the body and tail suspicious for pancreatitis  Inflammatory changes extend along the anterior margin of Gerota's fascia on the left  Umbilical hernia contains fat  Transverse hernia defect measures 1 7 cm  The study was marked in [facility] for immediate notification   Workstation performed: [id number]       The patient was seen and examined by Dr Bernie Gonzlaez, all rawls medical decisions were made with Dr Lev Tripp  Thank you for allowing us to participate in the care of this pleasant patient  We will follow up with you closely

## 2021-02-24 NOTE — ASSESSMENT & PLAN NOTE
· Patient had multiple exposures while working during the [employer] attacks during 9/11  He had been following with [facility] for various issues, but was avoiding this due to pandemic   He is curious if pancreatitis could be due to exposure

## 2021-02-25 LAB
ALBUMIN SERPL BCP-MCNC: 2.4 G/DL (ref 3.5–5)
ALP SERPL-CCNC: 60 U/L (ref 46–116)
ALT SERPL W P-5'-P-CCNC: 29 U/L (ref 12–78)
ANION GAP SERPL CALCULATED.3IONS-SCNC: 7 MMOL/L (ref 4–13)
AST SERPL W P-5'-P-CCNC: 22 U/L (ref 5–45)
BILIRUB SERPL-MCNC: 0.83 MG/DL (ref 0.2–1)
BUN SERPL-MCNC: 11 MG/DL (ref 5–25)
CALCIUM ALBUM COR SERPL-MCNC: 9.3 MG/DL (ref 8.3–10.1)
CALCIUM SERPL-MCNC: 8 MG/DL (ref 8.3–10.1)
CHLORIDE SERPL-SCNC: 99 MMOL/L (ref 100–108)
CO2 SERPL-SCNC: 28 MMOL/L (ref 21–32)
CREAT SERPL-MCNC: 1.17 MG/DL (ref 0.6–1.3)
ERYTHROCYTE [DISTWIDTH] IN BLOOD BY AUTOMATED COUNT: 14.6 % (ref 11.6–15.1)
GFR SERPL CREATININE-BSD FRML MDRD: 69 ML/MIN/1.73SQ M
GLUCOSE SERPL-MCNC: 86 MG/DL (ref 65–140)
HCT VFR BLD AUTO: 39.6 % (ref 36.5–49.3)
HGB BLD-MCNC: 12.4 G/DL (ref 12–17)
IGG SERPL-MCNC: 872 MG/DL (ref 603–1613)
IGG1 SER-MCNC: 386 MG/DL (ref 248–810)
IGG2 SER-MCNC: 357 MG/DL (ref 130–555)
IGG3 SER-MCNC: 38 MG/DL (ref 15–102)
IGG4 SER-MCNC: 22 MG/DL (ref 2–96)
MCH RBC QN AUTO: 28.1 PG (ref 26.8–34.3)
MCHC RBC AUTO-ENTMCNC: 31.3 G/DL (ref 31.4–37.4)
MCV RBC AUTO: 90 FL (ref 82–98)
PLATELET # BLD AUTO: 245 THOUSANDS/UL (ref 149–390)
PMV BLD AUTO: 9.4 FL (ref 8.9–12.7)
POTASSIUM SERPL-SCNC: 3.5 MMOL/L (ref 3.5–5.3)
PROT SERPL-MCNC: 6 G/DL (ref 6.4–8.2)
RBC # BLD AUTO: 4.41 MILLION/UL (ref 3.88–5.62)
SODIUM SERPL-SCNC: 134 MMOL/L (ref 136–145)
WBC # BLD AUTO: 15.06 THOUSAND/UL (ref 4.31–10.16)

## 2021-02-25 PROCEDURE — 99232 SBSQ HOSP IP/OBS MODERATE 35: CPT | Performed by: INTERNAL MEDICINE

## 2021-02-25 PROCEDURE — 85027 COMPLETE CBC AUTOMATED: CPT | Performed by: PHYSICIAN ASSISTANT

## 2021-02-25 PROCEDURE — 80053 COMPREHEN METABOLIC PANEL: CPT | Performed by: PHYSICIAN ASSISTANT

## 2021-02-25 PROCEDURE — C9113 INJ PANTOPRAZOLE SODIUM, VIA: HCPCS | Performed by: PHYSICIAN ASSISTANT

## 2021-02-25 RX ORDER — HYDROMORPHONE HCL/PF 1 MG/ML
0.5 SYRINGE (ML) INJECTION EVERY 4 HOURS PRN
Status: DISCONTINUED | OUTPATIENT
Start: 2021-02-25 | End: 2021-02-26 | Stop reason: HOSPADM

## 2021-02-25 RX ORDER — CALCIUM CARBONATE 200(500)MG
500 TABLET,CHEWABLE ORAL DAILY PRN
Status: DISCONTINUED | OUTPATIENT
Start: 2021-02-25 | End: 2021-02-26 | Stop reason: HOSPADM

## 2021-02-25 RX ADMIN — FLUTICASONE FUROATE AND VILANTEROL TRIFENATATE 1 PUFF: 200; 25 POWDER RESPIRATORY (INHALATION) at 09:09

## 2021-02-25 RX ADMIN — LIDOCAINE 5% 1 PATCH: 700 PATCH TOPICAL at 06:37

## 2021-02-25 RX ADMIN — ACETAMINOPHEN 650 MG: 325 TABLET, FILM COATED ORAL at 09:14

## 2021-02-25 RX ADMIN — HYDROMORPHONE HYDROCHLORIDE 1 MG: 1 INJECTION, SOLUTION INTRAMUSCULAR; INTRAVENOUS; SUBCUTANEOUS at 03:20

## 2021-02-25 RX ADMIN — SODIUM CHLORIDE, SODIUM LACTATE, POTASSIUM CHLORIDE, AND CALCIUM CHLORIDE 50 ML/HR: .6; .31; .03; .02 INJECTION, SOLUTION INTRAVENOUS at 17:43

## 2021-02-25 RX ADMIN — PANTOPRAZOLE SODIUM 40 MG: 40 INJECTION, POWDER, FOR SOLUTION INTRAVENOUS at 20:38

## 2021-02-25 RX ADMIN — CALCIUM CARBONATE (ANTACID) CHEW TAB 500 MG 500 MG: 500 CHEW TAB at 18:22

## 2021-02-25 RX ADMIN — SODIUM CHLORIDE, SODIUM LACTATE, POTASSIUM CHLORIDE, AND CALCIUM CHLORIDE 100 ML/HR: .6; .31; .03; .02 INJECTION, SOLUTION INTRAVENOUS at 07:24

## 2021-02-25 RX ADMIN — HYDROMORPHONE HYDROCHLORIDE 1 MG: 1 INJECTION, SOLUTION INTRAMUSCULAR; INTRAVENOUS; SUBCUTANEOUS at 09:05

## 2021-02-25 RX ADMIN — FLUTICASONE PROPIONATE 1 SPRAY: 50 SPRAY, METERED NASAL at 09:09

## 2021-02-25 RX ADMIN — ACETAMINOPHEN 650 MG: 325 TABLET, FILM COATED ORAL at 02:32

## 2021-02-25 RX ADMIN — HYDROMORPHONE HYDROCHLORIDE 0.5 MG: 1 INJECTION, SOLUTION INTRAMUSCULAR; INTRAVENOUS; SUBCUTANEOUS at 17:35

## 2021-02-25 RX ADMIN — HYDROMORPHONE HYDROCHLORIDE 0.5 MG: 1 INJECTION, SOLUTION INTRAMUSCULAR; INTRAVENOUS; SUBCUTANEOUS at 21:35

## 2021-02-25 RX ADMIN — PANTOPRAZOLE SODIUM 40 MG: 40 INJECTION, POWDER, FOR SOLUTION INTRAVENOUS at 09:04

## 2021-02-25 RX ADMIN — HYDROMORPHONE HYDROCHLORIDE 1 MG: 1 INJECTION, SOLUTION INTRAMUSCULAR; INTRAVENOUS; SUBCUTANEOUS at 13:39

## 2021-02-25 NOTE — ASSESSMENT & PLAN NOTE
· Patient had multiple exposures while working during the [employer] attacks during 9/11  He had been following with [facility] for various issues, but was avoiding this due to pandemic   He is curious if pancreatitis could be due to exposure

## 2021-02-25 NOTE — ASSESSMENT & PLAN NOTE
· Patient had prior episode in 2016 with unclear etiology  He continues to not be a drinker and has an un-concerning medication list  May be related to 9/11 exposure  Brief literature review has shown possible correlation with a rise in autoimmune diseases from exposure and of course various malignancies   · Today patient reports he is feeling somewhat better   Tolerated lo-fat diet overnight w/o pain/N/V;  to palpation   · Consult Gastroenterology   · Decrease IVF to 50cc/hr  · Dilaudid PRN abdominal pain   · Continue lo fat diet today  · IgG negative  · DALE negative

## 2021-02-25 NOTE — PROGRESS NOTES
Progress Note - oDnato Adler 1965, 63 y o  male MRN: [id number]    Unit/Bed#: S -01 Encounter: [id number]    Primary Care Provider: Brit Chance DO   Date and time admitted to hospital: 2/22/2021 12:36 PM    * Idiopathic acute pancreatitis without infection or necrosis  Assessment & Plan  · Patient had prior episode in 2016 with unclear etiology  He continues to not be a drinker and has an un-concerning medication list  May be related to 9/11 exposure  Brief literature review has shown possible correlation with a rise in autoimmune diseases from exposure and of course various malignancies   · Today patient reports he is feeling somewhat better  Tolerated lo-fat diet overnight w/o pain/N/V;  to palpation   · Consult Gastroenterology   · Decrease IVF to 50cc/hr  · Dilaudid PRN abdominal pain   · Continue lo fat diet today  · IgG negative  · DALE negative    Exposure to disaster, war and other hostilities  [address] [place] [address]  · Patient had multiple exposures while working during the [employer] attacks during 9/11  He had been following with [facility] for various issues, but was avoiding this due to pandemic  He is curious if pancreatitis could be due to exposure    Mild reactive airways disease  Assessment & Plan  · Lungs appear clear   · Continue home regimen - substituted for formulary       VTE Pharmacologic Prophylaxis:   Pharmacologic: Enoxaparin (Lovenox)  Mechanical VTE Prophylaxis in Place: Yes    Patient Centered Rounds: I have performed bedside rounds with nursing staff today  Discussions with Specialists or Other Care Team Provider: ALMAS, RN, GI     Education and Discussions with Family / Patient: patient    Time Spent for Care: 30 minutes  More than 50% of total time spent on counseling and coordination of care as described above      Current Length of Stay: 3 day(s)    Current Patient Status: Inpatient   Certification Statement: The patient will continue to require additional inpatient hospital stay due to ongoing tx of pancreatitis    Discharge Plan: anticipate d/c in next 24-48 hours pending clinical improvement    Code Status: Level 1 - Full Code      Subjective:   Patient still w/ abdominal pain, but overall feeling better  Tolerating lo fat diet  No nausea/vomiting  Objective:     Vitals:   Temp (24hrs), Av °F (36 7 °C), Min:97 7 °F (36 5 °C), Max:98 2 °F (36 8 °C)    Temp:  [97 7 °F (36 5 °C)-98 2 °F (36 8 °C)] 97 7 °F (36 5 °C)  HR:  [76-81] 77  Resp:  [18] 18  BP: (138-141)/(71-81) 138/81  SpO2:  [93 %-98 %] 93 %  Body mass index is 33 08 kg/m²  Input and Output Summary (last 24 hours): Intake/Output Summary (Last 24 hours) at 2021 1123  Last data filed at 2021 0911  Gross per 24 hour   Intake 1995 ml   Output 1675 ml   Net 320 ml       Physical Exam:     Physical Exam  Constitutional:       Appearance: Normal appearance  HENT:      Head: Normocephalic and atraumatic  Mouth/Throat:      Mouth: Mucous membranes are moist    Eyes:      Pupils: Pupils are equal, round, and reactive to light  Cardiovascular:      Rate and Rhythm: Normal rate and regular rhythm  Heart sounds: No murmur  No friction rub  No gallop  Pulmonary:      Effort: Pulmonary effort is normal       Breath sounds: No wheezing  Abdominal:      General: Abdomen is flat  Bowel sounds are normal       Palpations: Abdomen is soft  Tenderness: There is abdominal tenderness (diffuse)  Musculoskeletal: Normal range of motion  Skin:     General: Skin is warm and dry  Neurological:      General: No focal deficit present  Mental Status: He is alert and oriented to person, place, and time     Psychiatric:         Mood and Affect: Mood normal          Additional Data:     Labs:    Results from last 7 days   Lab Units 21  0458 21  0538   WBC Thousand/uL 15 06* 15 74*   HEMOGLOBIN g/dL 12 4 13 6   HEMATOCRIT % 39 6 43 3   PLATELETS Thousands/uL 245 262   NEUTROS PCT %  --  80*   LYMPHS PCT %  --  9*   MONOS PCT %  --  11   EOS PCT %  --  0     Results from last 7 days   Lab Units 02/25/21  0458   SODIUM mmol/L 134*   POTASSIUM mmol/L 3 5   CHLORIDE mmol/L 99*   CO2 mmol/L 28   BUN mg/dL 11   CREATININE mg/dL 1 17   ANION GAP mmol/L 7   CALCIUM mg/dL 8 0*   ALBUMIN g/dL 2 4*   TOTAL BILIRUBIN mg/dL 0 83   ALK PHOS U/L 60   ALT U/L 29   AST U/L 22   GLUCOSE RANDOM mg/dL 86                           * I Have Reviewed All Lab Data Listed Above  * Additional Pertinent Lab Tests Reviewed: Geoffingcarlos 65 Admission Reviewed    Imaging:    Imaging Reports Reviewed Today Include: CT A/P  Imaging Personally Reviewed by Myself Includes:  CT A/P    Recent Cultures (last 7 days):           Last 24 Hours Medication List:   Current Facility-Administered Medications   Medication Dose Route Frequency Provider Last Rate    acetaminophen  650 mg Oral Q6H PRN Percy Avila PA-C      albuterol  2 puff Inhalation Q6H PRN Percy Avila PA-C      fluticasone  1 spray Nasal Daily Percy Avila PA-C      fluticasone-vilanterol  1 puff Inhalation Daily Percy Avila PA-C      HYDROmorphone  1 mg Intravenous Q4H PRN Felix Banegas PA-C      lactated ringers  50 mL/hr Intravenous Continuous Ellen Cramer PA-C 100 mL/hr (02/25/21 0724)    lidocaine  1 patch Topical Daily Noelle Pedroza PA-C      ondansetron  4 mg Intravenous Q6H PRN Percy Avila PA-C      pantoprazole  40 mg Intravenous Q12H [address] Percy Garima Avila PA-C      promethazine  12 5 mg Intravenous Q6H PRN Lyle Luis PA-C          Today, Patient Was Seen By: Maico Vazquez PA-C    ** Please Note: Dictation voice to text software may have been used in the creation of this document   **

## 2021-02-26 VITALS
OXYGEN SATURATION: 98 % | HEART RATE: 64 BPM | HEIGHT: 69 IN | BODY MASS INDEX: 33.18 KG/M2 | SYSTOLIC BLOOD PRESSURE: 134 MMHG | WEIGHT: 224 LBS | TEMPERATURE: 98.5 F | DIASTOLIC BLOOD PRESSURE: 77 MMHG | RESPIRATION RATE: 18 BRPM

## 2021-02-26 LAB
ALBUMIN SERPL BCP-MCNC: 2.3 G/DL (ref 3.5–5)
ALP SERPL-CCNC: 72 U/L (ref 46–116)
ALT SERPL W P-5'-P-CCNC: 34 U/L (ref 12–78)
ANION GAP SERPL CALCULATED.3IONS-SCNC: 6 MMOL/L (ref 4–13)
AST SERPL W P-5'-P-CCNC: 29 U/L (ref 5–45)
BILIRUB SERPL-MCNC: 0.58 MG/DL (ref 0.2–1)
BUN SERPL-MCNC: 11 MG/DL (ref 5–25)
CALCIUM ALBUM COR SERPL-MCNC: 9.8 MG/DL (ref 8.3–10.1)
CALCIUM SERPL-MCNC: 8.4 MG/DL (ref 8.3–10.1)
CHLORIDE SERPL-SCNC: 102 MMOL/L (ref 100–108)
CO2 SERPL-SCNC: 28 MMOL/L (ref 21–32)
CREAT SERPL-MCNC: 1.02 MG/DL (ref 0.6–1.3)
ERYTHROCYTE [DISTWIDTH] IN BLOOD BY AUTOMATED COUNT: 14.6 % (ref 11.6–15.1)
GFR SERPL CREATININE-BSD FRML MDRD: 82 ML/MIN/1.73SQ M
GLUCOSE SERPL-MCNC: 102 MG/DL (ref 65–140)
HCT VFR BLD AUTO: 39.9 % (ref 36.5–49.3)
HGB BLD-MCNC: 12.9 G/DL (ref 12–17)
MCH RBC QN AUTO: 28.6 PG (ref 26.8–34.3)
MCHC RBC AUTO-ENTMCNC: 32.3 G/DL (ref 31.4–37.4)
MCV RBC AUTO: 89 FL (ref 82–98)
PLATELET # BLD AUTO: 264 THOUSANDS/UL (ref 149–390)
PMV BLD AUTO: 9.2 FL (ref 8.9–12.7)
POTASSIUM SERPL-SCNC: 3.5 MMOL/L (ref 3.5–5.3)
PROT SERPL-MCNC: 6.2 G/DL (ref 6.4–8.2)
RBC # BLD AUTO: 4.51 MILLION/UL (ref 3.88–5.62)
SODIUM SERPL-SCNC: 136 MMOL/L (ref 136–145)
WBC # BLD AUTO: 11.47 THOUSAND/UL (ref 4.31–10.16)

## 2021-02-26 PROCEDURE — C9113 INJ PANTOPRAZOLE SODIUM, VIA: HCPCS | Performed by: PHYSICIAN ASSISTANT

## 2021-02-26 PROCEDURE — 85027 COMPLETE CBC AUTOMATED: CPT | Performed by: PHYSICIAN ASSISTANT

## 2021-02-26 PROCEDURE — 99239 HOSP IP/OBS DSCHRG MGMT >30: CPT | Performed by: PHYSICIAN ASSISTANT

## 2021-02-26 PROCEDURE — 80053 COMPREHEN METABOLIC PANEL: CPT | Performed by: PHYSICIAN ASSISTANT

## 2021-02-26 RX ORDER — DIAZEPAM 5 MG/1
5 TABLET ORAL EVERY 6 HOURS PRN
Qty: 8 TABLET | Refills: 0 | Status: SHIPPED | OUTPATIENT
Start: 2021-02-26 | End: 2021-10-18 | Stop reason: SDUPTHER

## 2021-02-26 RX ORDER — KETOROLAC TROMETHAMINE 10 MG/1
10 TABLET, FILM COATED ORAL EVERY 6 HOURS PRN
Qty: 8 TABLET | Refills: 0 | Status: SHIPPED | OUTPATIENT
Start: 2021-02-26 | End: 2021-10-18 | Stop reason: ALTCHOICE

## 2021-02-26 RX ADMIN — HYDROMORPHONE HYDROCHLORIDE 0.5 MG: 1 INJECTION, SOLUTION INTRAMUSCULAR; INTRAVENOUS; SUBCUTANEOUS at 09:41

## 2021-02-26 RX ADMIN — FLUTICASONE PROPIONATE 1 SPRAY: 50 SPRAY, METERED NASAL at 09:28

## 2021-02-26 RX ADMIN — LIDOCAINE 5% 1 PATCH: 700 PATCH TOPICAL at 05:44

## 2021-02-26 RX ADMIN — HYDROMORPHONE HYDROCHLORIDE 0.5 MG: 1 INJECTION, SOLUTION INTRAMUSCULAR; INTRAVENOUS; SUBCUTANEOUS at 01:36

## 2021-02-26 RX ADMIN — ACETAMINOPHEN 650 MG: 325 TABLET, FILM COATED ORAL at 01:36

## 2021-02-26 RX ADMIN — FLUTICASONE FUROATE AND VILANTEROL TRIFENATATE 1 PUFF: 200; 25 POWDER RESPIRATORY (INHALATION) at 09:28

## 2021-02-26 RX ADMIN — SODIUM CHLORIDE, SODIUM LACTATE, POTASSIUM CHLORIDE, AND CALCIUM CHLORIDE 50 ML/HR: .6; .31; .03; .02 INJECTION, SOLUTION INTRAVENOUS at 09:36

## 2021-02-26 RX ADMIN — PANTOPRAZOLE SODIUM 40 MG: 40 INJECTION, POWDER, FOR SOLUTION INTRAVENOUS at 09:28

## 2021-02-26 RX ADMIN — HYDROMORPHONE HYDROCHLORIDE 0.5 MG: 1 INJECTION, SOLUTION INTRAMUSCULAR; INTRAVENOUS; SUBCUTANEOUS at 05:45

## 2021-02-26 NOTE — DISCHARGE INSTR - AVS FIRST PAGE
Dear Aniket Blackburn,     It was our pleasure to care for you here at [facility]  It is our hope that we were always able to exceed the expected standards for your care during your stay  You were hospitalized due to pancreatitis  You were cared for on the [facility] 4th floor by Martell Tom PA-C under the service of Maria Esther Cristobal MD with the [facility] who covers for your primary care physician (PCP), Fátima Guerra DO, while you were hospitalized  If you have any questions or concerns related to this hospitalization, you may contact us at [phone]  For follow up as well as any medication refills, we recommend that you follow up with your primary care physician  A registered nurse will reach out to you by phone within a few days after your discharge to answer any additional questions that you may have after going home  However, at this time we provide for you here, the most important instructions / recommendations at discharge:     · Notable Medication Adjustments -   · Continue your home regimen as prescribed   · Testing Required after Discharge -   · You need to call your family doctor or specialists at [facility] to have a follow up MRI/MRCP in 6-8 weeks to assess for any small pancreatic lesions   · Important follow up information -   · None  · Other Instructions -   · Continue to follow a low fat diet to give your pancreas some rest    · Please review this entire after visit summary as additional general instructions including medication list, appointments, activity, diet, any pertinent wound care, and other additional recommendations from your care team that may be provided for you        Sincerely,     Martell Tom PA-C

## 2021-02-26 NOTE — PLAN OF CARE
Problem: Prexisting or High Potential for Compromised Skin Integrity  Goal: Skin integrity is maintained or improved  Description: INTERVENTIONS:  - Identify patients at risk for skin breakdown  - Assess and monitor skin integrity  - Assess and monitor nutrition and hydration status  - Monitor labs   - Assess for incontinence   - Turn and reposition patient  - Assist with mobility/ambulation  - Relieve pressure over bony prominences  - Avoid friction and shearing  - Provide appropriate hygiene as needed including keeping skin clean and dry  - Evaluate need for skin moisturizer/barrier cream  - Collaborate with interdisciplinary team   - Patient/family teaching  - Consider wound care consult   Outcome: Progressing     Problem: Potential for Falls  Goal: Patient will remain free of falls  Description: INTERVENTIONS:  - Assess patient frequently for physical needs  -  Identify cognitive and physical deficits and behaviors that affect risk of falls    -  [place] fall precautions as indicated by assessment   - Educate patient/family on patient safety including physical limitations  - Instruct patient to call for assistance with activity based on assessment  - Modify environment to reduce risk of injury  - Consider OT/PT consult to assist with strengthening/mobility  Outcome: Progressing

## 2021-02-26 NOTE — DISCHARGE SUMMARY
[facility]  Discharge- Donato Seeds 1965, 63 y o  male MRN: [id number]    Unit/Bed#: S -01 Encounter: [id number]    Primary Care Provider: Lamar Cali DO   Date and time admitted to hospital: 2/22/2021 12:36 PM        * Idiopathic acute pancreatitis without infection or necrosis  Assessment & Plan  · Patient had prior episode in 2016 with unclear etiology  He continues to not be a drinker and has an un-concerning medication list  May be related to 9/11 exposure  Brief literature review has shown possible correlation with a rise in autoimmune diseases from exposure and of course various malignancies   · Today patient reports he is feeling somewhat better  Tolerated lo-fat diet overnight w/o pain/N/V;  to palpation   · Patient is eating and drinking well  IgG negative  DALE negative  GI recs appreciated  Patient wants to go home   · Stable for discharge   · Continue low fat diet   · Patient advised to call PCP or [facility] for repeat MRCP in 6-8 weeks to assess for small pancreatic lesions that may be causing episodes  Mild reactive airways disease  Assessment & Plan  · Lungs appear clear   · Continue home regimen     Exposure to disaster, war and other hostilities  [address] [place] [address]  · Patient had multiple exposures while working during the [employer] attacks during 9/11  He had been following with [facility] for various issues, but was avoiding this due to pandemic   He is curious if pancreatitis could be due to exposure      Discharging Physician / Practitioner: Jarett Vogt PA-C  PCP: Lamar Cali DO  Admission Date:   Admission Orders (From admission, onward)     Ordered        02/22/21 1425  Inpatient Admission  Once                   Discharge Date: 02/26/21    Resolved Problems  Date Reviewed: 2/26/2021    None          Consultations During Hospital Stay:  · Gastroenterology - Dr David Lerma    Procedures Performed:   · CT Abdomen Pelvis with contrast · RUQ US     Significant Findings / Test Results:   · CT Abdomen Pelvis with contrast: Inflammatory stranding in the peripancreatic fat surrounding the body and tail suspicious for pancreatitis  Inflammatory changes extend along the anterior margin of Gerota's fascia on the left  Umbilical hernia contains fat  Transverse hernia defect measures 1 7 cm   · RUQ US: Unremarkable gallbladder  Hepatomegaly with fatty infiltration     Incidental Findings:   · As above      Test Results Pending at Discharge (will require follow up): · None      Outpatient Tests Requested:  · MRI/MRCP    Complications:  None    Reason for Admission: Pancreatitis     Hospital Course:     Ruel Corbin is a 63 y o  male patient who originally presented to the hospital on 2/22/2021 due to abdominal pain  Patient was found to have pancreatitis by CT scan, which was a recurrence  He was admitted, GI was consulted, he was kept NPO, and IV fluids and pain control were started  He had a RUQ ultrasound which was negative  Laboratory studies were unremarkable for a cause including triglycerides, IgG, and DALE  His social history was also unremarkable  With pancreatic rest, his symptoms improved and he was able to tolerate a diet so he was then discharged home  He was advised to follow up with his PCP and/or [facility] 9/11 specialists for an MRI/MRCP in 6-8 weeks when his inflammation calmed down to assess for any small pancreatic lesions that could be causing his issues  Please see above list of diagnoses and related plan for additional information  Condition at Discharge: stable     Discharge Day Visit / Exam:     Subjective:  Patient reports that he is feeling better  Reports tolerating diet, but does not like food here  Excited to go home     Vitals: Blood Pressure: 134/77 (02/26/21 0756)  Pulse: 64 (02/26/21 0756)  Temperature: 98 5 °F (36 9 °C) (02/26/21 0756)  Temp Source: Oral (02/26/21 0756)  Respirations: 18 (02/26/21 4490)  Height: 5' 9" (175 3 cm) (02/22/21 1230)  Weight - Scale: 102 kg (224 lb) (02/22/21 1230)  SpO2: 98 % (02/26/21 0756)  Exam:   Physical Exam  Constitutional:       General: He is not in acute distress  Appearance: Normal appearance  He is normal weight  He is not ill-appearing or diaphoretic  HENT:      Head: Normocephalic and atraumatic  Mouth/Throat:      Mouth: Mucous membranes are moist    Eyes:      General: No scleral icterus  Pupils: Pupils are equal, round, and reactive to light  Cardiovascular:      Rate and Rhythm: Normal rate and regular rhythm  Pulses: Normal pulses  Heart sounds: Normal heart sounds, S1 normal and S2 normal  No murmur  No systolic murmur  No diastolic murmur  No gallop  No S3 or S4 sounds  Pulmonary:      Effort: Pulmonary effort is normal  No accessory muscle usage or respiratory distress  Breath sounds: Normal breath sounds  No stridor  No decreased breath sounds, wheezing, rhonchi or rales  Chest:      Chest wall: No tenderness  Abdominal:      General: Bowel sounds are normal  There is no distension  Palpations: Abdomen is soft  Tenderness: There is no abdominal tenderness  There is no guarding  Musculoskeletal:      Right lower leg: No edema  Left lower leg: No edema  Skin:     General: Skin is warm and dry  Coloration: Skin is not jaundiced  Neurological:      General: No focal deficit present  Mental Status: He is alert  Mental status is at baseline  Motor: No tremor or seizure activity  Psychiatric:         Behavior: Behavior is cooperative  Discussion with Family: None requested     Discharge instructions/Information to patient and family:   See after visit summary for information provided to patient and family  Provisions for Follow-Up Care:  See after visit summary for information related to follow-up care and any pertinent home health orders        Disposition:     Home    For Discharges to [facility]:   · Not Applicable to this Patient - Not Applicable to this Patient    Planned Readmission: None     Discharge Statement:  I spent 45 minutes discharging the patient  This time was spent on the day of discharge  I had direct contact with the patient on the day of discharge  Greater than 50% of the total time was spent examining patient, answering all patient questions, arranging and discussing plan of care with patient as well as directly providing post-discharge instructions  Additional time then spent on discharge activities  Discharge Medications:  See after visit summary for reconciled discharge medications provided to patient and family        ** Please Note: This note has been constructed using a voice recognition system **

## 2021-02-26 NOTE — ASSESSMENT & PLAN NOTE
· Patient had multiple exposures while working during the [employer] attacks during 9/11  He had been following with [facility] for various issues, but was avoiding this due to pandemic   He is curious if pancreatitis could be due to exposure

## 2021-02-26 NOTE — ASSESSMENT & PLAN NOTE
· Patient had prior episode in 2016 with unclear etiology  He continues to not be a drinker and has an un-concerning medication list  May be related to 9/11 exposure  Brief literature review has shown possible correlation with a rise in autoimmune diseases from exposure and of course various malignancies   · Today patient reports he is feeling somewhat better  Tolerated lo-fat diet overnight w/o pain/N/V;  to palpation   · Patient is eating and drinking well  IgG negative  DALE negative  GI recs appreciated  Patient wants to go home   · Stable for discharge   · Continue low fat diet   · Patient advised to call PCP or [facility] for repeat MRCP in 6-8 weeks to assess for small pancreatic lesions that may be causing episodes

## 2021-03-02 NOTE — UTILIZATION REVIEW
Notification of Discharge  This is a Notification of Discharge from our facility [address]  Please be advised that this patient has been discharge from our facility  Below you will find the admission and discharge date and time including the patients disposition  PRESENTATION DATE: 2/22/2021 12:36 PM  OBS ADMISSION DATE:   IP ADMISSION DATE: 2/22/21 1425   DISCHARGE DATE: 2/26/2021 12:40 PM  DISPOSITION: Home/Self Care Home/Self Care   Admission Orders listed below:  Admission Orders (From admission, onward)     Ordered        02/22/21 1425  Inpatient Admission  Once                   Please contact the UR Department if additional information is required to close this patient's authorization/case  [address] in[employer] Utilization Review Department  Main: [phone] x carefully listen to the prompts  All voicemails are confidential   [email or web address]  Send all requests for admission clinical reviews, approved or denied determinations and any other requests to dedicated fax number below belonging to the campus where the patient is receiving treatment   List of dedicated fax numbers:  [address] DENIALS (Administrative/Medical Necessity) [phone]   [address] (Maternity/NICU/Pediatrics) [phone]   Yvette Steward [phone]   Karol Palacio [phone]   [facility] [phone]   [facility] [address] [facility] [phone]   [facility]  [phone]   [address] [facility]  [address] [facility] [address] [phone]

## 2021-09-17 ENCOUNTER — HOSPITAL ENCOUNTER (OUTPATIENT)
Dept: RADIOLOGY | Facility: HOSPITAL | Age: 56
Discharge: HOME | End: 2021-09-17
Payer: COMMERCIAL

## 2021-09-17 DIAGNOSIS — K85.00 IDIOPATHIC ACUTE PANCREATITIS WITHOUT NECROSIS OR INFECTION: ICD-10-CM

## 2021-09-17 PROCEDURE — 74181 MRI ABDOMEN W/O CONTRAST: CPT

## 2021-10-13 DIAGNOSIS — R10.84 GENERALIZED ABDOMINAL PAIN: ICD-10-CM

## 2021-10-13 RX ORDER — KETOROLAC TROMETHAMINE 10 MG/1
10 TABLET, FILM COATED ORAL EVERY 6 HOURS PRN
Qty: 8 TABLET | Refills: 0 | Status: CANCELLED | OUTPATIENT
Start: 2021-10-13

## 2021-10-13 RX ORDER — DIAZEPAM 5 MG/1
5 TABLET ORAL EVERY 6 HOURS PRN
Qty: 8 TABLET | Refills: 0 | Status: CANCELLED | OUTPATIENT
Start: 2021-10-13 | End: 2021-10-23

## 2021-10-14 RX ORDER — SILDENAFIL 100 MG/1
100 TABLET, FILM COATED ORAL DAILY PRN
COMMUNITY
End: 2021-10-18 | Stop reason: SDUPTHER

## 2021-10-14 RX ORDER — ALBUTEROL SULFATE 2.5 MG/3ML
2.5 SOLUTION RESPIRATORY (INHALATION) EVERY 6 HOURS PRN
COMMUNITY

## 2021-10-14 RX ORDER — FAMOTIDINE 40 MG/1
40 TABLET, FILM COATED ORAL DAILY
COMMUNITY
Start: 2021-09-18

## 2021-10-14 RX ORDER — BUDESONIDE 0.25 MG/2ML
INHALANT ORAL
COMMUNITY
Start: 2020-12-28

## 2021-10-18 ENCOUNTER — OFFICE VISIT (OUTPATIENT)
Dept: INTERNAL MEDICINE CLINIC | Facility: CLINIC | Age: 56
End: 2021-10-18
Payer: COMMERCIAL

## 2021-10-18 VITALS
HEIGHT: 69 IN | BODY MASS INDEX: 33.83 KG/M2 | SYSTOLIC BLOOD PRESSURE: 130 MMHG | TEMPERATURE: 98.3 F | WEIGHT: 228.4 LBS | HEART RATE: 65 BPM | DIASTOLIC BLOOD PRESSURE: 84 MMHG | OXYGEN SATURATION: 98 %

## 2021-10-18 DIAGNOSIS — N52.1 ERECTILE DYSFUNCTION DUE TO DISEASES CLASSIFIED ELSEWHERE: Primary | ICD-10-CM

## 2021-10-18 DIAGNOSIS — G89.29 CHRONIC MIDLINE LOW BACK PAIN WITHOUT SCIATICA: ICD-10-CM

## 2021-10-18 DIAGNOSIS — Z65.5 EXPOSURE TO DISASTER, WAR AND OTHER HOSTILITIES: Chronic | ICD-10-CM

## 2021-10-18 DIAGNOSIS — K85.00 IDIOPATHIC ACUTE PANCREATITIS WITHOUT INFECTION OR NECROSIS: ICD-10-CM

## 2021-10-18 DIAGNOSIS — R10.84 GENERALIZED ABDOMINAL PAIN: ICD-10-CM

## 2021-10-18 DIAGNOSIS — M54.50 CHRONIC MIDLINE LOW BACK PAIN WITHOUT SCIATICA: ICD-10-CM

## 2021-10-18 PROCEDURE — 1036F TOBACCO NON-USER: CPT | Performed by: NURSE PRACTITIONER

## 2021-10-18 PROCEDURE — 99214 OFFICE O/P EST MOD 30 MIN: CPT | Performed by: NURSE PRACTITIONER

## 2021-10-18 PROCEDURE — 3725F SCREEN DEPRESSION PERFORMED: CPT | Performed by: NURSE PRACTITIONER

## 2021-10-18 PROCEDURE — 3008F BODY MASS INDEX DOCD: CPT | Performed by: NURSE PRACTITIONER

## 2021-10-18 RX ORDER — SILDENAFIL 100 MG/1
100 TABLET, FILM COATED ORAL DAILY PRN
Qty: 10 TABLET | Refills: 1 | Status: SHIPPED | OUTPATIENT
Start: 2021-10-18

## 2021-10-18 RX ORDER — MONTELUKAST SODIUM 10 MG/1
10 TABLET ORAL
COMMUNITY

## 2021-10-18 RX ORDER — DIAZEPAM 5 MG/1
5 TABLET ORAL EVERY 12 HOURS PRN
Qty: 10 TABLET | Refills: 0 | Status: SHIPPED | OUTPATIENT
Start: 2021-10-18 | End: 2022-01-14

## 2021-10-19 ENCOUNTER — VBI (OUTPATIENT)
Dept: ADMINISTRATIVE | Facility: OTHER | Age: 56
End: 2021-10-19

## 2022-01-14 ENCOUNTER — TELEMEDICINE (OUTPATIENT)
Dept: INTERNAL MEDICINE CLINIC | Facility: CLINIC | Age: 57
End: 2022-01-14
Payer: COMMERCIAL

## 2022-01-14 ENCOUNTER — HOSPITAL ENCOUNTER (OUTPATIENT)
Dept: CT IMAGING | Facility: HOSPITAL | Age: 57
Discharge: HOME | End: 2022-01-14
Payer: COMMERCIAL

## 2022-01-14 VITALS — TEMPERATURE: 99.8 F

## 2022-01-14 DIAGNOSIS — B34.9 VIRAL INFECTION, UNSPECIFIED: Primary | ICD-10-CM

## 2022-01-14 DIAGNOSIS — R10.84 GENERALIZED ABDOMINAL PAIN: Primary | ICD-10-CM

## 2022-01-14 DIAGNOSIS — B34.9 VIRAL INFECTION, UNSPECIFIED: ICD-10-CM

## 2022-01-14 DIAGNOSIS — R10.84 GENERALIZED ABDOMINAL PAIN: ICD-10-CM

## 2022-01-14 PROCEDURE — 1036F TOBACCO NON-USER: CPT | Performed by: NURSE PRACTITIONER

## 2022-01-14 PROCEDURE — 74177 CT ABD & PELVIS W/CONTRAST: CPT

## 2022-01-14 PROCEDURE — 99214 OFFICE O/P EST MOD 30 MIN: CPT | Performed by: NURSE PRACTITIONER

## 2022-01-14 PROCEDURE — G1004 CDSM NDSC: HCPCS

## 2022-01-14 RX ORDER — SODIUM CHLORIDE/SODIUM BICARB
PACKET (EA) NASAL
COMMUNITY
Start: 2021-11-11

## 2022-01-14 RX ORDER — FLUTICASONE FUROATE, UMECLIDINIUM BROMIDE AND VILANTEROL TRIFENATATE 100; 62.5; 25 UG/1; UG/1; UG/1
1 POWDER RESPIRATORY (INHALATION) DAILY
COMMUNITY
Start: 2021-12-17

## 2022-01-14 RX ADMIN — IOHEXOL 100 ML: 350 INJECTION, SOLUTION INTRAVENOUS at 18:29

## 2022-01-14 NOTE — ASSESSMENT & PLAN NOTE
Will test for COVID (patient would like to get tested first) as abdominal pain is diffuse  However in the presence of a fever will order CT of abdomen and pelvis, patient is to get CT scan done if symptoms worsen or do not continue to improve  Advised patient to continue with bland diet and to stay well hydrated  Call back if symptoms not improving  Concern for diverticulitis

## 2022-01-14 NOTE — PROGRESS NOTES
COVID-19 Outpatient Progress Note    Assessment/Plan:    Problem List Items Addressed This Visit        Other    Abdominal pain - Primary     Will test for COVID (patient would like to get tested first) as abdominal pain is diffuse  However in the presence of a fever will order CT of abdomen and pelvis, patient is to get CT scan done if symptoms worsen or do not continue to improve  Advised patient to continue with bland diet and to stay well hydrated  Call back if symptoms not improving  Concern for diverticulitis  Relevant Orders    CT abdomen pelvis w contrast      Other Visit Diagnoses     Viral infection, unspecified        Relevant Orders    COVID Only - Office Collect         Disposition:     Referred patient to centralized site to test for COVID-19  Illness appears to be viral in nature  Rest and fluids advised  Educated that the course of this illness could be 2-4 weeks  Discussed symptomatic relief, such as warm steam inhalations, tylenol/ibuprofen for fevers and body aches, rest, and drink plenty of fluids  Warm salt gargles for sore throat  Discussed red flag signs to go to the ER, such as chest pain or shortness of breath  Return to the office for reevaluation if symptoms worsen or do not improve in 1-2 weeks     See HPI for abdominal pain  I have spent 25 minutes directly with the patient  Encounter provider Wilkins Hodgkin, CRNP    Provider located at [address] [address]    Recent Visits  No visits were found meeting these conditions  Showing recent visits within past 7 days and meeting all other requirements  Today's Visits  Date Type Provider Dept   01/14/22 Justina Anand [address]   ETHAN Raymundo [facility]   Showing today's visits and meeting all other requirements  Future Appointments  No visits were found meeting these conditions  Showing future appointments within next 150 days and meeting all other requirements     This virtual check-in was done via [employer] and patient was informed that this is not a secure, HIPAA-compliant platform  He agrees to proceed  Patient agrees to participate in a virtual check in via telephone or video visit instead of presenting to the office to address urgent/immediate medical needs  Patient is aware this is a billable service  After connecting through [facility], the patient was identified by name and date of birth  Nicolas Burnham was informed that this was a telemedicine visit and that the exam was being conducted confidentially over secure lines  No one else was in the room  Nicolas Burnham acknowledged consent and understanding of privacy and security of the telemedicine visit  I informed the patient that I have reviewed his record in Epic and presented the opportunity for him to ask any questions regarding the visit today  The patient agreed to participate  Verification of patient location:  Patient is located in the following state in which I hold an active license: [place]    Subjective:   Nicolas Burnham is a 61 y o  male who is concerned about COVID-19  Patient's symptoms include fever (101 4 x2days), chills, fatigue, abdominal pain (pain is episgastric, and LLQ) and diarrhea (multiple watery stools)  Patient denies malaise, congestion, rhinorrhea, sore throat, anosmia, loss of taste, cough, shortness of breath, chest tightness, nausea, vomiting, myalgias and headaches           COVID-19 vaccination status: Fully vaccinated (primary series)    Exposure:   Contact with a person who is under investigation (PUI) for or who is positive for COVID-19 within the last 14 days?: No    Hospitalized recently for fever and/or lower respiratory symptoms?: No      Currently a healthcare worker that is involved in direct patient care?: No      Works in a special setting where the risk of COVID-19 transmission may be high? (this may include long-term care, correctional and MCC facilities; homeless shelters; assisted-living facilities and group homes ): No      Resident in a special setting where the risk of COVID-19 transmission may be high? (this may include long-term care, correctional and MCC facilities; homeless shelters; assisted-living facilities and group homes ): No      Rapid COVID test at home was negative    Tylenol for fever    Did not have COVID booster  Did not have flu vaccination    Denies sick contacts    No results found for: Ezzard Chaparro, SARSCORONAVI, CORONAVIRUSR, [address]  Past Medical History:   Diagnosis Date    Allergic     Asthma     Cancer ([facility] [address] )     skin cancer    Chronic back pain     GERD (gastroesophageal reflux disease)     Pancreatitis     Pulmonary embolism ([facility] )     Spinal cord injury      Past Surgical History:   Procedure Laterality Date    COLONOSCOPY  2019    [facility]   Monet Courser SHOULDER SURGERY       Current Outpatient Medications   Medication Sig Dispense Refill    albuterol (2 5 mg/3 mL) 0 083 % nebulizer solution Take 2 5 mg by nebulization every 6 (six) hours as needed for wheezing or shortness of breath      albuterol (PROVENTIL HFA,VENTOLIN HFA) 90 mcg/act inhaler Inhale 2 puffs every 6 (six) hours as needed for wheezing      budesonide (PULMICORT) 0 25 mg/2 mL nebulizer solution "NOT FOR NEBULIZATION, FOR IRRIGATION ONLY-Mix 1 ampule in 8oz of NaCI, irrigate each nostril with 4oz, twice a day"      cetirizine (ZyrTEC) 10 mg tablet Take 10 mg by mouth daily      diazepam (VALIUM) 5 mg tablet Take 1 tablet (5 mg total) by mouth every 12 (twelve) hours as needed for muscle spasms for up to 10 days 10 tablet 0    EPINEPHrine (EPIPEN) 0 3 mg/0 3 mL SOAJ Inject 0 3 mL (0 3 mg total) into the shoulder, thigh, or buttocks once for 1 dose 0 3 mL 0    famotidine (PEPCID) 40 MG tablet Take 40 mg by mouth daily       fluticasone (FLONASE) 50 mcg/act nasal spray 1 spray into each nostril daily      Hypertonic Nasal Wash (Sinus Rinse Refill) PACK       loratadine (CLARITIN) 10 mg tablet Take 10 mg by mouth daily      montelukast (SINGULAIR) 10 mg tablet Take 10 mg by mouth daily at bedtime      omeprazole (PriLOSEC) 40 MG capsule Take 40 mg by mouth daily      ondansetron (ZOFRAN-ODT) 4 mg disintegrating tablet Take 1 tablet (4 mg total) by mouth every 8 (eight) hours as needed for nausea or vomiting 20 tablet 0    sildenafil (VIAGRA) 100 mg tablet Take 1 tablet (100 mg total) by mouth daily as needed for erectile dysfunction 10 tablet 1    Trelegy Ellipta 100-62 5-25 MCG/INH inhaler Inhale 1 puff daily        fluticasone-salmeterol (ADVAIR) 250-50 mcg/dose inhaler Inhale 1 puff every 12 (twelve) hours  (Patient not taking: Reported on 1/14/2022 )       No current facility-administered medications for this visit  Allergies   Allergen Reactions    Other Shortness Of Breath and Throat Swelling     Perfume - pt would like to wait in family waiting room if he needs to wait  Applies to any chemical sent    Penicillins Anaphylaxis    Xanax [Alprazolam] Anaphylaxis       Review of Systems   Constitutional: Positive for appetite change (decreased), chills, fatigue and fever (101 4 x2days)  Negative for activity change and diaphoresis  HENT: Negative for congestion, ear discharge, ear pain, postnasal drip, rhinorrhea, sinus pressure, sinus pain and sore throat  Eyes: Negative for pain, discharge, itching and visual disturbance  Respiratory: Negative for cough, chest tightness, shortness of breath and wheezing  Cardiovascular: Negative for chest pain, palpitations and leg swelling  Gastrointestinal: Positive for abdominal pain (pain is episgastric, and LLQ) and diarrhea (multiple watery stools)  Negative for constipation, nausea and vomiting  Endocrine: Negative for polydipsia, polyphagia and polyuria     Genitourinary: Negative for difficulty urinating, dysuria and urgency  Musculoskeletal: Negative for arthralgias, back pain, myalgias and neck pain  Skin: Negative for rash and wound  Neurological: Negative for dizziness, weakness, numbness and headaches  Objective:    Vitals:    01/14/22 1426   Temp: 99 8 °F (37 7 °C)   TempSrc: Temporal       Physical Exam  Vitals reviewed  Constitutional:       Appearance: Normal appearance  Pulmonary:      Effort: No respiratory distress  Neurological:      General: No focal deficit present  Mental Status: He is alert and oriented to person, place, and time  Psychiatric:         Mood and Affect: Mood normal          Behavior: Behavior normal          VIRTUAL VISIT DISCLAIMER    Donato Adler verbally agrees to participate in [employer]  Pt is aware that [employer] could be limited without vital signs or the ability to perform a full hands-on physical exam  Donato Adler understands he or the provider may request at any time to terminate the video visit and request the patient to seek care or treatment in person

## 2022-01-15 NOTE — PROGRESS NOTES
Patient was to come to office for COVID testing before going for CT scan  Patient went for CT scan and did not have COVID testing done  CT disclosed colitis  Patient advised to have COVID test done tomorrow and advised use of Imodium AD PRN for diarrhea to maximum of 10 tabs in 24 hours

## 2022-01-17 ENCOUNTER — TELEPHONE (OUTPATIENT)
Dept: INTERNAL MEDICINE CLINIC | Facility: CLINIC | Age: 57
End: 2022-01-17

## 2022-06-10 ENCOUNTER — VBI (OUTPATIENT)
Dept: ADMINISTRATIVE | Facility: OTHER | Age: 57
End: 2022-06-10

## 2022-08-23 ENCOUNTER — TELEPHONE (OUTPATIENT)
Dept: INTERNAL MEDICINE CLINIC | Age: 57
End: 2022-08-23

## 2022-08-23 NOTE — TELEPHONE ENCOUNTER
Patient called and wanted to speak with Bhaskar Santa, but was transferred to [facility]  He was stating that he had blood work done for [facility] and it was non fasting blood work that he stated  They told him that his blood work was little abnormal   U/A showed Ketones in it and his cholesterol and sugar levels were elevated  He was told him to get his levels rechecked again since he wasn't fasting for this test       When I told him that Bhaskar Pratt is not working with our practice anymore that he was upset  He wanted just for us to order additional blood work for him to get done with him fasting  I told him that we would need to see you first before we do any ordering for more blood work  He got upset and stated that he will not come in just for us to charge him 200 00 dollars to be seen for blood work to be ordered  He stated that we suck that we took Dr Aditi Stubbs practice from them  I told him to go back to [facility] to get it done again  He told me that he only goes to them because of what he went through with 9/11  He sees them only for his issues with that and not the blood work only if they want him to  Patient was getting upset because I wasn't giving him what he wants  I told him that if I would send a message to the providers that are here that they would need to see you because we don't know your history for us to just order the blood work  He stated forget and then stated have a nice day  I told him have a nice day as well and hung up

## 2022-09-15 NOTE — TELEPHONE ENCOUNTER
Sent message to patient through My Chart to have patient schedule the appointment to get the test order for him

## 2023-02-15 ENCOUNTER — APPOINTMENT (EMERGENCY)
Dept: RADIOLOGY | Facility: HOSPITAL | Age: 58
End: 2023-02-15

## 2023-02-15 ENCOUNTER — HOSPITAL ENCOUNTER (EMERGENCY)
Facility: HOSPITAL | Age: 58
Discharge: HOME | End: 2023-02-16
Attending: EMERGENCY MEDICINE

## 2023-02-15 DIAGNOSIS — J45.901 ASTHMA EXACERBATION: ICD-10-CM

## 2023-02-15 DIAGNOSIS — J32.9 SINUSITIS: ICD-10-CM

## 2023-02-15 DIAGNOSIS — J98.4 RESTRICTIVE LUNG DISEASE: Primary | ICD-10-CM

## 2023-02-15 LAB
ALBUMIN SERPL BCP-MCNC: 4.4 G/DL (ref 3.5–5)
ALP SERPL-CCNC: 74 U/L (ref 34–104)
ALT SERPL W P-5'-P-CCNC: 32 U/L (ref 7–52)
ANION GAP SERPL CALCULATED.3IONS-SCNC: 9 MMOL/L (ref 4–13)
AST SERPL W P-5'-P-CCNC: 20 U/L (ref 13–39)
BASOPHILS # BLD AUTO: 0.06 THOUSANDS/ÂΜL (ref 0–0.1)
BASOPHILS NFR BLD AUTO: 1 % (ref 0–1)
BILIRUB SERPL-MCNC: 0.49 MG/DL (ref 0.2–1)
BUN SERPL-MCNC: 15 MG/DL (ref 5–25)
CALCIUM SERPL-MCNC: 9.5 MG/DL (ref 8.4–10.2)
CHLORIDE SERPL-SCNC: 103 MMOL/L (ref 96–108)
CO2 SERPL-SCNC: 26 MMOL/L (ref 21–32)
CREAT SERPL-MCNC: 1.21 MG/DL (ref 0.6–1.3)
EOSINOPHIL # BLD AUTO: 0.2 THOUSAND/ÂΜL (ref 0–0.61)
EOSINOPHIL NFR BLD AUTO: 2 % (ref 0–6)
ERYTHROCYTE [DISTWIDTH] IN BLOOD BY AUTOMATED COUNT: 14.6 % (ref 11.6–15.1)
GFR SERPL CREATININE-BSD FRML MDRD: 65 ML/MIN/1.73SQ M
GLUCOSE SERPL-MCNC: 99 MG/DL (ref 65–140)
HCT VFR BLD AUTO: 49.4 % (ref 36.5–49.3)
HGB BLD-MCNC: 15.9 G/DL (ref 12–17)
IMM GRANULOCYTES # BLD AUTO: 0.04 THOUSAND/UL (ref 0–0.2)
IMM GRANULOCYTES NFR BLD AUTO: 0 % (ref 0–2)
LYMPHOCYTES # BLD AUTO: 3.06 THOUSANDS/ÂΜL (ref 0.6–4.47)
LYMPHOCYTES NFR BLD AUTO: 32 % (ref 14–44)
MCH RBC QN AUTO: 28.5 PG (ref 26.8–34.3)
MCHC RBC AUTO-ENTMCNC: 32.2 G/DL (ref 31.4–37.4)
MCV RBC AUTO: 89 FL (ref 82–98)
MONOCYTES # BLD AUTO: 1.11 THOUSAND/ÂΜL (ref 0.17–1.22)
MONOCYTES NFR BLD AUTO: 12 % (ref 4–12)
NEUTROPHILS # BLD AUTO: 5.16 THOUSANDS/ÂΜL (ref 1.85–7.62)
NEUTS SEG NFR BLD AUTO: 53 % (ref 43–75)
NRBC BLD AUTO-RTO: 0 /100 WBCS
PLATELET # BLD AUTO: 332 THOUSANDS/UL (ref 149–390)
PMV BLD AUTO: 8.8 FL (ref 8.9–12.7)
POTASSIUM SERPL-SCNC: 3.8 MMOL/L (ref 3.5–5.3)
PROT SERPL-MCNC: 7.8 G/DL (ref 6.4–8.4)
RBC # BLD AUTO: 5.57 MILLION/UL (ref 3.88–5.62)
SODIUM SERPL-SCNC: 138 MMOL/L (ref 135–147)
WBC # BLD AUTO: 9.63 THOUSAND/UL (ref 4.31–10.16)

## 2023-02-15 RX ORDER — PREDNISONE 20 MG/1
40 TABLET ORAL DAILY
Qty: 10 TABLET | Refills: 0 | Status: SHIPPED | OUTPATIENT
Start: 2023-02-15 | End: 2023-02-20

## 2023-02-15 RX ORDER — EPINEPHRINE 0.3 MG/.3ML
0.3 INJECTION SUBCUTANEOUS ONCE
Qty: 0.6 ML | Refills: 0 | Status: SHIPPED | OUTPATIENT
Start: 2023-02-16 | End: 2023-02-16

## 2023-02-15 RX ORDER — SODIUM CHLORIDE FOR INHALATION 0.9 %
3 VIAL, NEBULIZER (ML) INHALATION ONCE
Status: COMPLETED | OUTPATIENT
Start: 2023-02-15 | End: 2023-02-15

## 2023-02-15 RX ORDER — MAGNESIUM SULFATE HEPTAHYDRATE 40 MG/ML
2 INJECTION, SOLUTION INTRAVENOUS ONCE
Status: COMPLETED | OUTPATIENT
Start: 2023-02-15 | End: 2023-02-15

## 2023-02-15 RX ORDER — METHYLPREDNISOLONE SODIUM SUCCINATE 125 MG/2ML
125 INJECTION, POWDER, LYOPHILIZED, FOR SOLUTION INTRAMUSCULAR; INTRAVENOUS ONCE
Status: COMPLETED | OUTPATIENT
Start: 2023-02-15 | End: 2023-02-15

## 2023-02-15 RX ORDER — EPINEPHRINE 1 MG/ML
0.3 INJECTION, SOLUTION, CONCENTRATE INTRAVENOUS ONCE
Status: COMPLETED | OUTPATIENT
Start: 2023-02-15 | End: 2023-02-15

## 2023-02-15 RX ADMIN — IPRATROPIUM BROMIDE 1 MG: 0.5 SOLUTION RESPIRATORY (INHALATION) at 22:14

## 2023-02-15 RX ADMIN — EPINEPHRINE 0.3 MG: 1 INJECTION, SOLUTION, CONCENTRATE INTRAVENOUS at 22:09

## 2023-02-15 RX ADMIN — METHYLPREDNISOLONE SODIUM SUCCINATE 125 MG: 125 INJECTION, POWDER, FOR SOLUTION INTRAMUSCULAR; INTRAVENOUS at 22:11

## 2023-02-15 RX ADMIN — MAGNESIUM SULFATE HEPTAHYDRATE 2 G: 40 INJECTION, SOLUTION INTRAVENOUS at 22:13

## 2023-02-15 RX ADMIN — ISODIUM CHLORIDE 3 ML: 0.03 SOLUTION RESPIRATORY (INHALATION) at 22:14

## 2023-02-15 RX ADMIN — ALBUTEROL SULFATE 10 MG: 2.5 SOLUTION RESPIRATORY (INHALATION) at 22:14

## 2023-02-16 VITALS
HEART RATE: 81 BPM | OXYGEN SATURATION: 97 % | RESPIRATION RATE: 15 BRPM | BODY MASS INDEX: 32.49 KG/M2 | DIASTOLIC BLOOD PRESSURE: 58 MMHG | SYSTOLIC BLOOD PRESSURE: 129 MMHG | TEMPERATURE: 99.1 F | WEIGHT: 220 LBS

## 2023-02-16 RX ORDER — DOXYCYCLINE HYCLATE 100 MG/1
100 CAPSULE ORAL 2 TIMES DAILY
Qty: 14 CAPSULE | Refills: 0 | Status: SHIPPED | OUTPATIENT
Start: 2023-02-16 | End: 2023-02-23

## 2023-02-16 NOTE — ED PROVIDER NOTES
History  Chief Complaint   Patient presents with   • Shortness of Breath     Pt presents c/o SOB for the last hour  Pt states he took 4 puffs of inhaler and 2 nebulizer at home with no relief  O2 normal in triage  Expiratory wheezing upon arrival     HPI patient is a 77-year-old male presenting today with acute shortness of breath history of restrictive lung disease with asthma status post 911 has 48% lung capacity  Began about an hour prior to arrival   Has certain triggers that have become routine for him when they take place  Some can be a mixture of perfumes or deodorants, has been outside today for several hours  Patient is wheezing diffusely and states it feels like his typical severe respiratory attack, DuoNeb inhaler x4 puffs with daily puff of Trelegy in addition to 2 nebulizer treatments which did not help prior to arrival   Epinephrine helps him a great deal on these attacks began  No chest pain, no fevers, no chills, no abdominal pain, no dysuria, no AMS, no headache  Prior to Admission Medications   Prescriptions Last Dose Informant Patient Reported? Taking?    EPINEPHrine (EPIPEN) 0 3 mg/0 3 mL SOAJ   No No   Sig: Inject 0 3 mL (0 3 mg total) into the shoulder, thigh, or buttocks once for 1 dose   Hypertonic Nasal Wash (Sinus Rinse Refill) PACK   Yes No   Trelegy Ellipta 100-62 5-25 MCG/INH inhaler   Yes No   Sig: Inhale 1 puff daily     albuterol (2 5 mg/3 mL) 0 083 % nebulizer solution   Yes No   Sig: Take 2 5 mg by nebulization every 6 (six) hours as needed for wheezing or shortness of breath   albuterol (PROVENTIL HFA,VENTOLIN HFA) 90 mcg/act inhaler   Yes No   Sig: Inhale 2 puffs every 6 (six) hours as needed for wheezing   budesonide (PULMICORT) 0 25 mg/2 mL nebulizer solution   Yes No   Sig: "NOT FOR NEBULIZATION, FOR IRRIGATION ONLY-Mix 1 ampule in 8oz of NaCI, irrigate each nostril with 4oz, twice a day"   cetirizine (ZyrTEC) 10 mg tablet   Yes No   Sig: Take 10 mg by mouth daily diazepam (VALIUM) 5 mg tablet   No No   Sig: Take 1 tablet (5 mg total) by mouth every 12 (twelve) hours as needed for muscle spasms for up to 10 days   famotidine (PEPCID) 40 MG tablet   Yes No   Sig: Take 40 mg by mouth daily    fluticasone (FLONASE) 50 mcg/act nasal spray   Yes No   Si spray into each nostril daily   fluticasone-salmeterol (ADVAIR) 250-50 mcg/dose inhaler   Yes No   Sig: Inhale 1 puff every 12 (twelve) hours  Patient not taking: Reported on 2022    loratadine (CLARITIN) 10 mg tablet   Yes No   Sig: Take 10 mg by mouth daily   montelukast (SINGULAIR) 10 mg tablet   Yes No   Sig: Take 10 mg by mouth daily at bedtime   omeprazole (PriLOSEC) 40 MG capsule   Yes No   Sig: Take 40 mg by mouth daily   ondansetron (ZOFRAN-ODT) 4 mg disintegrating tablet   No No   Sig: Take 1 tablet (4 mg total) by mouth every 8 (eight) hours as needed for nausea or vomiting   sildenafil (VIAGRA) 100 mg tablet   No No   Sig: Take 1 tablet (100 mg total) by mouth daily as needed for erectile dysfunction      Facility-Administered Medications: None       Past Medical History:   Diagnosis Date   • Allergic    • Asthma    • Cancer ([address] )     skin cancer   • Chronic back pain    • GERD (gastroesophageal reflux disease)    • Pancreatitis    • Pulmonary embolism (HCC)    • Spinal cord injury        Past Surgical History:   Procedure Laterality Date   • COLONOSCOPY      [facility]   • SHOULDER SURGERY         Family History   Problem Relation Age of Onset   • Heart disease Paternal Grandmother      I have reviewed and agree with the history as documented      E-Cigarette/Vaping   • E-Cigarette Use Never User      E-Cigarette/Vaping Substances   • Nicotine No    • THC No    • CBD No    • Flavoring No    • Other No    • Unknown No      Social History     Tobacco Use   • Smoking status: Never   • Smokeless tobacco: Never   Vaping Use   • Vaping Use: Never used   Substance Use Topics   • Alcohol use: Not Currently Comment: Occasional   • Drug use: No        Review of Systems    Physical Exam  ED Triage Vitals   Temperature Pulse Respirations Blood Pressure SpO2   02/15/23 2250 02/15/23 2159 02/15/23 2159 02/15/23 2159 02/15/23 2159   99 1 °F (37 3 °C) (!) 108 (!) 26 161/75 98 %      Temp Source Heart Rate Source Patient Position - Orthostatic VS BP Location FiO2 (%)   02/15/23 2250 02/15/23 2159 02/15/23 2159 02/15/23 2159 --   Axillary Monitor Sitting Right arm       Pain Score       02/15/23 2159       No Pain             Orthostatic Vital Signs  Vitals:    02/15/23 2215 02/15/23 2230 02/15/23 2245 02/15/23 2300   BP:  141/63  130/64   Pulse: 98 96 96 92   Patient Position - Orthostatic VS:    Sitting       Physical Exam    ED Medications  Medications   albuterol inhalation solution 10 mg (10 mg Nebulization Given 2/15/23 2214)     And   ipratropium (ATROVENT) 0 02 % inhalation solution 1 mg (1 mg Nebulization Given 2/15/23 2214)     And   sodium chloride 0 9 % inhalation solution 3 mL (3 mL Nebulization Given 2/15/23 2214)   EPINEPHrine PF (ADRENALIN) 1 mg/mL injection 0 3 mg (0 3 mg Intramuscular Given 2/15/23 2209)   magnesium sulfate 2 g/50 mL IVPB (premix) 2 g (0 g Intravenous Stopped 2/15/23 2228)   methylPREDNISolone sodium succinate (Solu-MEDROL) injection 125 mg (125 mg Intravenous Given 2/15/23 2211)       Diagnostic Studies  Results Reviewed     Procedure Component Value Units Date/Time    Comprehensive metabolic panel [[id number]] Collected: 02/15/23 2211    Lab Status: Final result Specimen: Blood from Arm, Left Updated: 02/15/23 2242     Sodium 138 mmol/L      Potassium 3 8 mmol/L      Chloride 103 mmol/L      CO2 26 mmol/L      ANION GAP 9 mmol/L      BUN 15 mg/dL      Creatinine 1 21 mg/dL      Glucose 99 mg/dL      Calcium 9 5 mg/dL      AST 20 U/L      ALT 32 U/L      Alkaline Phosphatase 74 U/L      Total Protein 7 8 g/dL      Albumin 4 4 g/dL      Total Bilirubin 0 49 mg/dL      eGFR 65 ml/min/1 73sq m     Narrative:      National Kid[employer] guidelines for Chronic Kidney Disease (CKD):   •  Stage 1 with normal or high GFR (GFR > 90 mL/min/1 73 square meters)  •  Stage 2 Mild CKD (GFR = 60-89 mL/min/1 73 square meters)  •  Stage 3A Moderate CKD (GFR = 45-59 mL/min/1 73 square meters)  •  Stage 3B Moderate CKD (GFR = 30-44 mL/min/1 73 square meters)  •  Stage 4 Severe CKD (GFR = 15-29 mL/min/1 73 square meters)  •  Stage 5 End Stage CKD (GFR <15 mL/min/1 73 square meters)  Note: GFR calculation is accurate only with a steady state creatinine    CBC and differential [[id number]]  (Abnormal) Collected: 02/15/23 2211    Lab Status: Final result Specimen: Blood from Arm, Left Updated: 02/15/23 2224     WBC 9 63 Thousand/uL      RBC 5 57 Million/uL      Hemoglobin 15 9 g/dL      Hematocrit 49 4 %      MCV 89 fL      MCH 28 5 pg      MCHC 32 2 g/dL      RDW 14 6 %      MPV 8 8 fL      Platelets 121 Thousands/uL      nRBC 0 /100 WBCs      Neutrophils Relative 53 %      Immat GRANS % 0 %      Lymphocytes Relative 32 %      Monocytes Relative 12 %      Eosinophils Relative 2 %      Basophils Relative 1 %      Neutrophils Absolute 5 16 Thousands/µL      Immature Grans Absolute 0 04 Thousand/uL      Lymphocytes Absolute 3 06 Thousands/µL      Monocytes Absolute 1 11 Thousand/µL      Eosinophils Absolute 0 20 Thousand/µL      Basophils Absolute 0 06 Thousands/µL                  XR chest 1 view portable    (Results Pending)         Procedures  Procedures      ED Course     Patient appears much better after epinephrine, massey neb, Solu-Medrol 125 mg, and 2 mg of magnesium administered over 15 minutes  Patient states is his usual type of attack when severe and epinephrine is the most effective treatment                              MDM      Disposition  Final diagnoses:   None     ED Disposition     None      Follow-up Information    None         Patient's Medications   Discharge Prescriptions    No medications on file     No discharge procedures on file  PDMP Review     None           ED Provider  Attending physically available and evaluated Donato Nayely  I managed the patient along with the ED Attending      Electronically Signed by when severe and epinephrine is the most effective treatment  Pt states that he is typically discharged after treated for exacerbations  He does not have any interest in staying, stating he's been through these same presentations many times and would like to go home  Currently not in any respiratory distress  Vitals are stable and wnl upon discharge and no respiratory distress present  Wheezing has resolved  He will follow-up with his PCP and return as necessary  MDM  Restrictive lung disease with obstructive lung disease  Pneumoconiosis, sinusitis, rhinitis, anaphylaxis, allergic reaction  Interstitial lung disease  Pt presented in acute respiratory distress exacerbated by certain scents  Hx of restrictive/interstitial lung disease superimposed on obstructive lung disease  Pt states this is his usual course of exacerbation  Symptoms resolved with epinephrine, massey neb, magnesium sulfate, and solumedrol  He does not want to stay in the hospital as it is his typical presentation  Disposition  Final diagnoses:   Restrictive lung disease   Asthma exacerbation   Sinusitis     Time reflects when diagnosis was documented in both MDM as applicable and the Disposition within this note     Time User Action Codes Description Comment    2/15/2023 11:57 PM Matilde Larson Add [J98 4] Restrictive lung disease     2/15/2023 11:57 PM Matilde Larson Add [Z61 418] Asthma exacerbation     2/16/2023 12:14 AM Mil Hidalgo Add [J32 9] Sinusitis       ED Disposition     ED Disposition   Discharge    Condition   Stable    Date/Time   Thu Feb 16, 2023 12:11 AM    Comment   Donato Adler discharge to home/self care                 Follow-up Information     Follow up With Specialties Details Why Contact Info Additional Information    [facility] Emergency Medicine  As needed [address] [place] [address] [facility], [address], [place], [place], [place]  David Berry 95, DO Internal Medicine, Family Medicine Schedule an appointment as soon as possible for a visit in 3 days  [address] [address] [phone]             Discharge Medication List as of 2/16/2023 12:14 AM      START taking these medications    Details   doxycycline hyclate (VIBRAMYCIN) 100 mg capsule Take 1 capsule (100 mg total) by mouth 2 (two) times a day for 7 days, Starting u 2/16/2023, Until Thu 2/23/2023, Normal      predniSONE 20 mg tablet Take 2 tablets (40 mg total) by mouth daily for 5 days, Starting Wed 2/15/2023, Until Mon 2/20/2023, Normal         CONTINUE these medications which have CHANGED    Details   EPINEPHrine (EPIPEN) 0 3 mg/0 3 mL SOAJ Inject 0 3 mL (0 3 mg total) into a muscle once for 1 dose Do not start before February 16, 2023 , Starting Thu 2/16/2023, Normal         CONTINUE these medications which have NOT CHANGED    Details   albuterol (2 5 mg/3 mL) 0 083 % nebulizer solution Take 2 5 mg by nebulization every 6 (six) hours as needed for wheezing or shortness of breath, Historical Med      albuterol (PROVENTIL HFA,VENTOLIN HFA) 90 mcg/act inhaler Inhale 2 puffs every 6 (six) hours as needed for wheezing, Historical Med      budesonide (PULMICORT) 0 25 mg/2 mL nebulizer solution "NOT FOR NEBULIZATION, FOR IRRIGATION ONLY-Mix 1 ampule in 8oz of NaCI, irrigate each nostril with 4oz, twice a day", Historical Med      cetirizine (ZyrTEC) 10 mg tablet Take 10 mg by mouth daily, Historical Med      diazepam (VALIUM) 5 mg tablet Take 1 tablet (5 mg total) by mouth every 12 (twelve) hours as needed for muscle spasms for up to 10 days, Starting Mon 10/18/2021, Until Fri 1/14/2022 at 2359, Normal      famotidine (PEPCID) 40 MG tablet Take 40 mg by mouth daily , Starting Sat 9/18/2021, Historical Med      fluticasone (FLONASE) 50 mcg/act nasal spray 1 spray into each nostril daily, Historical Med      fluticasone-salmeterol (ADVAIR) 250-50 mcg/dose inhaler Inhale 1 puff every 12 (twelve) hours  , Historical Med      Hypertonic Nasal Wash (Sinus Rinse Refill) PACK Starting u 11/11/2021, Historical Med      loratadine (CLARITIN) 10 mg tablet Take 10 mg by mouth daily, Historical Med      montelukast (SINGULAIR) 10 mg tablet Take 10 mg by mouth daily at bedtime, Historical Med      omeprazole (PriLOSEC) 40 MG capsule Take 40 mg by mouth daily, Historical Med      ondansetron (ZOFRAN-ODT) 4 mg disintegrating tablet Take 1 tablet (4 mg total) by mouth every 8 (eight) hours as needed for nausea or vomiting, Starting Mon 2/17/2020, Print      sildenafil (VIAGRA) 100 mg tablet Take 1 tablet (100 mg total) by mouth daily as needed for erectile dysfunction, Starting Mon 10/18/2021, Normal      Trelegy Ellipta 100-62 5-25 MCG/INH inhaler Inhale 1 puff daily  , Starting Fri 12/17/2021, Historical Med           No discharge procedures on file  PDMP Review     None           ED Provider  Attending physically available and evaluated Donato Adler  RENE managed the patient along with the ED Attending      Electronically Signed by         Mrita Phipps DO  02/23/23 1389

## 2023-02-16 NOTE — ED ATTENDING ATTESTATION
2/15/2023  IVickie DO, saw and evaluated the patient  I have discussed the patient with the resident/non-physician practitioner and agree with the resident's/non-physician practitioner's findings, Plan of Care, and MDM as documented in the resident's/non-physician practitioner's note, except where noted  All available labs and Radiology studies were reviewed  I was present for key portions of any procedure(s) performed by the resident/non-physician practitioner and I was immediately available to provide assistance  At this point I agree with the current assessment done in the Emergency Department  I have conducted an independent evaluation of this patient a history and physical is as follows:    Patient is a 48-year-old male with a history of restrictive lung disease who presents with shortness of breath  Patient states that he has had a postnasal drip and believes that this triggered his chronic lung disease  He used albuterol inhaler and nebulized treatments at home without relief  He states that he has had similar symptoms previously,  and they improved with epinephrine  He denies any swelling or rash  He denies fevers, chills  He does admit to a nonproductive cough on exam, patient is in moderate respiratory distress  He speaks in short sentences       Inspiratory and expiratory wheezing  Heart is tachycardic, regular rhythm  No evidence of angioedema  No urticarial rash  Abdomen is soft, nontender, nondistended  No rebound or guarding  Patient was treated with bronchodilators, steroids, IV magnesium and IM epinephrine  Patient had almost complete resolution of symptoms and he states that he feels much better  He was observed for period of time, but ultimately requested discharge  He states that he has medications at home that he will continue to use  Will prescribe a short course of steroids as well as epinephrine    Patient agrees to follow-up with his pulmonologist and return to ED immediately if symptoms recur  Portions of the above record have been created with voice recognition software  Occasional wrong word or "sound alike" substitutions may have occurred due to the inherent limitations of voice recognition software  Read the chart carefully and recognize, using context, where substitutions may have occurred  ED Course  ED Course as of 02/16/23 0025   u Feb 16, 2023   0014 Spoke with patient regarding continued observation in the emergency department  Patient states that he is feeling 100% better and wants to go home  He states that he has treated this many times before and is comfortable with treating at home  Will prescribe a steroid and EpiPen  Patient also requesting antibiotics for sinusitis  Critical Care Time  ECG 12 Lead Documentation Only    Date/Time: 2/16/2023 12:25 AM  Performed by: Michaelle Carmona DO  Authorized by: Michaelle Carmona DO     ECG reviewed by me, the ED Provider: yes    Patient location:  ED  Previous ECG:     Previous ECG:  Unavailable  Comments:      Normal sinus rhythm at a rate of 93 bpm   Normal intervals  Normal axis  Normal QRS  No ST-T wave abnormalities  No old for comparison

## 2023-02-17 LAB
ATRIAL RATE: 93 BPM
P AXIS: 32 DEGREES
PR INTERVAL: 154 MS
QRS AXIS: 14 DEGREES
QRSD INTERVAL: 88 MS
QT INTERVAL: 330 MS
QTC INTERVAL: 410 MS
T WAVE AXIS: 58 DEGREES
VENTRICULAR RATE: 93 BPM

## 2023-07-05 ENCOUNTER — OFFICE VISIT (OUTPATIENT)
Dept: INTERNAL MEDICINE CLINIC | Age: 58
End: 2023-07-05
Payer: COMMERCIAL

## 2023-07-05 VITALS
OXYGEN SATURATION: 97 % | WEIGHT: 227.6 LBS | BODY MASS INDEX: 33.71 KG/M2 | TEMPERATURE: 98.4 F | DIASTOLIC BLOOD PRESSURE: 78 MMHG | SYSTOLIC BLOOD PRESSURE: 122 MMHG | HEIGHT: 69 IN | HEART RATE: 66 BPM

## 2023-07-05 DIAGNOSIS — Z12.5 SCREENING PSA (PROSTATE SPECIFIC ANTIGEN): ICD-10-CM

## 2023-07-05 DIAGNOSIS — T75.3XXA MOTION SICKNESS, INITIAL ENCOUNTER: Primary | ICD-10-CM

## 2023-07-05 PROCEDURE — 99213 OFFICE O/P EST LOW 20 MIN: CPT

## 2023-07-05 PROCEDURE — 3725F SCREEN DEPRESSION PERFORMED: CPT

## 2023-07-05 RX ORDER — ONDANSETRON 4 MG/1
4 TABLET, ORALLY DISINTEGRATING ORAL EVERY 8 HOURS PRN
Qty: 20 TABLET | Refills: 0 | Status: SHIPPED | OUTPATIENT
Start: 2023-07-05 | End: 2023-07-05

## 2023-07-05 RX ORDER — ONDANSETRON 4 MG/1
4 TABLET, ORALLY DISINTEGRATING ORAL EVERY 8 HOURS PRN
Qty: 20 TABLET | Refills: 0 | Status: SHIPPED | OUTPATIENT
Start: 2023-07-05

## 2023-07-05 RX ORDER — SCOLOPAMINE TRANSDERMAL SYSTEM 1 MG/1
1 PATCH, EXTENDED RELEASE TRANSDERMAL
Qty: 6 PATCH | Refills: 0 | Status: SHIPPED | OUTPATIENT
Start: 2023-07-05

## 2023-07-05 RX ORDER — AZELASTINE HYDROCHLORIDE 137 UG/1
SPRAY, METERED NASAL
COMMUNITY
Start: 2023-05-31

## 2023-07-05 NOTE — ASSESSMENT & PLAN NOTE
Patient presents today for motion sickness patches as he is going on vacation shortly and will be on a 10-day cruise. Plan:   Scopolamine patches prescribed  Zofran also sent to the pharmacy for nausea  Counseled patient to not take scopolamine patches with Dramamine or as needed Valium.

## 2023-10-01 ENCOUNTER — APPOINTMENT (EMERGENCY)
Dept: CT IMAGING | Facility: HOSPITAL | Age: 58
End: 2023-10-01
Payer: COMMERCIAL

## 2023-10-01 ENCOUNTER — HOSPITAL ENCOUNTER (EMERGENCY)
Facility: HOSPITAL | Age: 58
Discharge: HOME | End: 2023-10-01
Attending: EMERGENCY MEDICINE
Payer: COMMERCIAL

## 2023-10-01 ENCOUNTER — APPOINTMENT (EMERGENCY)
Dept: RADIOLOGY | Facility: HOSPITAL | Age: 58
End: 2023-10-01
Payer: COMMERCIAL

## 2023-10-01 VITALS
BODY MASS INDEX: 34.29 KG/M2 | DIASTOLIC BLOOD PRESSURE: 70 MMHG | OXYGEN SATURATION: 99 % | WEIGHT: 231.48 LBS | SYSTOLIC BLOOD PRESSURE: 142 MMHG | HEART RATE: 70 BPM | RESPIRATION RATE: 20 BRPM | HEIGHT: 69 IN | TEMPERATURE: 98.2 F
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
## 2023-10-01 DIAGNOSIS — L03.90 CELLULITIS: ICD-10-CM

## 2023-10-01 LAB
ANION GAP SERPL CALCULATED.3IONS-SCNC: 5 MMOL/L
APTT PPP: 30 SECONDS (ref 23–37)
BASOPHILS # BLD AUTO: 0.05 THOUSANDS/ÂΜL (ref 0–0.1)
BASOPHILS NFR BLD AUTO: 1 % (ref 0–1)
BUN SERPL-MCNC: 13 MG/DL (ref 5–25)
CALCIUM SERPL-MCNC: 9.1 MG/DL (ref 8.4–10.2)
CHLORIDE SERPL-SCNC: 103 MMOL/L (ref 96–108)
CO2 SERPL-SCNC: 29 MMOL/L (ref 21–32)
CREAT SERPL-MCNC: 1.02 MG/DL (ref 0.6–1.3)
EOSINOPHIL # BLD AUTO: 0.26 THOUSAND/ÂΜL (ref 0–0.61)
EOSINOPHIL NFR BLD AUTO: 4 % (ref 0–6)
ERYTHROCYTE [DISTWIDTH] IN BLOOD BY AUTOMATED COUNT: 15 % (ref 11.6–15.1)
GFR SERPL CREATININE-BSD FRML MDRD: 80 ML/MIN/1.73SQ M
GLUCOSE SERPL-MCNC: 80 MG/DL (ref 65–140)
HCT VFR BLD AUTO: 46.7 % (ref 36.5–49.3)
HGB BLD-MCNC: 15 G/DL (ref 12–17)
IMM GRANULOCYTES # BLD AUTO: 0.02 THOUSAND/UL (ref 0–0.2)
IMM GRANULOCYTES NFR BLD AUTO: 0 % (ref 0–2)
INR PPP: 0.95 (ref 0.84–1.19)
LACTATE SERPL-SCNC: 1 MMOL/L (ref 0.5–2)
LYMPHOCYTES # BLD AUTO: 1.7 THOUSANDS/ÂΜL (ref 0.6–4.47)
LYMPHOCYTES NFR BLD AUTO: 29 % (ref 14–44)
MCH RBC QN AUTO: 28.5 PG (ref 26.8–34.3)
MCHC RBC AUTO-ENTMCNC: 32.1 G/DL (ref 31.4–37.4)
MCV RBC AUTO: 89 FL (ref 82–98)
MONOCYTES # BLD AUTO: 0.85 THOUSAND/ÂΜL (ref 0.17–1.22)
MONOCYTES NFR BLD AUTO: 14 % (ref 4–12)
NEUTROPHILS # BLD AUTO: 3.03 THOUSANDS/ÂΜL (ref 1.85–7.62)
NEUTS SEG NFR BLD AUTO: 52 % (ref 43–75)
NRBC BLD AUTO-RTO: 0 /100 WBCS
PLATELET # BLD AUTO: 263 THOUSANDS/UL (ref 149–390)
PMV BLD AUTO: 8.8 FL (ref 8.9–12.7)
POTASSIUM SERPL-SCNC: 3.8 MMOL/L (ref 3.5–5.3)
PROTHROMBIN TIME: 13.2 SECONDS (ref 11.6–14.5)
RBC # BLD AUTO: 5.27 MILLION/UL (ref 3.88–5.62)
SODIUM SERPL-SCNC: 137 MMOL/L (ref 135–147)
WBC # BLD AUTO: 5.91 THOUSAND/UL (ref 4.31–10.16)

## 2023-10-01 PROCEDURE — 96361 HYDRATE IV INFUSION ADD-ON: CPT

## 2023-10-01 PROCEDURE — 80048 BASIC METABOLIC PNL TOTAL CA: CPT | Performed by: PHYSICIAN ASSISTANT

## 2023-10-01 PROCEDURE — 99283 EMERGENCY DEPT VISIT LOW MDM: CPT

## 2023-10-01 PROCEDURE — 70491 CT SOFT TISSUE NECK W/DYE: CPT

## 2023-10-01 PROCEDURE — 36415 COLL VENOUS BLD VENIPUNCTURE: CPT | Performed by: PHYSICIAN ASSISTANT

## 2023-10-01 PROCEDURE — 85730 THROMBOPLASTIN TIME PARTIAL: CPT | Performed by: PHYSICIAN ASSISTANT

## 2023-10-01 PROCEDURE — 71045 X-RAY EXAM CHEST 1 VIEW: CPT

## 2023-10-01 PROCEDURE — 87040 BLOOD CULTURE FOR BACTERIA: CPT | Performed by: PHYSICIAN ASSISTANT

## 2023-10-01 PROCEDURE — 83605 ASSAY OF LACTIC ACID: CPT | Performed by: PHYSICIAN ASSISTANT

## 2023-10-01 PROCEDURE — 85025 COMPLETE CBC W/AUTO DIFF WBC: CPT | Performed by: PHYSICIAN ASSISTANT

## 2023-10-01 PROCEDURE — 85610 PROTHROMBIN TIME: CPT | Performed by: PHYSICIAN ASSISTANT

## 2023-10-01 PROCEDURE — 96360 HYDRATION IV INFUSION INIT: CPT

## 2023-10-01 PROCEDURE — 99285 EMERGENCY DEPT VISIT HI MDM: CPT | Performed by: PHYSICIAN ASSISTANT

## 2023-10-01 RX ORDER — SODIUM CHLORIDE 9 MG/ML
125 INJECTION, SOLUTION INTRAVENOUS CONTINUOUS
Status: DISCONTINUED | OUTPATIENT
Start: 2023-10-01 | End: 2023-10-01 | Stop reason: HOSPADM

## 2023-10-01 RX ORDER — DOXYCYCLINE HYCLATE 100 MG/1
100 CAPSULE ORAL ONCE
Status: COMPLETED | OUTPATIENT
Start: 2023-10-01 | End: 2023-10-01

## 2023-10-01 RX ORDER — DOXYCYCLINE HYCLATE 100 MG/1
100 CAPSULE ORAL 2 TIMES DAILY
Qty: 14 CAPSULE | Refills: 0 | Status: SHIPPED | OUTPATIENT
Start: 2023-10-01 | End: 2023-10-08

## 2023-10-01 RX ADMIN — DOXYCYCLINE 100 MG: 100 CAPSULE ORAL at 18:38

## 2023-10-01 RX ADMIN — SODIUM CHLORIDE 125 ML/HR: 0.9 INJECTION, SOLUTION INTRAVENOUS at 15:44

## 2023-10-01 RX ADMIN — IOHEXOL 85 ML: 350 INJECTION, SOLUTION INTRAVENOUS at 16:08

## 2023-10-01 NOTE — ED PROVIDER NOTES
History  Chief Complaint   Patient presents with   • Insect Bite - Marked Reaction     Pt presents to the ED with reports of insect bite to the right cheek with reports of tenderness and swelling to surrounding area. Onset x 3 days ago. History provided by:  Patient  Rash  Location:  Face  Facial rash location: right temporal area. Quality: redness and swelling    Severity:  Moderate  Onset quality:  Sudden  Duration:  3 days  Timing:  Constant  Progression:  Spreading  Chronicity:  New  Context: insect bite/sting    Context comment:  Spider bite  Relieved by:  Nothing  Worsened by:  Contact  Ineffective treatments:  None tried  Associated symptoms: shortness of breath and wheezing    Associated symptoms: no abdominal pain, no diarrhea, no fatigue, no fever, no headaches, no hoarse voice, no induration, no joint pain, no myalgias, no nausea, no periorbital edema, no sore throat, no throat swelling, no tongue swelling, no URI and not vomiting    Wheezing:     Severity:  Mild    Onset quality: chronic internittent wheezing secondary to enviormental, chemical  asthma after 911. Duration: years. Timing:  Intermittent    Progression:  Waxing and waning    Chronicity:  Chronic      Prior to Admission Medications   Prescriptions Last Dose Informant Patient Reported? Taking? Azelastine HCl 137 MCG/SPRAY SOLN   Yes No   Sig: PLEASE SEE ATTACHED FOR DETAILED DIRECTIONS   EPINEPHrine (EPIPEN) 0.3 mg/0.3 mL SOAJ   No No   Sig: Inject 0.3 mL (0.3 mg total) into a muscle once for 1 dose Do not start before February 16, 2023.    Hypertonic Nasal Wash (Sinus Rinse Refill) PACK  Self Yes No   Trelegy Ellipta 100-62.5-25 MCG/INH inhaler  Self Yes No   Sig: Inhale 1 puff daily     albuterol (2.5 mg/3 mL) 0.083 % nebulizer solution  Self Yes No   Sig: Take 2.5 mg by nebulization every 6 (six) hours as needed for wheezing or shortness of breath   albuterol (PROVENTIL HFA,VENTOLIN HFA) 90 mcg/act inhaler  Self Yes No Sig: Inhale 2 puffs every 6 (six) hours as needed for wheezing   budesonide (PULMICORT) 0.25 mg/2 mL nebulizer solution  Self Yes No   Sig: "NOT FOR NEBULIZATION, FOR IRRIGATION ONLY-Mix 1 ampule in 8oz of NaCI, irrigate each nostril with 4oz, twice a day"   cetirizine (ZyrTEC) 10 mg tablet  Self Yes No   Sig: Take 10 mg by mouth daily   diazepam (VALIUM) 5 mg tablet  Self No No   Sig: Take 1 tablet (5 mg total) by mouth every 12 (twelve) hours as needed for muscle spasms for up to 10 days   famotidine (PEPCID) 40 MG tablet  Self Yes No   Sig: Take 80 mg by mouth daily   fluticasone (FLONASE) 50 mcg/act nasal spray  Self Yes No   Si spray into each nostril daily   Patient not taking: Reported on 2023   fluticasone-salmeterol (ADVAIR) 250-50 mcg/dose inhaler  Self Yes No   Sig: Inhale 1 puff every 12 (twelve) hours.    Patient not taking: Reported on 2022   loratadine (CLARITIN) 10 mg tablet  Self Yes No   Sig: Take 10 mg by mouth daily   montelukast (SINGULAIR) 10 mg tablet  Self Yes No   Sig: Take 10 mg by mouth daily at bedtime   omeprazole (PriLOSEC) 40 MG capsule  Self Yes No   Sig: Take 40 mg by mouth daily   ondansetron (ZOFRAN-ODT) 4 mg disintegrating tablet   No No   Sig: Take 1 tablet (4 mg total) by mouth every 8 (eight) hours as needed for nausea or vomiting   scopolamine (TRANSDERM-SCOP) 1 mg/3 days TD 72 hr patch   No No   Sig: Place 1 patch on the skin over 72 hours every third day   sildenafil (VIAGRA) 100 mg tablet  Self No No   Sig: Take 1 tablet (100 mg total) by mouth daily as needed for erectile dysfunction      Facility-Administered Medications: None       Past Medical History:   Diagnosis Date   • Allergic    • Asthma    • Cancer ([address])     skin cancer   • Chronic back pain    • GERD (gastroesophageal reflux disease)    • Pancreatitis    • Pulmonary embolism (HCC)    • Spinal cord injury        Past Surgical History:   Procedure Laterality Date   • COLONOSCOPY      [facility]   • SHOULDER SURGERY         Family History   Problem Relation Age of Onset   • Heart disease Paternal Grandmother      I have reviewed and agree with the history as documented. E-Cigarette/Vaping   • E-Cigarette Use Never User      E-Cigarette/Vaping Substances   • Nicotine No    • THC No    • CBD No    • Flavoring No    • Other No    • Unknown No      Social History     Tobacco Use   • Smoking status: Never   • Smokeless tobacco: Never   Vaping Use   • Vaping Use: Never used   Substance Use Topics   • Alcohol use: Not Currently     Comment: Occasional   • Drug use: No       Review of Systems   Constitutional: Negative for activity change, appetite change, chills, diaphoresis, fatigue and fever. HENT: Negative for congestion, ear discharge, ear pain, hoarse voice, postnasal drip, rhinorrhea and sore throat. Respiratory: Positive for chest tightness, shortness of breath and wheezing. Negative for cough. Cardiovascular: Negative for chest pain, palpitations and leg swelling. Gastrointestinal: Negative for abdominal pain, diarrhea, nausea and vomiting. Musculoskeletal: Negative for arthralgias and myalgias. Skin: Positive for color change and rash. Neurological: Negative for headaches. Hematological: Positive for adenopathy. Psychiatric/Behavioral: Negative for confusion. All other systems reviewed and are negative. Physical Exam  Physical Exam  Vitals and nursing note reviewed. Constitutional:       General: He is not in acute distress. Appearance: Normal appearance. He is obese. He is not ill-appearing or toxic-appearing. HENT:      Head: Normocephalic and atraumatic. Right Ear: External ear normal.      Left Ear: External ear normal.      Nose: No congestion or rhinorrhea. Mouth/Throat:      Pharynx: No oropharyngeal exudate or posterior oropharyngeal erythema. Eyes:      General:         Right eye: No discharge. Left eye: No discharge. Conjunctiva/sclera: Conjunctivae normal.   Cardiovascular:      Rate and Rhythm: Normal rate and regular rhythm. Heart sounds: Normal heart sounds. Pulmonary:      Effort: Pulmonary effort is normal.      Breath sounds: No wheezing, rhonchi or rales. Musculoskeletal:      Cervical back: Neck supple. No rigidity. Lymphadenopathy:      Cervical: Cervical adenopathy present. Skin:     Capillary Refill: Capillary refill takes less than 2 seconds. Findings: Erythema present. Comments: Healing insect bite x 2 right temporal area, tender on palpation. Honey colored crusting. No active draining, reactive swelling over the submandibular area and anterior cervical chains. Tender upon palpation. Questionable reactive lymphadenopathy vs abscess formation. Neurological:      General: No focal deficit present. Mental Status: He is alert and oriented to person, place, and time. Psychiatric:         Mood and Affect: Mood normal.         Behavior: Behavior normal.         Thought Content:  Thought content normal.         Judgment: Judgment normal.         Vital Signs  ED Triage Vitals   Temperature Pulse Respirations Blood Pressure SpO2   10/01/23 1210 10/01/23 1210 10/01/23 1210 10/01/23 1210 10/01/23 1210   98.2 °F (36.8 °C) 65 20 156/80 98 %      Temp Source Heart Rate Source Patient Position - Orthostatic VS BP Location FiO2 (%)   10/01/23 1210 10/01/23 1210 10/01/23 1210 10/01/23 1210 --   Oral Monitor Sitting Right arm       Pain Score       10/01/23 1809       2           Vitals:    10/01/23 1210 10/01/23 1809   BP: 156/80 142/70   Pulse: 65 70   Patient Position - Orthostatic VS: Sitting Sitting         Visual Acuity      ED Medications  Medications   iohexol (OMNIPAQUE) 350 MG/ML injection (MULTI-DOSE) 85 mL (85 mL Intravenous Given 10/1/23 1608)   doxycycline hyclate (VIBRAMYCIN) capsule 100 mg (100 mg Oral Given 10/1/23 1838)       Diagnostic Studies  Results Reviewed     Procedure Component Value Units Date/Time    Blood culture #1 [[id number]] Collected: 10/01/23 1311    Lab Status: Preliminary result Specimen: Blood from Arm, Left Updated: 10/01/23 1801     Blood Culture Received in Microbiology Lab. Culture in Progress. Blood culture #2 [[id number]] Collected: 10/01/23 1311    Lab Status: Preliminary result Specimen: Blood from Arm, Right Updated: 10/01/23 1801     Blood Culture Received in Microbiology Lab. Culture in Progress. Basic metabolic panel [[id number]] Collected: 10/01/23 1451    Lab Status: Final result Specimen: Blood from Line Updated: 10/01/23 1523     Sodium 137 mmol/L      Potassium 3.8 mmol/L      Chloride 103 mmol/L      CO2 29 mmol/L      ANION GAP 5 mmol/L      BUN 13 mg/dL      Creatinine 1.02 mg/dL      Glucose 80 mg/dL      Calcium 9.1 mg/dL      eGFR 80 ml/min/1.73sq m     Narrative:      [place] guidelines for Chronic Kidney Disease (CKD):   •  Stage 1 with normal or high GFR (GFR > 90 mL/min/1.73 square meters)  •  Stage 2 Mild CKD (GFR = 60-89 mL/min/1.73 square meters)  •  Stage 3A Moderate CKD (GFR = 45-59 mL/min/1.73 square meters)  •  Stage 3B Moderate CKD (GFR = 30-44 mL/min/1.73 square meters)  •  Stage 4 Severe CKD (GFR = 15-29 mL/min/1.73 square meters)  •  Stage 5 End Stage CKD (GFR <15 mL/min/1.73 square meters)  Note: GFR calculation is accurate only with a steady state creatinine    Lactic acid, plasma (w/reflex if result > 2.0) [[id number]]  (Normal) Collected: 10/01/23 1311    Lab Status: Final result Specimen: Blood from Arm, Left Updated: 10/01/23 1339     LACTIC ACID 1.0 mmol/L     Narrative:      Result may be elevated if tourniquet was used during collection.     Protime-INR [[id number]]  (Normal) Collected: 10/01/23 1311    Lab Status: Final result Specimen: Blood from Arm, Left Updated: 10/01/23 1337     Protime 13.2 seconds      INR 0.95    APTT [[id number]]  (Normal) Collected: 10/01/23 7102    Lab Status: Final result Specimen: Blood from Arm, Left Updated: 10/01/23 1337     PTT 30 seconds     CBC and differential [[id number]]  (Abnormal) Collected: 10/01/23 1311    Lab Status: Final result Specimen: Blood from Arm, Left Updated: 10/01/23 1323     WBC 5.91 Thousand/uL      RBC 5.27 Million/uL      Hemoglobin 15.0 g/dL      Hematocrit 46.7 %      MCV 89 fL      MCH 28.5 pg      MCHC 32.1 g/dL      RDW 15.0 %      MPV 8.8 fL      Platelets 980 Thousands/uL      nRBC 0 /100 WBCs      Neutrophils Relative 52 %      Immat GRANS % 0 %      Lymphocytes Relative 29 %      Monocytes Relative 14 %      Eosinophils Relative 4 %      Basophils Relative 1 %      Neutrophils Absolute 3.03 Thousands/µL      Immature Grans Absolute 0.02 Thousand/uL      Lymphocytes Absolute 1.70 Thousands/µL      Monocytes Absolute 0.85 Thousand/µL      Eosinophils Absolute 0.26 Thousand/µL      Basophils Absolute 0.05 Thousands/µL                  CT soft tissue neck with contrast   ED Interpretation by Chago Eugene PA-C (10/01 1830)   CT soft tissue neck with contrast  Status: Final result    PACS Images     Show images for CT soft tissue neck with contrast  Study Result    Narrative & Impression  CT NECK WITH CONTRAST     INDICATION:   insect bite right side of the face, right submandibular swelling and pain, reactive lymphadenopathy vs abscess.     COMPARISON:  None.     TECHNIQUE:  Axial, sagittal, and coronal 2D reformatted images were created from the axial source data and submitted for interpretation.     Radiation dose length product (DLP) for this visit:  650 mGy-cm .   This examination, like all CT scans performed in the [facility], was performed utilizing techniques to minimize radiation dose exposure, including the use of iterative   reconstruction and automated exposure control.     IV Contrast:  85 mL of iohexol (OMNIPAQUE)     IMAGE QUALITY:  Diagnostic.     FINDINGS:     VISUALIZED BRAIN PARENCHYMA:  No acute intracranial pathology of the visualized brain parenchyma.     VISUALIZED OR   BITS: Normal visualized orbits.     PARANASAL SINUSES: Normal visualized paranasal sinuses.     NASAL CAVITY AND NASOPHARYNX:  Normal.     SUPRAHYOID NECK:  Normal oral cavity, tongue base, tonsillar fossa and epiglottis.     INFRAHYOID NECK:  Aryepiglottic folds and piriform sinuses are normal.  Normal glottis and subglottic airway.     THYROID GLAND:  Unremarkable.     PAROTID AND SUBMANDIBULAR GLANDS:  Normal.     LYMPH NODES:  No pathologic or enlarged adenopathy.     VASCULAR STRUCTURES:  Normal enhancement of the cervical vasculature.     THORACIC INLET:  Lung apices and upper mediastinum are unremarkable.     BONY STRUCTURES: No acute fracture or destructive osseous lesion.     IMPRESSION:     Focal right facial skin soft tissue thickening could relate to infection/cellulitis. No underlying fluid collection.           Workstation performed: [id number]       Imaging    CT soft tissue neck with contrast (Order: [id number]) - 10/1/2023    Result History    CT soft tissue neck with c   ontrast (Order #[id number]) on 10/1/2023 - Order Result History Report    Order Report    Order Details  Order Questions    Question Answer  What is the patient's sedation requirement? If Medication for Claustrophobia is selected, order medication at this point. No Sedation  Contrast Information: IV ONLY  Note: If this is a PO only order, please change to a CT ABDOMEN PELVIS WO  Did the patient ever have a reaction to x-ray dye? If yes, please verify the type of allergy and order the contrast allergy prep. No  Note: If yes, please verify the type of allergy and order the contrast allergy prep.   Release to patient through Mychart Immediate  Exam reason insect bite right side of the face, right submandibular swelling and pain, reactive lymphadenopathy vs abscess  Note: Enter reason for exam  Decision Support Exception Emergency Medical Condition (MA)         Result Information    Status Priority Source  Final result (10/1/2023 1805) STAT     Other Results from 10/1/2023     Basic metabol   ic panel  Final result 10/1/2023   CBC and differential  Final result 10/1/2023   Protime-INR  Final result 10/1/2023   APTT  Final result 10/1/2023   Blood culture #1  Preliminary result 10/1/2023   Blood culture #2  Preliminary result 10/1/2023   Lactic acid, plasma (w/reflex if result > 2.0)  Final result 10/1/2023    Reason for Exam    insect bite right side of the face, right submandibular swelling and pain, reactive lymphadenopathy vs abscess        CT soft tissue neck with contrast: Patient Communication    Add Comments  Add Notifications         Signed by    Signed Date/Time  Phone Pager  Aaron Loera, [address] [place] [address] 10/01/2023 18:05 [phone]       Exam Information    Status Exam Begun  Exam Ended  Performing Tech  Final [99] 10/01/2023 16:00 10/01/2023 16:04 [place] [employer]      Screening Form Questions    No questions have been answered for this form. External Results Report      Open External Results Report     Encounter      View Encounter                 Patient Care Timeline    N   o data selected in time range    Pre-op Summary      Pre-op            Recovery Summary      Recovery             Routing History    None            Final Result by Kenia Chang MD (10/01 1805)      Focal right facial skin soft tissue thickening could relate to infection/cellulitis. No underlying fluid collection. Workstation performed: [id number]         XR chest 1 view portable   ED Interpretation by Jessica Bernal PA-C (10/01 1422)   Poor inspiration, cardiomegaly, no acute cardiopulmonary disease                 Procedures  Procedures         ED Course                               SBIRT 20yo+    Flowsheet Row Most Recent Value   Initial Alcohol Screen: US AUDIT-C     1. How often do you have a drink containing alcohol? 0 Filed at: 10/01/2023 1803   2.  How many drinks containing alcohol do you have on a typical day you are drinking? 0 Filed at: 10/01/2023 1803   3a. Male UNDER 65: How often do you have five or more drinks on one occasion? 0 Filed at: 10/01/2023 1803   3b. FEMALE Any Age, or MALE 65+: How often do you have 4 or more drinks on one occassion? 0 Filed at: 10/01/2023 1803   Audit-C Score 0 Filed at: 10/01/2023 1803   RADHA: How many times in the past year have you. .. Used an illegal drug or used a prescription medication for non-medical reasons? 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 He has had chronic medical issues since that time. He is very sensitive to smell and environmental chemicals. He has used his inhaler 2 times since his arrival here in the emergency room. He presents with a history of a insect bite on his face over his right temporal area that started 3 days ago. Over the past 3 days the area has been getting worse. He complains of local redness at the site some soft tissue swelling. There is swelling to his right submandibular and neck area that he is complaining about. He complains of local tenderness. He is concerned that he is going to have/tissue swelling that is going to interfere with his ability to breathe as he states he always has swelling of his epiglottis since 911 as a . He denies any fever or chills. He denies any chest pain or shortness of breath. He denies any nausea or vomiting. Patient is handling his secretions with no difficulty. Past medical history is positive for allergies, asthma, skin cancer, chronic back pain, GERD, pancreatitis, pulmonary embolism, spinal cord injury. This 54-year-old male is alert and oriented x3. He is in no acute distress. His airway is intact.   His face has some erythema with 2 insect bites present over the right temporal area. There is some mild soft tissue swelling and tenderness at the site. There is some honey colored dry crusting present over the area. There is associated swelling over the right mid submandibular area as well as the anterior chains of the cervical lymph nodes. Whether or not this is reactive adenopathy versus an abscess is difficult to evaluate on exam.  The area is exquisitely tender upon palpation. Patient's lungs are clear to auscultation. There is no rales rhonchi's or wheezes. Patient has increased forced expiratory wheezing with a deep breath. He excessively coughs when he takes too deep of a breath. This is chronic for him. Patient has a normal gait. He is good range of motion of his upper and lower extremities. Laboratory studies were taken and were reviewed. A CT of the neck was positive for localized cellulitis to the face. There was delay with the CAT scan results due to multiple traumas. I did explain the risk to the patient who understand with the reason for the wait. Impression  Insect bites with secondary localized cellulitis    Plan  Patient was discharged home with a prescription for doxycycline 100 mg twice daily for 7 days. He will apply warm compresses to the area for 20 minutes 3-4 times a day. He was given reasons to return to the emergency room should his symptoms worsen. Cellulitis: acute illness or injury  Insect bite: acute illness or injury  Amount and/or Complexity of Data Reviewed  Labs: ordered. Details: Independently interpreted by me. Radiology: ordered and independent interpretation performed. Details: Independently interpreted by the radiologist.  Localized cellulitis to the insect bite right face      Risk  Prescription drug management.           Disposition  Final diagnoses:   Insect bite - face   Cellulitis - localized     Time reflects when diagnosis was documented in both MDM as applicable and the Disposition within this note Time User Action Codes Description Comment    10/1/2023  6:31 PM Lyubovdavid Black. NLKN] Insect bite     10/1/2023  6:31 PM Aline De Leon. XXXA] Insect bite face    10/1/2023  6:31 PM Juana Sprung Add [L03.90] Cellulitis     10/1/2023  6:31 PM Juana Sprung Modify [L03.90] Cellulitis localized      ED Disposition     ED Disposition   Discharge    Condition   Stable    Date/Time   Sun Oct 1, 2023  6:33 PM    Comment   Donato Nayely discharge to home/self care.                Follow-up Information     Follow up With Specialties Details Why Contact Info Additional [address], [address] [facility], Nurse Practitioner Schedule an appointment as soon as possible for a visit in 2 days  [address] [place] [facility] [address]  [phone]       [address] [facility] Emergency Department Emergency Medicine  As needed, If symptoms worsen [address] [facility], [place], [place], [address]          Discharge Medication List as of 10/1/2023  6:34 PM      START taking these medications    Details   doxycycline hyclate (VIBRAMYCIN) 100 mg capsule Take 1 capsule (100 mg total) by mouth 2 (two) times a day for 7 days, Starting Sun 10/1/2023, Until Sun 10/8/2023, Normal         CONTINUE these medications which have NOT CHANGED    Details   albuterol (2.5 mg/3 mL) 0.083 % nebulizer solution Take 2.5 mg by nebulization every 6 (six) hours as needed for wheezing or shortness of breath, Historical Med      albuterol (PROVENTIL HFA,VENTOLIN HFA) 90 mcg/act inhaler Inhale 2 puffs every 6 (six) hours as needed for wheezing, Historical Med      Azelastine HCl 137 MCG/SPRAY SOLN PLEASE SEE ATTACHED FOR DETAILED DIRECTIONS, Historical Med      budesonide (PULMICORT) 0.25 mg/2 mL nebulizer solution "NOT FOR NEBULIZATION, FOR IRRIGATION ONLY-Mix 1 ampule in [address] [facility], irrigate each nostril with 4oz, twice a day", Historical Med      cetirizine (ZyrTEC) 10 mg tablet Take 10 mg by mouth daily, Historical Med      diazepam (VALIUM) 5 mg tablet Take 1 tablet (5 mg total) by mouth every 12 (twelve) hours as needed for muscle spasms for up to 10 days, Starting Mon 10/18/2021, Until Wed 7/5/2023 at 2359, Normal      EPINEPHrine (EPIPEN) 0.3 mg/0.3 mL SOAJ Inject 0.3 mL (0.3 mg total) into a muscle once for 1 dose Do not start before February 16, 2023., Starting Thu 2/16/2023, Normal      famotidine (PEPCID) 40 MG tablet Take 80 mg by mouth daily, Starting Sat 9/18/2021, Historical Med      fluticasone (FLONASE) 50 mcg/act nasal spray 1 spray into each nostril daily, Historical Med      fluticasone-salmeterol (ADVAIR) 250-50 mcg/dose inhaler Inhale 1 puff every 12 (twelve) hours. , Historical Med      Hypertonic Nasal Wash (Sinus Rinse Refill) PACK Starting Thu 11/11/2021, Historical Med      loratadine (CLARITIN) 10 mg tablet Take 10 mg by mouth daily, Historical Med      montelukast (SINGULAIR) 10 mg tablet Take 10 mg by mouth daily at bedtime, Historical Med      omeprazole (PriLOSEC) 40 MG capsule Take 40 mg by mouth daily, Historical Med      ondansetron (ZOFRAN-ODT) 4 mg disintegrating tablet Take 1 tablet (4 mg total) by mouth every 8 (eight) hours as needed for nausea or vomiting, Starting Wed 7/5/2023, Normal      scopolamine (TRANSDERM-SCOP) 1 mg/3 days TD 72 hr patch Place 1 patch on the skin over 72 hours every third day, Starting Wed 7/5/2023, Normal      sildenafil (VIAGRA) 100 mg tablet Take 1 tablet (100 mg total) by mouth daily as needed for erectile dysfunction, Starting Mon 10/18/2021, Normal      Trelegy Ellipta 100-62.5-25 MCG/INH inhaler Inhale 1 puff daily  , Starting Fri 12/17/2021, Historical Med             No discharge procedures on file.     PDMP Review     None          ED Provider  Electronically Signed by           Drew Ford, KAREN  10/01/23 2110

## 2023-10-06 LAB
BACTERIA BLD CULT: NORMAL
BACTERIA BLD CULT: NORMAL

## 2024-05-08 ENCOUNTER — HOSPITAL ENCOUNTER (EMERGENCY)
Facility: HOSPITAL | Age: 59
Discharge: HOME | End: 2024-05-08
Attending: EMERGENCY MEDICINE
Payer: COMMERCIAL

## 2024-05-08 VITALS
TEMPERATURE: 98.1 F | SYSTOLIC BLOOD PRESSURE: 161 MMHG | DIASTOLIC BLOOD PRESSURE: 95 MMHG | RESPIRATION RATE: 20 BRPM | HEART RATE: 80 BPM | OXYGEN SATURATION: 96 %

## 2024-05-08 DIAGNOSIS — H10.9 CONJUNCTIVITIS, LEFT EYE: Primary | ICD-10-CM

## 2024-05-08 PROCEDURE — 99284 EMERGENCY DEPT VISIT MOD MDM: CPT

## 2024-05-08 PROCEDURE — 99282 EMERGENCY DEPT VISIT SF MDM: CPT

## 2024-05-08 RX ORDER — ERYTHROMYCIN 5 MG/G
0.5 OINTMENT OPHTHALMIC ONCE
Status: COMPLETED | OUTPATIENT
Start: 2024-05-08 | End: 2024-05-08

## 2024-05-08 RX ORDER — TETRACAINE HYDROCHLORIDE 5 MG/ML
1 SOLUTION OPHTHALMIC ONCE
Status: COMPLETED | OUTPATIENT
Start: 2024-05-08 | End: 2024-05-08

## 2024-05-08 RX ADMIN — TETRACAINE HYDROCHLORIDE 1 DROP: 5 SOLUTION OPHTHALMIC at 21:51

## 2024-05-08 RX ADMIN — ERYTHROMYCIN 0.5 INCH: 5 OINTMENT OPHTHALMIC at 22:06

## 2024-05-08 RX ADMIN — FLUORESCEIN SODIUM 1 STRIP: 1 STRIP OPHTHALMIC at 21:52

## 2024-05-08 NOTE — Clinical Note
Donato Adler was seen and treated in our emergency department on 5/8/2024.    Occupational Medicine Follow Up Within 24 hours            Diagnosis:     Donato  .    He may return on this date:          If you have any questions or concerns, please don't hesitate to call.      Herlinda Charles PA-C    ______________________________           _______________          _______________  Hospital Representative                              Date                                Time

## 2024-05-09 NOTE — ED PROVIDER NOTES
History  Chief Complaint   Patient presents with    Eye Pain     Patient states he started with L eye irritation and difficulty keeping open. Believes he has something in his eye.      Donato Adler is a 58 y.o. male with a PMH of skin cancer, allergies presenting to the ED on May 9, 2024 with a pain. 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  Due to this he had to call in a another  to finish his shift and his employer sent him here to the emergency department for evaluation.  Patient states that he has a foreign body sensation in his left eye that is unsure if he got anything into it.  He states that he currently has a sinus infection.  He denies any discharge from the eye, trauma to the eye, visual changes, fevers, chills or any other complaints at this time.         Eye Pain  Associated symptoms: no chest pain, no congestion, no cough, no ear pain, no fever, no headaches, no rash, no shortness of breath and no sore throat        Prior to Admission Medications   Prescriptions Last Dose Informant Patient Reported? Taking?   Azelastine HCl 137 MCG/SPRAY SOLN   Yes No   Sig: PLEASE SEE ATTACHED FOR DETAILED DIRECTIONS   EPINEPHrine (EPIPEN) 0.3 mg/0.3 mL SOAJ   No No   Sig: Inject 0.3 mL (0.3 mg total) into a muscle once for 1 dose Do not start before February 16, 2023.   Hypertonic Nasal Wash (Sinus Rinse Refill) PACK  Self Yes No   Trelegy Ellipta 100-62.5-25 MCG/INH inhaler  Self Yes No   Sig: Inhale 1 puff daily     albuterol (2.5 mg/3 mL) 0.083 % nebulizer solution  Self Yes No   Sig: Take 2.5 mg by nebulization every 6 (six) hours as needed for wheezing or shortness of breath   albuterol (PROVENTIL HFA,VENTOLIN HFA) 90 mcg/act inhaler  Self Yes No   Sig: Inhale 2 puffs every 6 (six) hours as needed for wheezing   budesonide (PULMICORT) 0.25 mg/2 mL nebulizer  "solution  Self Yes No   Sig: \"NOT FOR NEBULIZATION, FOR IRRIGATION ONLY-Mix 1 ampule in 8oz of NaCI, irrigate each nostril with 4oz, twice a day\"   cetirizine (ZyrTEC) 10 mg tablet  Self Yes No   Sig: Take 10 mg by mouth daily   diazepam (VALIUM) 5 mg tablet  Self No No   Sig: Take 1 tablet (5 mg total) by mouth every 12 (twelve) hours as needed for muscle spasms for up to 10 days   famotidine (PEPCID) 40 MG tablet  Self Yes No   Sig: Take 80 mg by mouth daily   fluticasone (FLONASE) 50 mcg/act nasal spray  Self Yes No   Si spray into each nostril daily   Patient not taking: Reported on 2023   fluticasone-salmeterol (ADVAIR) 250-50 mcg/dose inhaler  Self Yes No   Sig: Inhale 1 puff every 12 (twelve) hours.   Patient not taking: Reported on 2022   loratadine (CLARITIN) 10 mg tablet  Self Yes No   Sig: Take 10 mg by mouth daily   montelukast (SINGULAIR) 10 mg tablet  Self Yes No   Sig: Take 10 mg by mouth daily at bedtime   omeprazole (PriLOSEC) 40 MG capsule  Self Yes No   Sig: Take 40 mg by mouth daily   ondansetron (ZOFRAN-ODT) 4 mg disintegrating tablet   No No   Sig: Take 1 tablet (4 mg total) by mouth every 8 (eight) hours as needed for nausea or vomiting   scopolamine (TRANSDERM-SCOP) 1 mg/3 days TD 72 hr patch   No No   Sig: Place 1 patch on the skin over 72 hours every third day   sildenafil (VIAGRA) 100 mg tablet  Self No No   Sig: Take 1 tablet (100 mg total) by mouth daily as needed for erectile dysfunction      Facility-Administered Medications: None       Past Medical History:   Diagnosis Date    Allergic     Asthma     Cancer (HCC)     skin cancer    Chronic back pain     GERD (gastroesophageal reflux disease)     Pancreatitis     Pulmonary embolism (HCC)     Spinal cord injury        Past Surgical History:   Procedure Laterality Date    COLONOSCOPY      [facility]    SHOULDER SURGERY         Family History   Problem Relation Age of Onset    Heart disease Paternal Grandmother      I " have reviewed and agree with the history as documented.    E-Cigarette/Vaping    E-Cigarette Use Never User      E-Cigarette/Vaping Substances    Nicotine No     THC No     CBD No     Flavoring No     Other No     Unknown No      Social History     Tobacco Use    Smoking status: Never    Smokeless tobacco: Never   Vaping Use    Vaping status: Never Used   Substance Use Topics    Alcohol use: Not Currently     Comment: Occasional    Drug use: No       Review of Systems   Constitutional:  Negative for chills and fever.   HENT:  Positive for sinus pressure and sinus pain. Negative for congestion, ear pain, facial swelling and sore throat.    Eyes:  Positive for pain and redness. Negative for photophobia, discharge, itching and visual disturbance.   Respiratory:  Negative for cough and shortness of breath.    Cardiovascular:  Negative for chest pain and palpitations.   Skin:  Negative for color change and rash.   Neurological:  Negative for seizures, syncope and headaches.   All other systems reviewed and are negative.      Physical Exam  Physical Exam  Vitals and nursing note reviewed.   Constitutional:       General: He is not in acute distress.     Appearance: Normal appearance. He is normal weight. He is not ill-appearing, toxic-appearing or diaphoretic.   HENT:      Head: Normocephalic and atraumatic.      Right Ear: External ear normal.      Left Ear: External ear normal.      Nose: Nose normal. No congestion or rhinorrhea.      Mouth/Throat:      Mouth: Mucous membranes are moist.   Eyes:      General: Lids are normal. Lids are everted, no foreign bodies appreciated. Vision grossly intact. Gaze aligned appropriately. No allergic shiner or scleral icterus.        Right eye: No foreign body or discharge.         Left eye: Discharge present.No foreign body.      Intraocular pressure: Right eye pressure is 14 mmHg. Left eye pressure is 20 mmHg.      Extraocular Movements: Extraocular movements intact.      Right eye:  Normal extraocular motion and no nystagmus.      Left eye: Normal extraocular motion and no nystagmus.      Conjunctiva/sclera:      Right eye: Right conjunctiva is not injected. No chemosis, exudate or hemorrhage.     Left eye: Left conjunctiva is injected. No chemosis, exudate or hemorrhage.     Pupils: Pupils are equal, round, and reactive to light. Pupils are equal.      Right eye: No corneal abrasion or fluorescein uptake.      Left eye: No corneal abrasion or fluorescein uptake.   Cardiovascular:      Rate and Rhythm: Normal rate and regular rhythm.      Heart sounds: Normal heart sounds. No murmur heard.     No friction rub. No gallop.   Pulmonary:      Effort: Pulmonary effort is normal. No respiratory distress.      Breath sounds: Normal breath sounds. No wheezing, rhonchi or rales.   Abdominal:      General: Abdomen is flat.   Musculoskeletal:         General: No signs of injury. Normal range of motion.      Cervical back: Normal range of motion and neck supple. No rigidity.   Skin:     General: Skin is warm.      Capillary Refill: Capillary refill takes less than 2 seconds.      Coloration: Skin is not pale.      Findings: No erythema.   Neurological:      General: No focal deficit present.      Mental Status: He is alert and oriented to person, place, and time. Mental status is at baseline.      Motor: No weakness.      Gait: Gait normal.   Psychiatric:         Mood and Affect: Mood normal.         Behavior: Behavior normal.         Vital Signs  ED Triage Vitals [05/08/24 2036]   Temperature Pulse Respirations Blood Pressure SpO2   98.1 °F (36.7 °C) 80 20 161/95 96 %      Temp Source Heart Rate Source Patient Position - Orthostatic VS BP Location FiO2 (%)   Oral Monitor Sitting Right arm --      Pain Score       --           Vitals:    05/08/24 2036   BP: 161/95   Pulse: 80   Patient Position - Orthostatic VS: Sitting         Visual Acuity  Visual Acuity      Flowsheet Row Most Recent Value   Visual  acuity R eye is Other  [20/13]   Visual acuity Left eye is 20/40  [EYE THATS INJURED]   Visual acuity in both eyes is Other  [20/13]   Wearing corrective eyewear/lenses? No            ED Medications  Medications   tetracaine 0.5 % ophthalmic solution 1 drop (1 drop Right Eye Given by Other 5/8/24 2151)   fluorescein sodium sterile ophthalmic strip 1 strip (1 strip Left Eye Given by Other 5/8/24 2152)   erythromycin (ILOTYCIN) 0.5 % ophthalmic ointment 0.5 inch (0.5 inches Left Eye Given 5/8/24 2206)       Diagnostic Studies  Results Reviewed       None                   No orders to display              Procedures  Procedures         ED Course                                             Medical Decision Making  Patient seen and examined noted to have conjunctivitis of the left eye.  Patient is currently sick with a sinus infection.  He states that his symptoms began around noon today and had to leave work because of them.  Patient has a foreign body sensation to the left eye however eyelids were inverted and no foreign bodies were appreciated on my exam.  No abrasions or ulcers on fluorescein stain.  Green discharge present from left eye.  Normal IOP's.  Patient was given a dose of erythromycin ointment here and will complete the course at home.  Patient was referred to occupational medicine at the [facility] as he was sent here from work.  Strict return precautions were given which patient expresses understanding of.    Differential diagnosis includes but is not limited to conjunctivitis, corneal abrasion, corneal foreign body, iritis, uveitis, UV keratitis, periorbital cellulitis, orbital cellulitis, endophthalmitis, glaucoma, ruptured globe, vitreous hemorrhage, vitreous detachment, retinal detachment, episcleritis, scleritis, hyphema, subconjunctival hemorrhage.       Patient appears well, is nontoxic appearing, he expresses understanding and agrees with plan of care at this time.  In light of this  "patient would benefit from outpatient management.    Patient at time of discharge well-appearing in no acute distress, all questions answered. Patient agreeable to plan.  Patient's vitals, lab/imaging results, diagnosis, and treatment plan were discussed with the patient. All new/changed medications were discussed with patient, specifically, route of administration, how often and when to take, and where they can be picked up. Strict return precautions as well as close follow up with PCP was discussed with the patient and the patient was agreeable to my recommendations. Patient verbally acknowledged understanding of the above communications. Strict return precautions were provided. All labs reviewed and utilized in the medical decision making process (if labs were ordered). Portions of the record may have been created with voice recognition software.  Occasional wrong word or \"sound a like\" substitutions may have occurred due to the inherent limitations of voice recognition software.  Read the chart carefully and recognize, using context, where substitutions have occurred.      Risk  Prescription drug management.             Disposition  Final diagnoses:   Conjunctivitis, left eye     Time reflects when diagnosis was documented in both MDM as applicable and the Disposition within this note       Time User Action Codes Description Comment    5/8/2024 10:07 PM Herlinda Charles Add [H10.9] Conjunctivitis, left eye           ED Disposition       ED Disposition   Discharge    Condition   Stable    Date/Time   Wed May 8, 2024 10:06 PM    Comment   Donato Adler discharge to home/self care.                   Follow-up Information       Follow up With Specialties Details Why Contact Info Additional Information    Barrera Yancey DO Family Medicine   [address] [place] [address] [phone]       Critical access hospital Emergency Department Emergency Medicine  If symptoms worsen, As needed [address] " "[address]  [facility] [address]  [phone] [facility] Emergency Department, [address] [facility], [place], [place], [address]    [facility] In 1 day For occupational health [address]  [facility] [address]  [phone] [facility], [address], [place], [place], [address]   [phone]            Discharge Medication List as of 5/8/2024 10:09 PM        CONTINUE these medications which have NOT CHANGED    Details   albuterol (2.5 mg/3 mL) 0.083 % nebulizer solution Take 2.5 mg by nebulization every 6 (six) hours as needed for wheezing or shortness of breath, Historical Med      albuterol (PROVENTIL HFA,VENTOLIN HFA) 90 mcg/act inhaler Inhale 2 puffs every 6 (six) hours as needed for wheezing, Historical Med      Azelastine HCl 137 MCG/SPRAY SOLN PLEASE SEE ATTACHED FOR DETAILED DIRECTIONS, Historical Med      budesonide (PULMICORT) 0.25 mg/2 mL nebulizer solution \"NOT FOR NEBULIZATION, FOR IRRIGATION ONLY-Mix 1 ampule in 8oz of NaCI, irrigate each nostril with 4oz, twice a day\", Historical Med      cetirizine (ZyrTEC) 10 mg tablet Take 10 mg by mouth daily, Historical Med      diazepam (VALIUM) 5 mg tablet Take 1 tablet (5 mg total) by mouth every 12 (twelve) hours as needed for muscle spasms for up to 10 days, Starting Mon 10/18/2021, Until Wed 7/5/2023 at 2359, Normal      EPINEPHrine (EPIPEN) 0.3 mg/0.3 mL SOAJ Inject 0.3 mL (0.3 mg total) into a muscle once for 1 dose Do not start before February 16, 2023., Starting Thu 2/16/2023, Normal      famotidine (PEPCID) 40 MG tablet Take 80 mg by mouth daily, Starting Sat 9/18/2021, Historical Med      fluticasone (FLONASE) 50 mcg/act nasal spray 1 spray into each nostril daily, Historical Med      fluticasone-salmeterol (ADVAIR) 250-50 mcg/dose inhaler Inhale 1 puff every 12 (twelve) hours., Historical Med      Hypertonic Nasal Wash (Sinus Rinse Refill) PACK Starting " Thu 11/11/2021, Historical Med      loratadine (CLARITIN) 10 mg tablet Take 10 mg by mouth daily, Historical Med      montelukast (SINGULAIR) 10 mg tablet Take 10 mg by mouth daily at bedtime, Historical Med      omeprazole (PriLOSEC) 40 MG capsule Take 40 mg by mouth daily, Historical Med      ondansetron (ZOFRAN-ODT) 4 mg disintegrating tablet Take 1 tablet (4 mg total) by mouth every 8 (eight) hours as needed for nausea or vomiting, Starting Wed 7/5/2023, Normal      scopolamine (TRANSDERM-SCOP) 1 mg/3 days TD 72 hr patch Place 1 patch on the skin over 72 hours every third day, Starting Wed 7/5/2023, Normal      sildenafil (VIAGRA) 100 mg tablet Take 1 tablet (100 mg total) by mouth daily as needed for erectile dysfunction, Starting Mon 10/18/2021, Normal      Trelegy Ellipta 100-62.5-25 MCG/INH inhaler Inhale 1 puff daily  , Starting Fri 12/17/2021, Historical Med             No discharge procedures on file.    PDMP Review       None            ED Provider  Electronically Signed by             Herlinda Charles PA-C  05/09/24 0767

## 2024-05-16 ENCOUNTER — APPOINTMENT (EMERGENCY)
Dept: RADIOLOGY | Facility: HOSPITAL | Age: 59
End: 2024-05-16
Payer: COMMERCIAL

## 2024-05-16 ENCOUNTER — HOSPITAL ENCOUNTER (EMERGENCY)
Facility: HOSPITAL | Age: 59
Discharge: HOME | End: 2024-05-16
Attending: STUDENT IN AN ORGANIZED HEALTH CARE EDUCATION/TRAINING PROGRAM
Payer: COMMERCIAL

## 2024-05-16 VITALS
OXYGEN SATURATION: 95 % | HEART RATE: 102 BPM | RESPIRATION RATE: 24 BRPM | DIASTOLIC BLOOD PRESSURE: 73 MMHG | TEMPERATURE: 98.1 F | SYSTOLIC BLOOD PRESSURE: 136 MMHG

## 2024-05-16 DIAGNOSIS — T78.40XA ACUTE ALLERGIC REACTION, INITIAL ENCOUNTER: Primary | ICD-10-CM

## 2024-05-16 DIAGNOSIS — R06.00 DYSPNEA: ICD-10-CM

## 2024-05-16 DIAGNOSIS — I10 HYPERTENSION: ICD-10-CM

## 2024-05-16 LAB
ALBUMIN SERPL BCP-MCNC: 3.9 G/DL (ref 3.5–5)
ALP SERPL-CCNC: 73 U/L (ref 34–104)
ALT SERPL W P-5'-P-CCNC: 29 U/L (ref 7–52)
ANION GAP SERPL CALCULATED.3IONS-SCNC: 7 MMOL/L (ref 4–13)
AST SERPL W P-5'-P-CCNC: 20 U/L (ref 13–39)
BASOPHILS # BLD AUTO: 0.04 THOUSANDS/ÂΜL (ref 0–0.1)
BASOPHILS NFR BLD AUTO: 0 % (ref 0–1)
BILIRUB SERPL-MCNC: 0.44 MG/DL (ref 0.2–1)
BUN SERPL-MCNC: 17 MG/DL (ref 5–25)
CALCIUM SERPL-MCNC: 8.9 MG/DL (ref 8.4–10.2)
CARDIAC TROPONIN I PNL SERPL HS: 4 NG/L
CHLORIDE SERPL-SCNC: 103 MMOL/L (ref 96–108)
CO2 SERPL-SCNC: 26 MMOL/L (ref 21–32)
CREAT SERPL-MCNC: 1.12 MG/DL (ref 0.6–1.3)
D DIMER PPP FEU-MCNC: 0.5 UG/ML FEU
EOSINOPHIL # BLD AUTO: 0.21 THOUSAND/ÂΜL (ref 0–0.61)
EOSINOPHIL NFR BLD AUTO: 2 % (ref 0–6)
ERYTHROCYTE [DISTWIDTH] IN BLOOD BY AUTOMATED COUNT: 14.6 % (ref 11.6–15.1)
FLUAV RNA RESP QL NAA+PROBE: NEGATIVE
FLUBV RNA RESP QL NAA+PROBE: NEGATIVE
GFR SERPL CREATININE-BSD FRML MDRD: 71 ML/MIN/1.73SQ M
GLUCOSE SERPL-MCNC: 134 MG/DL (ref 65–140)
HCT VFR BLD AUTO: 47.8 % (ref 36.5–49.3)
HGB BLD-MCNC: 15.4 G/DL (ref 12–17)
IMM GRANULOCYTES # BLD AUTO: 0.03 THOUSAND/UL (ref 0–0.2)
IMM GRANULOCYTES NFR BLD AUTO: 0 % (ref 0–2)
LYMPHOCYTES # BLD AUTO: 3.25 THOUSANDS/ÂΜL (ref 0.6–4.47)
LYMPHOCYTES NFR BLD AUTO: 35 % (ref 14–44)
MCH RBC QN AUTO: 28.3 PG (ref 26.8–34.3)
MCHC RBC AUTO-ENTMCNC: 32.2 G/DL (ref 31.4–37.4)
MCV RBC AUTO: 88 FL (ref 82–98)
MONOCYTES # BLD AUTO: 0.66 THOUSAND/ÂΜL (ref 0.17–1.22)
MONOCYTES NFR BLD AUTO: 7 % (ref 4–12)
NEUTROPHILS # BLD AUTO: 5.11 THOUSANDS/ÂΜL (ref 1.85–7.62)
NEUTS SEG NFR BLD AUTO: 56 % (ref 43–75)
NRBC BLD AUTO-RTO: 0 /100 WBCS
PLATELET # BLD AUTO: 318 THOUSANDS/UL (ref 149–390)
PMV BLD AUTO: 8.5 FL (ref 8.9–12.7)
POTASSIUM SERPL-SCNC: 3.6 MMOL/L (ref 3.5–5.3)
PROT SERPL-MCNC: 6.9 G/DL (ref 6.4–8.4)
RBC # BLD AUTO: 5.45 MILLION/UL (ref 3.88–5.62)
RSV RNA RESP QL NAA+PROBE: NEGATIVE
SARS-COV-2 RNA RESP QL NAA+PROBE: NEGATIVE
SODIUM SERPL-SCNC: 136 MMOL/L (ref 135–147)
WBC # BLD AUTO: 9.3 THOUSAND/UL (ref 4.31–10.16)

## 2024-05-16 PROCEDURE — 99284 EMERGENCY DEPT VISIT MOD MDM: CPT

## 2024-05-16 PROCEDURE — 85025 COMPLETE CBC W/AUTO DIFF WBC: CPT

## 2024-05-16 PROCEDURE — 99285 EMERGENCY DEPT VISIT HI MDM: CPT | Performed by: STUDENT IN AN ORGANIZED HEALTH CARE EDUCATION/TRAINING PROGRAM

## 2024-05-16 PROCEDURE — 85379 FIBRIN DEGRADATION QUANT: CPT

## 2024-05-16 PROCEDURE — 80053 COMPREHEN METABOLIC PANEL: CPT

## 2024-05-16 PROCEDURE — 96374 THER/PROPH/DIAG INJ IV PUSH: CPT

## 2024-05-16 PROCEDURE — 96375 TX/PRO/DX INJ NEW DRUG ADDON: CPT

## 2024-05-16 PROCEDURE — 94640 AIRWAY INHALATION TREATMENT: CPT

## 2024-05-16 PROCEDURE — 84484 ASSAY OF TROPONIN QUANT: CPT

## 2024-05-16 PROCEDURE — 36415 COLL VENOUS BLD VENIPUNCTURE: CPT

## 2024-05-16 PROCEDURE — 71045 X-RAY EXAM CHEST 1 VIEW: CPT

## 2024-05-16 PROCEDURE — 0241U HB NFCT DS VIR RESP RNA 4 TRGT: CPT

## 2024-05-16 PROCEDURE — 93005 ELECTROCARDIOGRAM TRACING: CPT

## 2024-05-16 RX ORDER — ALBUTEROL SULFATE 2.5 MG/3ML
10 SOLUTION RESPIRATORY (INHALATION) ONCE
Status: COMPLETED | OUTPATIENT
Start: 2024-05-16 | End: 2024-05-16

## 2024-05-16 RX ORDER — LORATADINE 10 MG/1
10 TABLET ORAL ONCE
Status: COMPLETED | OUTPATIENT
Start: 2024-05-16 | End: 2024-05-16

## 2024-05-16 RX ORDER — EPINEPHRINE 0.3 MG/.3ML
0.3 INJECTION SUBCUTANEOUS ONCE
Qty: 0.6 ML | Refills: 0 | Status: SHIPPED | OUTPATIENT
Start: 2024-05-16 | End: 2024-05-16

## 2024-05-16 RX ORDER — ALBUTEROL SULFATE 2.5 MG/3ML
1 SOLUTION RESPIRATORY (INHALATION) ONCE
Status: COMPLETED | OUTPATIENT
Start: 2024-05-16 | End: 2024-05-16

## 2024-05-16 RX ORDER — FAMOTIDINE 10 MG/ML
20 INJECTION, SOLUTION INTRAVENOUS ONCE
Status: COMPLETED | OUTPATIENT
Start: 2024-05-16 | End: 2024-05-16

## 2024-05-16 RX ORDER — IPRATROPIUM BROMIDE AND ALBUTEROL SULFATE .5; 3 MG/3ML; MG/3ML
1 SOLUTION RESPIRATORY (INHALATION) ONCE
Status: COMPLETED | OUTPATIENT
Start: 2024-05-16 | End: 2024-05-16

## 2024-05-16 RX ORDER — METHYLPREDNISOLONE SODIUM SUCCINATE 125 MG/2ML
125 INJECTION, POWDER, LYOPHILIZED, FOR SOLUTION INTRAMUSCULAR; INTRAVENOUS ONCE
Status: COMPLETED | OUTPATIENT
Start: 2024-05-16 | End: 2024-05-16

## 2024-05-16 RX ORDER — PREDNISONE 20 MG/1
40 TABLET ORAL DAILY
Qty: 10 TABLET | Refills: 0 | Status: SHIPPED | OUTPATIENT
Start: 2024-05-16 | End: 2024-05-21

## 2024-05-16 RX ADMIN — ALBUTEROL SULFATE 10 MG: 2.5 SOLUTION RESPIRATORY (INHALATION) at 14:41

## 2024-05-16 RX ADMIN — FAMOTIDINE 20 MG: 10 INJECTION INTRAVENOUS at 14:23

## 2024-05-16 RX ADMIN — LORATADINE 10 MG: 10 TABLET ORAL at 14:23

## 2024-05-16 RX ADMIN — IPRATROPIUM BROMIDE 0.5 MG: 0.5 SOLUTION RESPIRATORY (INHALATION) at 14:41

## 2024-05-16 RX ADMIN — METHYLPREDNISOLONE SODIUM SUCCINATE 125 MG: 125 INJECTION, POWDER, FOR SOLUTION INTRAMUSCULAR; INTRAVENOUS at 14:24

## 2024-05-16 NOTE — ED PROVIDER NOTES
"History  Chief Complaint   Patient presents with    Allergic Reaction     Pt arrives via EMS from work after being exposed to a pine air freshener. Pt took 4 puffs of albuterol inhaler and epi pen with relief. Per EMS pt became \"tight\" again and gave 1 duoneb and 1 albuterol. Pt reports being allergic to perfume, body wash, etc     58-year-old male presents with allergic reaction.  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  Today he went into a van which was enclosed with an air freshener which triggered his allergies.  Patient had immediate shortness of breath for which she took 4 puffs of albuterol without relief and then administered IM EpiPen.  Upon evaluation by EMS was noted to be ambulatory and then shortly afterwards short of breath again.  EMS noted that lung sounds were clear bilaterally and gave DuoNeb treatment with resolution of symptoms.  Patient states that this is his typical and usually has allergic reaction and shortness of breath after exposures to perfumes, detergent, dandruff, dust, soap, aftershave, hair conditioner.  Denies previous hospitalization, intubation, ICU admission for shortness of breath/allergic reaction.  Denies alcohol use, tobacco use, recreational drug use.  Denies recent changes in medications.  Denies fevers, chills, chest pain, exertional dyspnea, leg swelling, nausea, vomiting, diaphoresis, abdominal pain, changes in urinary or bowel habits.  Previous history of pulmonary embolism during 911, currently not blood thinners.      Allergic Reaction      None       No past medical history on file.    No past surgical history on file.    No family history on file.  I have reviewed and agree with the history as documented.    No existing history information found.  No existing history information found.        Review of Systems   All other systems reviewed and are negative.      Physical Exam  ED Triage Vitals [05/16/24 " 1356]   Temperature Pulse Respirations Blood Pressure SpO2   98.1 °F (36.7 °C) 81 (!) 24 (!) 190/105 97 %      Temp src Heart Rate Source Patient Position - Orthostatic VS BP Location FiO2 (%)   -- Monitor Sitting Left arm --      Pain Score       --             Orthostatic Vital Signs  Vitals:    05/16/24 1403 05/16/24 1530 05/16/24 1600 05/16/24 1630   BP: 151/69 136/72 138/70 136/73   Pulse:  99 85 102   Patient Position - Orthostatic VS:           Physical Exam  Vitals and nursing note reviewed.   Constitutional:       General: He is not in acute distress.     Appearance: Normal appearance. He is normal weight. He is not ill-appearing, toxic-appearing or diaphoretic.   HENT:      Head: Normocephalic and atraumatic.      Right Ear: External ear normal.      Left Ear: External ear normal.      Nose: Nose normal.      Mouth/Throat:      Mouth: Mucous membranes are moist.      Pharynx: Oropharynx is clear.   Eyes:      General: No scleral icterus.        Right eye: No discharge.         Left eye: No discharge.      Extraocular Movements: Extraocular movements intact.      Conjunctiva/sclera: Conjunctivae normal.   Cardiovascular:      Rate and Rhythm: Normal rate and regular rhythm.      Pulses: Normal pulses.      Heart sounds: Normal heart sounds. No murmur heard.  Pulmonary:      Effort: Pulmonary effort is normal. No respiratory distress.      Breath sounds: Normal breath sounds. No stridor. No wheezing, rhonchi or rales.   Chest:      Chest wall: No tenderness.   Abdominal:      General: There is no distension.      Palpations: Abdomen is soft. There is no mass.      Tenderness: There is no abdominal tenderness. There is no guarding or rebound.      Hernia: No hernia is present.   Musculoskeletal:         General: No swelling, tenderness, deformity or signs of injury. Normal range of motion.      Cervical back: Normal range of motion and neck supple. No rigidity or tenderness.      Right lower leg: No edema.       Left lower leg: No edema.   Skin:     General: Skin is warm and dry.      Capillary Refill: Capillary refill takes less than 2 seconds.      Coloration: Skin is not cyanotic, jaundiced or pale.      Findings: No bruising, erythema, lesion, petechiae or rash.   Neurological:      General: No focal deficit present.      Mental Status: He is alert and oriented to person, place, and time. Mental status is at baseline.   Psychiatric:         Mood and Affect: Mood normal.         Behavior: Behavior normal.         ED Medications  Medications   albuterol (FOR EMS ONLY) (2.5 mg/3 mL) 0.083 % inhalation solution 2.5 mg (0 mg Does not apply Given to EMS 5/16/24 1358)   ipratropium-albuterol (FOR EMS ONLY) (DUO-NEB) 0.5-2.5 mg/3 mL inhalation solution 3 mL (0 mL Does not apply Given to EMS 5/16/24 1358)   loratadine (CLARITIN) tablet 10 mg (10 mg Oral Given 5/16/24 1423)   Famotidine (PF) (PEPCID) injection 20 mg (20 mg Intravenous Given 5/16/24 1423)   methylPREDNISolone sodium succinate (Solu-MEDROL) injection 125 mg (125 mg Intravenous Given 5/16/24 1424)   albuterol inhalation solution 10 mg (10 mg Nebulization Given 5/16/24 1441)   ipratropium (ATROVENT) 0.02 % inhalation solution 0.5 mg (0.5 mg Nebulization Given 5/16/24 1441)       Diagnostic Studies  Results Reviewed       Procedure Component Value Units Date/Time    FLU/RSV/COVID - if FLU/RSV clinically relevant [[id number]]  (Normal) Collected: 05/16/24 1410    Lab Status: Final result Specimen: Nares from Nose Updated: 05/16/24 1456     SARS-CoV-2 Negative     INFLUENZA A PCR Negative     INFLUENZA B PCR Negative     RSV PCR Negative    Narrative:      FOR PEDIATRIC PATIENTS - copy/paste COVID Guidelines URL to browser: [email or web address]    SARS-CoV-2 assay is a Nucleic Acid Amplification assay intended for the  qualitative detection of nucleic acid from SARS-CoV-2 in nasopharyngeal  swabs. Results are for  the presumptive identification of SARS-CoV-2 RNA.    Positive results are indicative of infection with SARS-CoV-2, the virus  causing COVID-19, but do not rule out bacterial infection or co-infection  with other viruses. Laboratories within the [place] and its  territories are required to report all positive results to the appropriate  public health authorities. Negative results do not preclude SARS-CoV-2  infection and should not be used as the sole basis for treatment or other  patient management decisions. Negative results must be combined with  clinical observations, patient history, and epidemiological information.  This test has not been FDA cleared or approved.    This test has been authorized by FDA under an Emergency Use Authorization  (EUA). This test is only authorized for the duration of time the  declaration that circumstances exist justifying the authorization of the  emergency use of an in vitro diagnostic tests for detection of SARS-CoV-2  virus and/or diagnosis of COVID-19 infection under section 564(b)(1) of  the Act, 21 U.S.C. 360bbb-3(b)(1), unless the authorization is terminated  or revoked sooner. The test has been validated but independent review by FDA  and CLIA is pending.    Test performed using Cepheid GeneXpert: This RT-PCR assay targets N2,  a region unique to SARS-CoV-2. A conserved region in the E-gene was chosen  for pan-Sarbecovirus detection which includes SARS-CoV-2.    According to CMS-2020-01-R, this platform meets the definition of high-throughput technology.    HS Troponin 0hr (reflex protocol) [[id number]]  (Normal) Collected: 05/16/24 1410    Lab Status: Final result Specimen: Blood from Arm, Right Updated: 05/16/24 1442     hs TnI 0hr 4 ng/L     D-Dimer [[id number]]  (Abnormal) Collected: 05/16/24 1410    Lab Status: Final result Specimen: Blood from Arm, Right Updated: 05/16/24 1437     D-Dimer, Quant 0.50 ug/ml FEU     Narrative:      In the evaluation for possible  pulmonary embolism, in the appropriate (Well's Score of 4 or less) patient, the age adjusted d-dimer cutoff for this patient can be calculated as:    Age x 0.01 (in ug/mL) for Age-adjusted D-dimer exclusion threshold for a patient over 50 years.    Comprehensive metabolic panel [[id number]] Collected: 05/16/24 1410    Lab Status: Final result Specimen: Blood from Arm, Right Updated: 05/16/24 1435     Sodium 136 mmol/L      Potassium 3.6 mmol/L      Chloride 103 mmol/L      CO2 26 mmol/L      ANION GAP 7 mmol/L      BUN 17 mg/dL      Creatinine 1.12 mg/dL      Glucose 134 mg/dL      Calcium 8.9 mg/dL      AST 20 U/L      ALT 29 U/L      Alkaline Phosphatase 73 U/L      Total Protein 6.9 g/dL      Albumin 3.9 g/dL      Total Bilirubin 0.44 mg/dL      eGFR 71 ml/min/1.73sq m     Narrative:      National Kidney Disease Foundation guidelines for Chronic Kidney Disease (CKD):     Stage 1 with normal or high GFR (GFR > 90 mL/min/1.73 square meters)    Stage 2 Mild CKD (GFR = 60-89 mL/min/1.73 square meters)    Stage 3A Moderate CKD (GFR = 45-59 mL/min/1.73 square meters)    Stage 3B Moderate CKD (GFR = 30-44 mL/min/1.73 square meters)    Stage 4 Severe CKD (GFR = 15-29 mL/min/1.73 square meters)    Stage 5 End Stage CKD (GFR <15 mL/min/1.73 square meters)  Note: GFR calculation is accurate only with a steady state creatinine    CBC and differential [[id number]]  (Abnormal) Collected: 05/16/24 1410    Lab Status: Final result Specimen: Blood from Arm, Right Updated: 05/16/24 1419     WBC 9.30 Thousand/uL      RBC 5.45 Million/uL      Hemoglobin 15.4 g/dL      Hematocrit 47.8 %      MCV 88 fL      MCH 28.3 pg      MCHC 32.2 g/dL      RDW 14.6 %      MPV 8.5 fL      Platelets 318 Thousands/uL      nRBC 0 /100 WBCs      Segmented % 56 %      Immature Grans % 0 %      Lymphocytes % 35 %      Monocytes % 7 %      Eosinophils Relative 2 %      Basophils Relative 0 %      Absolute Neutrophils 5.11 Thousands/µL      Absolute Immature  Grans 0.03 Thousand/uL      Absolute Lymphocytes 3.25 Thousands/µL      Absolute Monocytes 0.66 Thousand/µL      Eosinophils Absolute 0.21 Thousand/µL      Basophils Absolute 0.04 Thousands/µL                    XR chest 1 view portable   ED Interpretation by Corby Sales MD (05/16 1511)   No acute cardiopulmonary process noted.            Procedures  Procedures      ED Course  ED Course as of 05/16/24 1644   Thu May 16, 2024   1408 Doubtful ACS at this time, troponin to check for heart strain secondary to possible PE.  D-dimer added.   1415 EKG normal sinus rhythm rate of 85, normal TN interval, normal QRS interval, QTc 433, normal axis, no ST elevations, no ST depressions, no ischemic changes or STEMI.   1426 Nursing staff notified that patient having coughing fit. Patient noted to be in tripod position coughing w/ diffuse expiratory wheezing. Duoneb started.    1429 Patient asked to have his pulmonologist Dr. CHANTEL Pradhan called regarding current ED visit. # [phone]   1446 D-Dimer, Quant(!): 0.50  Age-adjusted D-dimer normal.    1441 Spoke w/ Dr. CHANTEL Pradhan who stated multiple episodes of asthma with exacerbation, and recommends standard treatment at this time.  He also advised that he had previous sinus surgery in October of 2023 for congested nasal breathing.  Has been prescribed nebulizer therapies, albuterol MDI, Pulmicort, Singulair, Claritin, Prilosec for GERD.  Has EpiPen for emergencies.  Previous courses of prednisone.  Last PFT 2 months ago with FEV1 of 71% and FEV1 over FVC of 72% without obstructive nature noted.   1628 Patient sitting in bed in no acute distress.  Lung sounds clear bilaterally.  Patient states feeling improved and states that he feels safe to go home at this time.             HEART Risk Score      Flowsheet Row Most Recent Value   Heart Score Risk Calculator    History 0 Filed at: 05/16/2024 1539   ECG 0 Filed at: 05/16/2024 1539   Age 1 Filed at: 05/16/2024 1539    Risk Factors 2 Filed at: 05/16/2024 1539   Troponin 0 Filed at: 05/16/2024 1539   HEART Score 3 Filed at: 05/16/2024 1539                            Wells' Criteria for PE      Flowsheet Row Most Recent Value   Wells' Criteria for PE    Clinical signs and symptoms of DVT 0 Filed at: 05/16/2024 1414   PE is primary diagnosis or equally likely 3 Filed at: 05/16/2024 1414   HR >100 0 Filed at: 05/16/2024 1414   Immobilization at least 3 days or Surgery in the previous 4 weeks 0 Filed at: 05/16/2024 1414   Previous, objectively diagnosed PE or DVT 1.5 Filed at: 05/16/2024 1414   Hemoptysis 0 Filed at: 05/16/2024 1414   Malignancy with treatment within 6 months or palliative 0 Filed at: 05/16/2024 1414   Johnson' Criteria Total 4.5 Filed at: 05/16/2024 1414              Medical Decision Making  58-year-old male presents with acute onset of shortness of breath.  Previous history of lung disease after exposure during 911.  Previous history of allergies.  Took albuterol and EpiPen prior to arrival.  Previous history of PE during 911, currently not on anticoagulation.  Differential includes but not limited to allergic reaction, pneumonitis, asthma exacerbation, acute COPD exacerbation, pneumothorax, PE, ACS  Less likely ACS at this time but will get initial troponin to look for heart strain secondary to possible PE.    D-dimer age-adjusted.  Labs within normal limits.  No acute findings on CXR.  Patient feeling improved after DuoNeb with no repeat occurrence of bronchospasm or shortness of breath.  Discussed findings with patient.  Patient states that he feels safe going home and being discharged and is currently at baseline.    Patient resting in bed, no acute distress, lung sounds clear bilaterally.  Course of prednisone and refill for EpiPen given.  Follow-up with PCP as needed.  Strict return precautions given for continued or worsening symptoms.  Patient understanding and agreement with plan.      Amount and/or  Complexity of Data Reviewed  Labs: ordered. Decision-making details documented in ED Course.  Radiology: ordered and independent interpretation performed.    Risk  OTC drugs.  Prescription drug management.          Disposition  Final diagnoses:   Hypertension   Acute allergic reaction, initial encounter   Dyspnea     Time reflects when diagnosis was documented in both MDM as applicable and the Disposition within this note       Time User Action Codes Description Comment    5/16/2024  2:15 PM Bard, Aerin J Add [I10] Hypertension     5/16/2024  2:15 PM Bard, Aerin J Add [T78.40XA] Acute allergic reaction, initial encounter     5/16/2024  2:15 PM Bard, Aerin J Modify [I10] Hypertension     5/16/2024  2:15 PM Bard, Aerin J Modify [T78.40XA] Acute allergic reaction, initial encounter     5/16/2024  2:15 PM Bard, Aerin J Add [R06.00] Dyspnea           ED Disposition       ED Disposition   Discharge    Condition   Stable    Date/Time   Thu May 16, 2024 1631    Comment   Rosendo Bolton discharge to home/self care.                   Follow-up Information       Follow up With Specialties Details Why Contact Info Additional Information    [facility] Internal Medicine Schedule an appointment as soon as possible for a visit  hypertension, allergies [address] [facility] [id number] [phone] [facility] [place], [address] [place], [address]   [phone]    [facility] Emergency Department Emergency Medicine Go to  If symptoms worsen [facility] [facility] [address]  [phone] [facility] Emergency Department, [facility] [place], [place], [address]            Discharge Medication List as of 5/16/2024  4:31 PM        START taking these medications    Details   EPINEPHrine (EPIPEN) 0.3 mg/0.3 mL SOAJ Inject 0.3 mL (0.3 mg total) into a muscle once for 1 dose, Starting Thu 5/16/2024,  Print      predniSONE 20 mg tablet Take 2 tablets (40 mg total) by mouth daily for 5 days, Starting Thu 5/16/2024, Until Tue 5/21/2024, Print           No discharge procedures on file.    PDMP Review       None             ED Provider  Attending physically available and evaluated Rosendo Bolton. I managed the patient along with the ED Attending.    Electronically Signed by           Corby Sales MD  05/16/24 [id number]

## 2024-05-16 NOTE — ED ATTENDING ATTESTATION
5/16/2024  I, Kevin Schilling DO, saw and evaluated the patient. I have discussed the patient with the resident/non-physician practitioner and agree with the resident's/non-physician practitioner's findings, Plan of Care, and MDM as documented in the resident's/non-physician practitioner's note, except where noted. All available labs and Radiology studies were reviewed.  I was present for key portions of any procedure(s) performed by the resident/non-physician practitioner and I was immediately available to provide assistance.       At this point I agree with the current assessment done in the Emergency Department.  I have conducted an independent evaluation of this patient a history and physical is as follows:    58-year-old male presents to the ED for evaluation of shortness of breath.  Has significant respiratory issues and hypersensitivities.  Earlier today was exposed to scents/chemicals which triggered shortness of breath and wheezing.  Took albuterol inhaler without relief, after which he administered his EpiPen.  Reports previous episodes of similar symptoms.  Denies prior need for hospitalization or intubation for this.  On my exam is receiving nebulizer treatment.  Has already received nebulizer by EMS.  Speaking in full sentences with no respiratory difficulty, normal heart sounds, normal lung sounds, moving all extremities and no gross motor deficit.  No rash present.  Is intermittently coughing.  Labs show no leukocytosis, stable hemoglobin, no acute electrode abnormalities, normal renal function, no acute LFT abnormalities, negative age-adjusted D-dimer, negative COVID/flu/RSV, initial troponin of 4.  Patient does not have any complaints of chest pain.  EKG shows no acute cardiopulmonary pathology as interpreted by myself.  Chest x-ray shows no acute cardiopulmonary pathology as interpreted by myself pending final radiology read.  Patient was given multiple medications in an attempt for symptomatic  relief.  Resident discussed results with patient.  Plan will be for discharge with close outpatient follow-up.  Stable for discharge and agreeable to the plan.  Strict return ED precautions given    ED Course         Critical Care Time  Procedures

## 2024-05-16 NOTE — Clinical Note
Rosendo Bolton was seen and treated in our emergency department on 5/16/2024.                Diagnosis:     Rosendo  .    He may return on this date: 05/18/2024         If you have any questions or concerns, please don't hesitate to call.      Corby Sales MD    ______________________________           _______________          _______________  Hospital Representative                              Date                                Time

## 2024-05-17 LAB
ATRIAL RATE: 85 BPM
P AXIS: 64 DEGREES
PR INTERVAL: 152 MS
QRS AXIS: 26 DEGREES
QRSD INTERVAL: 90 MS
QT INTERVAL: 364 MS
QTC INTERVAL: 433 MS
T WAVE AXIS: 61 DEGREES
VENTRICULAR RATE: 85 BPM

## 2024-05-17 PROCEDURE — 93010 ELECTROCARDIOGRAM REPORT: CPT | Performed by: INTERNAL MEDICINE

## 2024-07-14 ENCOUNTER — APPOINTMENT (EMERGENCY)
Dept: CT IMAGING | Facility: HOSPITAL | Age: 59
End: 2024-07-14
Payer: COMMERCIAL

## 2024-07-14 ENCOUNTER — HOSPITAL ENCOUNTER (OUTPATIENT)
Facility: HOSPITAL | Age: 59
Setting detail: OBSERVATION
Discharge: HOME | End: 2024-07-15
Attending: EMERGENCY MEDICINE | Admitting: HOSPITALIST
Payer: COMMERCIAL

## 2024-07-14 DIAGNOSIS — K85.90 PANCREATITIS: Primary | ICD-10-CM

## 2024-07-14 PROBLEM — J45.20 MILD INTERMITTENT ASTHMA: Status: ACTIVE | Noted: 2024-07-14

## 2024-07-14 PROBLEM — Z87.892 HISTORY OF ANAPHYLAXIS: Status: ACTIVE | Noted: 2024-07-14

## 2024-07-14 PROBLEM — K21.9 GERD (GASTROESOPHAGEAL REFLUX DISEASE): Status: ACTIVE | Noted: 2024-07-14

## 2024-07-14 PROBLEM — R03.0 ELEVATED BLOOD PRESSURE READING: Status: ACTIVE | Noted: 2024-07-14

## 2024-07-14 LAB
ALBUMIN SERPL BCG-MCNC: 4.3 G/DL (ref 3.5–5)
ALP SERPL-CCNC: 66 U/L (ref 34–104)
ALT SERPL W P-5'-P-CCNC: 27 U/L (ref 7–52)
ANION GAP SERPL CALCULATED.3IONS-SCNC: 5 MMOL/L (ref 4–13)
AST SERPL W P-5'-P-CCNC: 18 U/L (ref 13–39)
BASOPHILS # BLD AUTO: 0.05 THOUSANDS/ÂΜL (ref 0–0.1)
BASOPHILS NFR BLD AUTO: 1 % (ref 0–1)
BILIRUB SERPL-MCNC: 0.65 MG/DL (ref 0.2–1)
BUN SERPL-MCNC: 11 MG/DL (ref 5–25)
CALCIUM SERPL-MCNC: 9.4 MG/DL (ref 8.4–10.2)
CHLORIDE SERPL-SCNC: 102 MMOL/L (ref 96–108)
CO2 SERPL-SCNC: 29 MMOL/L (ref 21–32)
CREAT SERPL-MCNC: 1.04 MG/DL (ref 0.6–1.3)
EOSINOPHIL # BLD AUTO: 0.24 THOUSAND/ÂΜL (ref 0–0.61)
EOSINOPHIL NFR BLD AUTO: 3 % (ref 0–6)
ERYTHROCYTE [DISTWIDTH] IN BLOOD BY AUTOMATED COUNT: 15.4 % (ref 11.6–15.1)
GFR SERPL CREATININE-BSD FRML MDRD: 78 ML/MIN/1.73SQ M
GLUCOSE SERPL-MCNC: 120 MG/DL (ref 65–140)
HCT VFR BLD AUTO: 49 % (ref 36.5–49.3)
HGB BLD-MCNC: 15.7 G/DL (ref 12–17)
IMM GRANULOCYTES # BLD AUTO: 0.03 THOUSAND/UL (ref 0–0.2)
IMM GRANULOCYTES NFR BLD AUTO: 0 % (ref 0–2)
LIPASE SERPL-CCNC: 457 U/L (ref 11–82)
LYMPHOCYTES # BLD AUTO: 1.86 THOUSANDS/ÂΜL (ref 0.6–4.47)
LYMPHOCYTES NFR BLD AUTO: 21 % (ref 14–44)
MCH RBC QN AUTO: 27.9 PG (ref 26.8–34.3)
MCHC RBC AUTO-ENTMCNC: 32 G/DL (ref 31.4–37.4)
MCV RBC AUTO: 87 FL (ref 82–98)
MONOCYTES # BLD AUTO: 0.67 THOUSAND/ÂΜL (ref 0.17–1.22)
MONOCYTES NFR BLD AUTO: 8 % (ref 4–12)
NEUTROPHILS # BLD AUTO: 5.88 THOUSANDS/ÂΜL (ref 1.85–7.62)
NEUTS SEG NFR BLD AUTO: 67 % (ref 43–75)
NRBC BLD AUTO-RTO: 0 /100 WBCS
PLATELET # BLD AUTO: 326 THOUSANDS/UL (ref 149–390)
PMV BLD AUTO: 9.1 FL (ref 8.9–12.7)
POTASSIUM SERPL-SCNC: 3.9 MMOL/L (ref 3.5–5.3)
PROT SERPL-MCNC: 7.4 G/DL (ref 6.4–8.4)
RBC # BLD AUTO: 5.62 MILLION/UL (ref 3.88–5.62)
SODIUM SERPL-SCNC: 136 MMOL/L (ref 135–147)
TRIGL SERPL-MCNC: 77 MG/DL
WBC # BLD AUTO: 8.73 THOUSAND/UL (ref 4.31–10.16)

## 2024-07-14 PROCEDURE — 94640 AIRWAY INHALATION TREATMENT: CPT

## 2024-07-14 PROCEDURE — 83690 ASSAY OF LIPASE: CPT | Performed by: EMERGENCY MEDICINE

## 2024-07-14 PROCEDURE — 85025 COMPLETE CBC W/AUTO DIFF WBC: CPT | Performed by: EMERGENCY MEDICINE

## 2024-07-14 PROCEDURE — 96375 TX/PRO/DX INJ NEW DRUG ADDON: CPT

## 2024-07-14 PROCEDURE — 84478 ASSAY OF TRIGLYCERIDES: CPT | Performed by: EMERGENCY MEDICINE

## 2024-07-14 PROCEDURE — 36415 COLL VENOUS BLD VENIPUNCTURE: CPT | Performed by: EMERGENCY MEDICINE

## 2024-07-14 PROCEDURE — 99285 EMERGENCY DEPT VISIT HI MDM: CPT

## 2024-07-14 PROCEDURE — 99223 1ST HOSP IP/OBS HIGH 75: CPT | Performed by: INTERNAL MEDICINE

## 2024-07-14 PROCEDURE — 74177 CT ABD & PELVIS W/CONTRAST: CPT

## 2024-07-14 PROCEDURE — 99285 EMERGENCY DEPT VISIT HI MDM: CPT | Performed by: EMERGENCY MEDICINE

## 2024-07-14 PROCEDURE — 80053 COMPREHEN METABOLIC PANEL: CPT | Performed by: EMERGENCY MEDICINE

## 2024-07-14 PROCEDURE — 96374 THER/PROPH/DIAG INJ IV PUSH: CPT

## 2024-07-14 RX ORDER — KETOROLAC TROMETHAMINE 30 MG/ML
15 INJECTION, SOLUTION INTRAMUSCULAR; INTRAVENOUS ONCE
Status: COMPLETED | OUTPATIENT
Start: 2024-07-14 | End: 2024-07-14

## 2024-07-14 RX ORDER — OMEPRAZOLE 20 MG/1
40 CAPSULE, DELAYED RELEASE ORAL 2 TIMES DAILY
Status: DISCONTINUED | OUTPATIENT
Start: 2024-07-14 | End: 2024-07-14 | Stop reason: SDUPTHER

## 2024-07-14 RX ORDER — DIAZEPAM 5 MG/1
5 TABLET ORAL EVERY 12 HOURS PRN
Status: DISCONTINUED | OUTPATIENT
Start: 2024-07-14 | End: 2024-07-15 | Stop reason: HOSPADM

## 2024-07-14 RX ORDER — SODIUM CHLORIDE, SODIUM LACTATE, POTASSIUM CHLORIDE, CALCIUM CHLORIDE 600; 310; 30; 20 MG/100ML; MG/100ML; MG/100ML; MG/100ML
150 INJECTION, SOLUTION INTRAVENOUS CONTINUOUS
Status: DISCONTINUED | OUTPATIENT
Start: 2024-07-14 | End: 2024-07-15 | Stop reason: HOSPADM

## 2024-07-14 RX ORDER — EPINEPHRINE 0.3 MG/.3ML
0.3 INJECTION SUBCUTANEOUS ONCE
Status: DISCONTINUED | OUTPATIENT
Start: 2024-07-14 | End: 2024-07-15 | Stop reason: HOSPADM

## 2024-07-14 RX ORDER — ENOXAPARIN SODIUM 100 MG/ML
40 INJECTION SUBCUTANEOUS DAILY
Status: DISCONTINUED | OUTPATIENT
Start: 2024-07-14 | End: 2024-07-15 | Stop reason: HOSPADM

## 2024-07-14 RX ORDER — LORATADINE 10 MG/1
10 TABLET ORAL DAILY
Status: DISCONTINUED | OUTPATIENT
Start: 2024-07-14 | End: 2024-07-15 | Stop reason: HOSPADM

## 2024-07-14 RX ORDER — OMEPRAZOLE 20 MG/1
40 CAPSULE, DELAYED RELEASE ORAL
Status: DISCONTINUED | OUTPATIENT
Start: 2024-07-14 | End: 2024-07-15 | Stop reason: HOSPADM

## 2024-07-14 RX ORDER — MORPHINE SULFATE 4 MG/ML
4 INJECTION, SOLUTION INTRAMUSCULAR; INTRAVENOUS EVERY 4 HOURS PRN
Status: DISCONTINUED | OUTPATIENT
Start: 2024-07-14 | End: 2024-07-15 | Stop reason: HOSPADM

## 2024-07-14 RX ORDER — KETOROLAC TROMETHAMINE 30 MG/ML
15 INJECTION, SOLUTION INTRAMUSCULAR; INTRAVENOUS EVERY 6 HOURS PRN
Status: DISCONTINUED | OUTPATIENT
Start: 2024-07-14 | End: 2024-07-15 | Stop reason: HOSPADM

## 2024-07-14 RX ORDER — FAMOTIDINE 20 MG/1
40 TABLET, FILM COATED ORAL 2 TIMES DAILY
Status: DISCONTINUED | OUTPATIENT
Start: 2024-07-14 | End: 2024-07-15 | Stop reason: HOSPADM

## 2024-07-14 RX ORDER — ALBUTEROL SULFATE 2.5 MG/3ML
2.5 SOLUTION RESPIRATORY (INHALATION) EVERY 6 HOURS PRN
Status: DISCONTINUED | OUTPATIENT
Start: 2024-07-14 | End: 2024-07-15 | Stop reason: HOSPADM

## 2024-07-14 RX ORDER — AZELASTINE 1 MG/ML
1 SPRAY, METERED NASAL 2 TIMES DAILY
Status: DISCONTINUED | OUTPATIENT
Start: 2024-07-14 | End: 2024-07-15 | Stop reason: HOSPADM

## 2024-07-14 RX ORDER — METOCLOPRAMIDE HYDROCHLORIDE 5 MG/ML
10 INJECTION INTRAMUSCULAR; INTRAVENOUS ONCE
Status: COMPLETED | OUTPATIENT
Start: 2024-07-14 | End: 2024-07-14

## 2024-07-14 RX ORDER — MONTELUKAST SODIUM 10 MG/1
10 TABLET ORAL
Status: DISCONTINUED | OUTPATIENT
Start: 2024-07-14 | End: 2024-07-15 | Stop reason: HOSPADM

## 2024-07-14 RX ORDER — MORPHINE SULFATE 10 MG/ML
6 INJECTION, SOLUTION INTRAMUSCULAR; INTRAVENOUS ONCE
Status: COMPLETED | OUTPATIENT
Start: 2024-07-14 | End: 2024-07-14

## 2024-07-14 RX ORDER — HYDRALAZINE HYDROCHLORIDE 20 MG/ML
10 INJECTION INTRAMUSCULAR; INTRAVENOUS EVERY 6 HOURS PRN
Status: DISCONTINUED | OUTPATIENT
Start: 2024-07-14 | End: 2024-07-15 | Stop reason: HOSPADM

## 2024-07-14 RX ORDER — ONDANSETRON 2 MG/ML
4 INJECTION INTRAMUSCULAR; INTRAVENOUS EVERY 6 HOURS PRN
Status: DISCONTINUED | OUTPATIENT
Start: 2024-07-14 | End: 2024-07-15 | Stop reason: HOSPADM

## 2024-07-14 RX ORDER — ONDANSETRON 2 MG/ML
4 INJECTION INTRAMUSCULAR; INTRAVENOUS ONCE
Status: COMPLETED | OUTPATIENT
Start: 2024-07-14 | End: 2024-07-14

## 2024-07-14 RX ADMIN — FLUTICASONE FUROATE, UMECLIDINIUM BROMIDE AND VILANTEROL TRIFENATATE 1 PUFF: 100; 62.5; 25 POWDER RESPIRATORY (INHALATION) at 21:27

## 2024-07-14 RX ADMIN — ONDANSETRON 4 MG: 2 INJECTION INTRAMUSCULAR; INTRAVENOUS at 11:12

## 2024-07-14 RX ADMIN — ENOXAPARIN SODIUM 40 MG: 40 INJECTION SUBCUTANEOUS at 16:40

## 2024-07-14 RX ADMIN — METOCLOPRAMIDE 10 MG: 5 INJECTION, SOLUTION INTRAMUSCULAR; INTRAVENOUS at 19:44

## 2024-07-14 RX ADMIN — Medication 2.5 MG: at 16:20

## 2024-07-14 RX ADMIN — FAMOTIDINE 40 MG: 20 TABLET, FILM COATED ORAL at 18:25

## 2024-07-14 RX ADMIN — SODIUM CHLORIDE, SODIUM LACTATE, POTASSIUM CHLORIDE, AND CALCIUM CHLORIDE 150 ML/HR: .6; .31; .03; .02 INJECTION, SOLUTION INTRAVENOUS at 16:37

## 2024-07-14 RX ADMIN — IOHEXOL 80 ML: 350 INJECTION, SOLUTION INTRAVENOUS at 11:35

## 2024-07-14 RX ADMIN — KETOROLAC TROMETHAMINE 15 MG: 30 INJECTION, SOLUTION INTRAMUSCULAR; INTRAVENOUS at 13:28

## 2024-07-14 RX ADMIN — MONTELUKAST 10 MG: 10 TABLET, FILM COATED ORAL at 21:26

## 2024-07-14 RX ADMIN — SODIUM CHLORIDE, SODIUM LACTATE, POTASSIUM CHLORIDE, AND CALCIUM CHLORIDE 150 ML/HR: .6; .31; .03; .02 INJECTION, SOLUTION INTRAVENOUS at 23:29

## 2024-07-14 RX ADMIN — ALBUTEROL SULFATE 2.5 MG: 2.5 SOLUTION RESPIRATORY (INHALATION) at 23:20

## 2024-07-14 RX ADMIN — MORPHINE SULFATE 6 MG: 10 INJECTION INTRAVENOUS at 09:46

## 2024-07-14 RX ADMIN — ONDANSETRON 4 MG: 2 INJECTION INTRAMUSCULAR; INTRAVENOUS at 16:22

## 2024-07-14 RX ADMIN — LORATADINE 10 MG: 10 TABLET ORAL at 16:30

## 2024-07-14 NOTE — ASSESSMENT & PLAN NOTE
No pre-existing history of hypertension.  Hold off on starting any regular oral medications.  Add PRN IV labetalol.   Control pain / pancreatitis symptoms as above.  Monitor blood pressures after pain controlled.

## 2024-07-14 NOTE — ASSESSMENT & PLAN NOTE
Patient insistent that he wants to keep epi-pen at his bedside.  I am ok with this.   Avoid multiple competing scents in his room and heavily perfumed care team members as this is the largest trigger for his anaphylaxis episodes, he states.   Serial exams.    Podiatry Podiatry Podiatry Podiatry Internal Medicine Internal Medicine

## 2024-07-14 NOTE — PLAN OF CARE
Problem: PAIN - ADULT  Goal: Verbalizes/displays adequate comfort level or baseline comfort level  Description: Interventions:  - Encourage patient to monitor pain and request assistance  - Assess pain using appropriate pain scale  - Administer analgesics based on type and severity of pain and evaluate response  - Implement non-pharmacological measures as appropriate and evaluate response  - Consider cultural and social influences on pain and pain management  - Notify physician/advanced practitioner if interventions unsuccessful or patient reports new pain  Outcome: Progressing     Problem: INFECTION - ADULT  Goal: Absence or prevention of progression during hospitalization  Description: INTERVENTIONS:  - Assess and monitor for signs and symptoms of infection  - Monitor lab/diagnostic results  - Monitor all insertion sites, i.e. indwelling lines, tubes, and drains  - Monitor endotracheal if appropriate and nasal secretions for changes in amount and color  - [place] appropriate cooling/warming therapies per order  - Administer medications as ordered  - Instruct and encourage patient and family to use good hand hygiene technique  - Identify and instruct in appropriate isolation precautions for identified infection/condition  Outcome: Progressing     Problem: SAFETY ADULT  Goal: Patient will remain free of falls  Description: INTERVENTIONS:  - Educate patient/family on patient safety including physical limitations  - Instruct patient to call for assistance with activity   - Consult OT/PT to assist with strengthening/mobility   - Keep Call bell within reach  - Keep bed low and locked with side rails adjusted as appropriate  - Keep care items and personal belongings within reach  - Initiate and maintain comfort rounds  - Make Fall Risk Sign visible to staff  - Offer Toileting every 2 Hours, in advance of need    Problem: DISCHARGE PLANNING  Goal: Discharge to home or other facility with appropriate  resources  Description: INTERVENTIONS:  - Identify barriers to discharge w/patient and caregiver  - Arrange for needed discharge resources and transportation as appropriate  - Identify discharge learning needs (meds, wound care, etc.)  - Arrange for interpretive services to assist at discharge as needed  - Refer to Case Management Department for coordinating discharge planning if the patient needs post-hospital services based on physician/advanced practitioner order or complex needs related to functional status, cognitive ability, or social support system  Outcome: Progressing   - Apply yellow socks and bracelet for high fall risk patients  - Consider moving patient to room near nurses station  Outcome: Progressing

## 2024-07-14 NOTE — H&P
[facility]  H&P  Name: Donato Adler 58 y.o. male I MRN: [id number]  Unit/Bed#: ED-17 I Date of Admission: 7/14/2024   Date of Service: 7/14/2024 I Hospital Day: 0      Assessment & Plan   * Idiopathic acute pancreatitis without infection or necrosis  Assessment & Plan  Treatments -   IVF - Lactated Ringers at 150 ml/hr.   Diet - Clear liquid diet.  Consider advancing to low fat diet tomorrow morning if doing better clinically.   Antiemetics - Zofran PRN  Pain control -   Mild Pain - Tylenol  Moderate Pain - Oxycodone 2.5 mg q4h PRN  Severe Pain - Morphine 4 mg IV q4h PRN  Breakthrough pain - Toradol 15 mg IV q6h PRN  Monitor pain levels.   Evaluations -   LFTs within normal limits and no gallstones on imaging.  Sludge or recently passed stone still possible, but would expect some LFT elevation to still be present.   Triglyceride level not elevated.   Alcohol assessment - Denies any and all alcohol use.   Drug assessment -   Omeprazole which he takes at very high doses of 40 mg twice to three times daily is the most likely medication to cause this but he states he needs this for really bed acid reflux.  Reports that he has tried numerous PPIs before.  Should discuss further with his outpatient gastroenterologist.   Salicylic acid can potentiate pancreatitis.  This is another potential cause of this and should be discontinued.  He takes it PRN for back pain as well as NSAIDs.    No signs of infection. And no outright evidence for trauma or ischemia. However, if fever, could consider further evaluation toward this end.   Could check alpha-1 antitrypsin level, as maybe some air trapping on older PFT from 2018 (though not meeting COPD criteria at that time) and also recurrent idiopathic pancreatitis.   Consider GI evaluation outpatient, or, if fails to rapidly improve, as inpatient.   He is hoping to feel better and discharge tomorrow.     Elevated blood pressure reading  Assessment & Plan  No  pre-existing history of hypertension.  Hold off on starting any regular oral medications.  Add PRN IV hydralazine.  Control pain / pancreatitis symptoms as above.  Monitor blood pressures after pain controlled.     Mild intermittent asthma  Assessment & Plan  Continue home medications for this.    Home medication list reviewed with patient.   Will use his own trelegy from home.     History of anaphylaxis  Assessment & Plan  Patient insistent that he wants to keep epi-pen at his bedside.  I am ok with this.   Avoid multiple competing scents in his room and heavily perfumed care team members as this is the largest trigger for his anaphylaxis episodes, he states.   Serial exams.     GERD (gastroesophageal reflux disease)  Assessment & Plan  Continue famotidine and omeprazole at home doses.  Doses are high but confirmed.    Discuss with his outpatient gastroenterologist for alternative regimen.   No suspicion for active gastritis currently.          VTE Pharmacologic Prophylaxis: VTE Score: 5 High Risk (Score >/= 5) - Pharmacological DVT Prophylaxis Ordered: enoxaparin (Lovenox). Sequential Compression Devices Ordered.  Code Status: Level 1 Full Code  Discussion with family: Patient declined call to .     Anticipated Length of Stay: Patient will be admitted on an observation basis with an anticipated length of stay of less than 2 midnights secondary to treatment of acute pancreatitis..    Total Time Spent on Date of Encounter in care of patient: 50 mins. This time was spent on one or more of the following: performing physical exam; counseling and coordination of care; obtaining or reviewing history; documenting in the medical record; reviewing/ordering tests, medications or procedures; communicating with other healthcare professionals and discussing with patient's family/caregivers.    Chief Complaint: Abdominal Pain    History of Present Illness:  Donato Adler is a 58 y.o. male with a PMH of  pancreatitis, GERD, prior PE, and mild intermittent asthma who presents with abdominal pain.  The patient states that symptoms have been ongoing for about 2 days.  He states that the pain is in the abdomen but somewhat poorly localized although possibly more towards the upper abdomen but extends all the way down into the lower quadrants as well.  The patient's pain also radiates to the back bilaterally more in the flank region but not to the shoulder blades.  There are no associated urinary symptoms such as burning with urine, hematuria, or other changes in urine color or odor.  The patient has been having normal bowel movements for him in terms of consistency and has had no issues with constipation.  The patient intermittently gets blood in his stools and is supposed to have a colonoscopy done in the near future but this is not new or changed in the last 2 days.  He denies any melena.  The patient does have some increased bloating but denies any increased belching.  The patient does use aspirin and NSAIDs for pain in his lower back which are chronic ever since a trauma in the past.  The patient has had some hiccups today but not intractable.  The patient's pain seems to be squeezing at times but at other times it is stabbing such as a right-sided abdominal pain that he is currently when I am seeing him.  He did feel like morphine given to him about 5 hours ago and the 6 mg dose made him slightly dizzy and nauseous but did help significantly with the pain symptoms.  The patient has had no documented fevers here in the hospital and did not check his temperature at home.  However, he states that last night he had 2 times.  Hot flash type symptoms and then he had 1 time where he was feeling very cold and had to put lots of blankets on.  The patient has had no respiratory symptoms.  The patient has a history of pancreatitis in the past and sees a specialist at [place].  The trigger for the patient's pancreatitis has  been unclear before although when I mention the possibility medication, the patient indicated that his gastroenterologist was also pondering whether medication is the cause as well.  The patient adamantly denies any alcohol consumption.  He does not follow a strict low-fat diet.    Review of Systems:  Review of Systems   Constitutional:  Positive for chills (once). Negative for activity change, appetite change and diaphoresis.   HENT:  Negative for congestion, ear pain, rhinorrhea, sore throat and trouble swallowing.    Eyes: Negative.    Respiratory:  Negative for cough, choking, shortness of breath and wheezing.    Cardiovascular:  Negative for chest pain, palpitations and leg swelling.   Gastrointestinal:  Positive for abdominal pain and blood in stool. Negative for abdominal distention (but bloating feeling), constipation, diarrhea, nausea and vomiting.   Endocrine: Negative.    Genitourinary:  Positive for flank pain. Negative for dysuria, frequency, hematuria and urgency.   Musculoskeletal:  Positive for back pain. Negative for arthralgias and myalgias. Gait problem: chronic.  Skin:  Negative for rash.   Allergic/Immunologic: Positive for environmental allergies.   Neurological:  Negative for dizziness, syncope, light-headedness, numbness and headaches.   Hematological: Negative.    Psychiatric/Behavioral: Negative.     All other systems reviewed and are negative.      Past Medical and Surgical History:   Past Medical History:   Diagnosis Date    Achilles tendon tear     Allergic     Asthma     Cancer (HCC)     skin cancer    Chronic back pain     GERD (gastroesophageal reflux disease)     Pancreatitis     Pulmonary embolism (HCC)     Spinal cord injury     lumbar and cervical    Umbilical hernia        Past Surgical History:   Procedure Laterality Date    COLONOSCOPY  2019    [facility]    SHOULDER SURGERY      3 times - has pins and anchors    SINUS SURGERY  10/2023       Meds/Allergies:  Prior to  "Admission medications    Medication Sig Start Date End Date Taking? Authorizing Provider   albuterol (2.5 mg/3 mL) 0.083 % nebulizer solution Take 2.5 mg by nebulization every 6 (six) hours as needed for wheezing or shortness of breath    Historical Provider, MD   albuterol (PROVENTIL HFA,VENTOLIN HFA) 90 mcg/act inhaler Inhale 2 puffs every 6 (six) hours as needed for wheezing    Historical Provider, MD   Azelastine HCl 137 MCG/SPRAY SOLN PLEASE SEE ATTACHED FOR DETAILED DIRECTIONS 5/31/23   Historical Provider, MD   budesonide (PULMICORT) 0.25 mg/2 mL nebulizer solution \"NOT FOR NEBULIZATION, FOR IRRIGATION ONLY-Mix 1 ampule in 8oz of NaCI, irrigate each nostril with 4oz, twice a day\" 12/28/20   Historical Provider, MD   cetirizine (ZyrTEC) 10 mg tablet Take 10 mg by mouth daily    Historical Provider, MD   diazepam (VALIUM) 5 mg tablet Take 1 tablet (5 mg total) by mouth every 12 (twelve) hours as needed for muscle spasms for up to 10 days 10/18/21 7/5/23  ETHAN Don   EPINEPHrine (EPIPEN) 0.3 mg/0.3 mL SOAJ Inject 0.3 mL (0.3 mg total) into a muscle once for 1 dose 5/16/24 5/16/24  Adán Mckeon MD   famotidine (PEPCID) 40 MG tablet Take 80 mg by mouth daily 9/18/21   Historical Provider, MD   fluticasone (FLONASE) 50 mcg/act nasal spray 1 spray into each nostril daily  Patient not taking: Reported on 7/5/2023    Historical Provider, MD   fluticasone-salmeterol (ADVAIR) 250-50 mcg/dose inhaler Inhale 1 puff every 12 (twelve) hours.  Patient not taking: Reported on 1/14/2022    Historical Provider, MD   Hypertonic Nasal Wash (Sinus Rinse Refill) PACK  11/11/21   Historical Provider, MD   loratadine (CLARITIN) 10 mg tablet Take 10 mg by mouth daily    Historical Provider, MD   montelukast (SINGULAIR) 10 mg tablet Take 10 mg by mouth daily at bedtime    Historical Provider, MD   omeprazole (PriLOSEC) 40 MG capsule Take 40 mg by mouth daily    Historical Provider, MD   ondansetron (ZOFRAN-ODT) 4 mg " disintegrating tablet Take 1 tablet (4 mg total) by mouth every 8 (eight) hours as needed for nausea or vomiting 7/5/23   Coleen Reillyxt, DO   scopolamine (TRANSDERM-SCOP) 1 mg/3 days TD 72 hr patch Place 1 patch on the skin over 72 hours every third day 7/5/23   Coleen Walsh, DO   sildenafil (VIAGRA) 100 mg tablet Take 1 tablet (100 mg total) by mouth daily as needed for erectile dysfunction 10/18/21   ETHAN Don Ellipta 100-62.5-25 MCG/INH inhaler Inhale 1 puff daily   12/17/21   Historical Provider, MD LÓPEZ have reviewed home medications with patient personally.    Allergies:   Allergies   Allergen Reactions    Other Shortness Of Breath and Throat Swelling     Perfume - pt would like to wait in family waiting room if he needs to wait.   Applies to any chemical sent    Other Shortness Of Breath     Air fresheners, lotions, perfumes, Deoderant, etc     Penicillins Anaphylaxis    Xanax [Alprazolam] Anaphylaxis       Social History:  Marital Status: /Civil Union   Patient Pre-hospital Living Situation: Home  Patient Pre-hospital Level of Mobility: walks  Patient Pre-hospital Diet Restrictions: None  Substance Use History:   Social History     Substance and Sexual Activity   Alcohol Use Not Currently    Comment: Occasional     Social History     Tobacco Use   Smoking Status Never   Smokeless Tobacco Never     Social History     Substance and Sexual Activity   Drug Use No       Family History:  Family History   Problem Relation Age of Onset    Heart disease Paternal Grandmother        Physical Exam:     Vitals:   Blood Pressure: 151/72 (07/14/24 1250)  Pulse: 62 (07/14/24 1250)  Temperature: 98 °F (36.7 °C) (07/14/24 0930)  Temp Source: Oral (07/14/24 0930)  Respirations: 16 (07/14/24 1250)  SpO2: 98 % (07/14/24 1250)    Physical Exam  Vitals reviewed.   Constitutional:       General: He is not in acute distress.     Appearance: He is not diaphoretic.   HENT:      Head: Normocephalic and  atraumatic.      Right Ear: External ear normal.      Left Ear: External ear normal.      Nose: Nose normal.      Mouth/Throat:      Mouth: Mucous membranes are moist.      Pharynx: Oropharynx is clear. No oropharyngeal exudate or posterior oropharyngeal erythema.   Eyes:      Pupils: Pupils are equal, round, and reactive to light.   Cardiovascular:      Rate and Rhythm: Normal rate and regular rhythm.      Pulses: Normal pulses.      Heart sounds: No murmur heard.  Pulmonary:      Breath sounds: No wheezing, rhonchi or rales.      Comments: Slightly decreased breath sounds  Abdominal:      General: Bowel sounds are normal. There is no distension.      Palpations: Abdomen is soft.      Tenderness: There is abdominal tenderness (poorly localized and present with palpation and percussion.). There is no right CVA tenderness, left CVA tenderness or guarding.   Musculoskeletal:         General: No swelling or tenderness.   Skin:     General: Skin is warm and dry.      Capillary Refill: Capillary refill takes less than 2 seconds.      Coloration: Skin is not jaundiced.      Findings: No bruising or rash.   Neurological:      General: No focal deficit present.      Mental Status: He is alert and oriented to person, place, and time.   Psychiatric:         Mood and Affect: Mood normal.         Thought Content: Thought content normal.          Additional Data:     Lab Results:  Results from last 7 days   Lab Units 07/14/24  0948   WBC Thousand/uL 8.73   HEMOGLOBIN g/dL 15.7   HEMATOCRIT % 49.0   PLATELETS Thousands/uL 326   SEGS PCT % 67   LYMPHO PCT % 21   MONO PCT % 8   EOS PCT % 3     Results from last 7 days   Lab Units 07/14/24  0948   SODIUM mmol/L 136   POTASSIUM mmol/L 3.9   CHLORIDE mmol/L 102   CO2 mmol/L 29   BUN mg/dL 11   CREATININE mg/dL 1.04   ANION GAP mmol/L 5   CALCIUM mg/dL 9.4   ALBUMIN g/dL 4.3   TOTAL BILIRUBIN mg/dL 0.65   ALK PHOS U/L 66   ALT U/L 27   AST U/L 18   GLUCOSE RANDOM mg/dL 120            "  No results found for: \"HGBA1C\"        Lines/Drains:  Invasive Devices       Peripheral Intravenous Line  Duration             Peripheral IV 07/14/24 Left Antecubital <1 day                        Imaging: Reviewed radiology reports from this admission including: abdominal/pelvic CT  CT abdomen pelvis with contrast   Final Result by Lon Black MD (07/14 1244)      Inflammatory stranding adjacent to the head of the pancreas suspicious for acute pancreatitis. No CT evidence of pancreatic necrosis or peripancreatic fluid collection.      The study was marked in EPIC for immediate notification.         Workstation performed: [id number]             EKG and Other Studies Reviewed on Admission:   EKG: No EKG obtained.    ** Please Note: This note has been constructed using a voice recognition system. **      "

## 2024-07-14 NOTE — ED PROVIDER NOTES
"History  Chief Complaint   Patient presents with    Abdominal Pain     Pt reports abd pain x 1.5 days -N/V/D. Hx of similar pain dx with pancreatitis      58-year-old male with history of pancreatitis however denies drink alcohol presents for evaluation of epigastric and mid abdominal pain for the past several days.  No bloody stools.  No nausea vomiting.  No chest pain or shortness of breath.  No palpitations.  No flank pain.      History provided by:  Patient  Abdominal Pain  Associated symptoms: no anorexia, no chest pain, no chills, no constipation, no cough, no diarrhea, no dysuria, no fever, no flatus, no hematuria, no melena and no nausea        Prior to Admission Medications   Prescriptions Last Dose Informant Patient Reported? Taking?   Azelastine HCl 137 MCG/SPRAY SOLN   Yes No   Sig: PLEASE SEE ATTACHED FOR DETAILED DIRECTIONS   EPINEPHrine (EPIPEN) 0.3 mg/0.3 mL SOAJ   No No   Sig: Inject 0.3 mL (0.3 mg total) into a muscle once for 1 dose   Hypertonic Nasal Wash (Sinus Rinse Refill) PACK  Self Yes No   Trelegy Ellipta 100-62.5-25 MCG/INH inhaler  Self Yes No   Sig: Inhale 1 puff daily     albuterol (2.5 mg/3 mL) 0.083 % nebulizer solution  Self Yes No   Sig: Take 2.5 mg by nebulization every 6 (six) hours as needed for wheezing or shortness of breath   albuterol (PROVENTIL HFA,VENTOLIN HFA) 90 mcg/act inhaler  Self Yes No   Sig: Inhale 2 puffs every 6 (six) hours as needed for wheezing   budesonide (PULMICORT) 0.25 mg/2 mL nebulizer solution  Self Yes No   Sig: \"NOT FOR NEBULIZATION, FOR IRRIGATION ONLY-Mix 1 ampule in 8oz of NaCI, irrigate each nostril with 4oz, twice a day\"   cetirizine (ZyrTEC) 10 mg tablet  Self Yes No   Sig: Take 10 mg by mouth daily   diazepam (VALIUM) 5 mg tablet  Self No No   Sig: Take 1 tablet (5 mg total) by mouth every 12 (twelve) hours as needed for muscle spasms for up to 10 days   famotidine (PEPCID) 40 MG tablet  Self Yes No   Sig: Take 80 mg by mouth daily   fluticasone " (FLONASE) 50 mcg/act nasal spray  Self Yes No   Si spray into each nostril daily   Patient not taking: Reported on 2023   fluticasone-salmeterol (ADVAIR) 250-50 mcg/dose inhaler  Self Yes No   Sig: Inhale 1 puff every 12 (twelve) hours.   Patient not taking: Reported on 2022   loratadine (CLARITIN) 10 mg tablet  Self Yes No   Sig: Take 10 mg by mouth daily   montelukast (SINGULAIR) 10 mg tablet  Self Yes No   Sig: Take 10 mg by mouth daily at bedtime   omeprazole (PriLOSEC) 40 MG capsule  Self Yes No   Sig: Take 40 mg by mouth daily   ondansetron (ZOFRAN-ODT) 4 mg disintegrating tablet   No No   Sig: Take 1 tablet (4 mg total) by mouth every 8 (eight) hours as needed for nausea or vomiting   scopolamine (TRANSDERM-SCOP) 1 mg/3 days TD 72 hr patch   No No   Sig: Place 1 patch on the skin over 72 hours every third day   sildenafil (VIAGRA) 100 mg tablet  Self No No   Sig: Take 1 tablet (100 mg total) by mouth daily as needed for erectile dysfunction      Facility-Administered Medications: None       Past Medical History:   Diagnosis Date    Allergic     Asthma     Cancer (HCC)     skin cancer    Chronic back pain     GERD (gastroesophageal reflux disease)     Pancreatitis     Pulmonary embolism (HCC)     Spinal cord injury        Past Surgical History:   Procedure Laterality Date    COLONOSCOPY      [facility]    SHOULDER SURGERY         Family History   Problem Relation Age of Onset    Heart disease Paternal Grandmother      I have reviewed and agree with the history as documented.    E-Cigarette/Vaping    E-Cigarette Use Never User      E-Cigarette/Vaping Substances    Nicotine No     THC No     CBD No     Flavoring No     Other No     Unknown No      Social History     Tobacco Use    Smoking status: Never    Smokeless tobacco: Never   Vaping Use    Vaping status: Never Used   Substance Use Topics    Alcohol use: Not Currently     Comment: Occasional    Drug use: No       Review of Systems    Constitutional:  Negative for activity change, appetite change, chills and fever.   HENT:  Negative for congestion, drooling, ear pain, facial swelling, hearing loss and mouth sores.    Eyes:  Negative for pain, discharge and itching.   Respiratory:  Negative for apnea, cough, chest tightness and stridor.    Cardiovascular:  Negative for chest pain.   Gastrointestinal:  Positive for abdominal pain. Negative for abdominal distention, anorexia, constipation, diarrhea, flatus, melena and nausea.   Endocrine: Negative for cold intolerance, heat intolerance and polyphagia.   Genitourinary:  Negative for dysuria and hematuria.   Allergic/Immunologic: Negative for environmental allergies and food allergies.   Neurological:  Negative for dizziness, seizures and weakness.   Hematological:  Negative for adenopathy.   Psychiatric/Behavioral:  Negative for agitation, confusion, decreased concentration, dysphoric mood and hallucinations.        Physical Exam  Physical Exam  Vitals and nursing note reviewed.   Constitutional:       General: He is not in acute distress.     Appearance: He is well-developed.   HENT:      Head: Normocephalic and atraumatic.   Eyes:      Conjunctiva/sclera: Conjunctivae normal.   Cardiovascular:      Rate and Rhythm: Normal rate and regular rhythm.      Heart sounds: No murmur heard.  Pulmonary:      Effort: Pulmonary effort is normal. No respiratory distress.      Breath sounds: Normal breath sounds.   Abdominal:      Palpations: Abdomen is soft.      Tenderness: There is abdominal tenderness in the epigastric area.      Hernia: No hernia is present.   Genitourinary:     Testes:         Right: Mass not present.   Musculoskeletal:         General: No swelling.      Cervical back: Neck supple.   Skin:     General: Skin is warm and dry.      Capillary Refill: Capillary refill takes less than 2 seconds.      Coloration: Skin is not cyanotic or mottled.   Neurological:      General: No focal deficit  present.      Mental Status: He is alert.   Psychiatric:         Mood and Affect: Mood normal.         Vital Signs  ED Triage Vitals   Temperature Pulse Respirations Blood Pressure SpO2   07/14/24 0930 07/14/24 0930 07/14/24 0930 07/14/24 0930 07/14/24 0930   98 °F (36.7 °C) 57 18 (!) 178/96 98 %      Temp Source Heart Rate Source Patient Position - Orthostatic VS BP Location FiO2 (%)   07/14/24 0930 07/14/24 1250 07/14/24 0930 07/14/24 1250 --   Oral Monitor Sitting Right arm       Pain Score       07/14/24 0930       8           Vitals:    07/14/24 0930 07/14/24 1250   BP: (!) 178/96 151/72   Pulse: 57 62   Patient Position - Orthostatic VS: Sitting Sitting         Visual Acuity      ED Medications  Medications   morphine injection 6 mg (6 mg Intravenous Given 7/14/24 0946)   ondansetron (ZOFRAN) injection 4 mg (4 mg Intravenous Given 7/14/24 1112)   iohexol (OMNIPAQUE) 350 MG/ML injection (MULTI-DOSE) 80 mL (80 mL Intravenous Given 7/14/24 1135)   ketorolac (TORADOL) injection 15 mg (15 mg Intravenous Given 7/14/24 1328)       Diagnostic Studies  Results Reviewed       Procedure Component Value Units Date/Time    Triglycerides [[id number]]  (Normal) Collected: 07/14/24 0948    Lab Status: Final result Specimen: Blood from Arm, Left Updated: 07/14/24 1403     Triglycerides 77 mg/dL     Comprehensive metabolic panel [[id number]] Collected: 07/14/24 0948    Lab Status: Final result Specimen: Blood from Arm, Left Updated: 07/14/24 1018     Sodium 136 mmol/L      Potassium 3.9 mmol/L      Chloride 102 mmol/L      CO2 29 mmol/L      ANION GAP 5 mmol/L      BUN 11 mg/dL      Creatinine 1.04 mg/dL      Glucose 120 mg/dL      Calcium 9.4 mg/dL      AST 18 U/L      ALT 27 U/L      Alkaline Phosphatase 66 U/L      Total Protein 7.4 g/dL      Albumin 4.3 g/dL      Total Bilirubin 0.65 mg/dL      eGFR 78 ml/min/1.73sq m     Narrative:      [employer] guidelines for Chronic Kidney Disease (CKD):     Stage  1 with normal or high GFR (GFR > 90 mL/min/1.73 square meters)    Stage 2 Mild CKD (GFR = 60-89 mL/min/1.73 square meters)    Stage 3A Moderate CKD (GFR = 45-59 mL/min/1.73 square meters)    Stage 3B Moderate CKD (GFR = 30-44 mL/min/1.73 square meters)    Stage 4 Severe CKD (GFR = 15-29 mL/min/1.73 square meters)    Stage 5 End Stage CKD (GFR <15 mL/min/1.73 square meters)  Note: GFR calculation is accurate only with a steady state creatinine    Lipase [[id number]]  (Abnormal) Collected: 07/14/24 0948    Lab Status: Final result Specimen: Blood from Arm, Left Updated: 07/14/24 1018     Lipase 457 u/L     CBC and differential [[id number]]  (Abnormal) Collected: 07/14/24 0948    Lab Status: Final result Specimen: Blood from Arm, Left Updated: 07/14/24 1005     WBC 8.73 Thousand/uL      RBC 5.62 Million/uL      Hemoglobin 15.7 g/dL      Hematocrit 49.0 %      MCV 87 fL      MCH 27.9 pg      MCHC 32.0 g/dL      RDW 15.4 %      MPV 9.1 fL      Platelets 326 Thousands/uL      nRBC 0 /100 WBCs      Segmented % 67 %      Immature Grans % 0 %      Lymphocytes % 21 %      Monocytes % 8 %      Eosinophils Relative 3 %      Basophils Relative 1 %      Absolute Neutrophils 5.88 Thousands/µL      Absolute Immature Grans 0.03 Thousand/uL      Absolute Lymphocytes 1.86 Thousands/µL      Absolute Monocytes 0.67 Thousand/µL      Eosinophils Absolute 0.24 Thousand/µL      Basophils Absolute 0.05 Thousands/µL                    CT abdomen pelvis with contrast   Final Result by Lon Black MD (07/14 1244)      Inflammatory stranding adjacent to the head of the pancreas suspicious for acute pancreatitis. No CT evidence of pancreatic necrosis or peripancreatic fluid collection.      The study was marked in EPIC for immediate notification.         Workstation performed: [id number]                    Procedures  Procedures         ED Course     58-year-old male presents with epigastric and mid abdominal pain for several days.   CT shows pancreatitis.  Patient denies any alcohol abuse.  Will admit the patient for further management and care.  Triglyceride levels are pending.                                              Medical Decision Making  58-year-old male presents with epigastric and mid abdominal pain for several days.  CT shows pancreatitis.  Patient denies any alcohol abuse.  Will admit the patient for further management and care.  Triglyceride levels are pending.    Amount and/or Complexity of Data Reviewed  External Data Reviewed: labs and radiology.  Labs: ordered.  Radiology: ordered.    Risk  Prescription drug management.  Decision regarding hospitalization.                 Disposition  Final diagnoses:   Pancreatitis     Time reflects when diagnosis was documented in both MDM as applicable and the Disposition within this note       Time User Action Codes Description Comment    7/14/2024  2:08 PM Linus Glaser Add [K85.90] Pancreatitis           ED Disposition       ED Disposition   Admit    Condition   Stable    Date/Time   Sun Jul 14, 2024  2:08 PM    Comment   Case was discussed with Dr. Doll and the patient's admission status was agreed to be Admission Status: observation status to the service of Dr. Doll .               Follow-up Information    None         Patient's Medications   Discharge Prescriptions    No medications on file       No discharge procedures on file.    PDMP Review       None            ED Provider  Electronically Signed by             Linus Glaser DO  07/14/24 2376

## 2024-07-14 NOTE — ASSESSMENT & PLAN NOTE
Continue home medications for this.    Home medication list reviewed with patient.   Will use his own trelegy from home.

## 2024-07-14 NOTE — ASSESSMENT & PLAN NOTE
Continue famotidine and omeprazole at home doses.  Doses are high but confirmed.    Discuss with his outpatient gastroenterologist for alternative regimen.   No suspicion for active gastritis currently.

## 2024-07-15 VITALS
DIASTOLIC BLOOD PRESSURE: 79 MMHG | HEART RATE: 67 BPM | RESPIRATION RATE: 16 BRPM | WEIGHT: 232.14 LBS | TEMPERATURE: 98.5 F | SYSTOLIC BLOOD PRESSURE: 143 MMHG | HEIGHT: 69 IN | BODY MASS INDEX: 34.38 KG/M2 | OXYGEN SATURATION: 94 %

## 2024-07-15 LAB
ALBUMIN SERPL BCG-MCNC: 3.7 G/DL (ref 3.5–5)
ALP SERPL-CCNC: 56 U/L (ref 34–104)
ALT SERPL W P-5'-P-CCNC: 20 U/L (ref 7–52)
ANION GAP SERPL CALCULATED.3IONS-SCNC: 5 MMOL/L (ref 4–13)
AST SERPL W P-5'-P-CCNC: 13 U/L (ref 13–39)
BILIRUB SERPL-MCNC: 0.61 MG/DL (ref 0.2–1)
BUN SERPL-MCNC: 9 MG/DL (ref 5–25)
CALCIUM SERPL-MCNC: 8.9 MG/DL (ref 8.4–10.2)
CHLORIDE SERPL-SCNC: 103 MMOL/L (ref 96–108)
CO2 SERPL-SCNC: 29 MMOL/L (ref 21–32)
CREAT SERPL-MCNC: 0.9 MG/DL (ref 0.6–1.3)
ERYTHROCYTE [DISTWIDTH] IN BLOOD BY AUTOMATED COUNT: 15.4 % (ref 11.6–15.1)
GFR SERPL CREATININE-BSD FRML MDRD: 93 ML/MIN/1.73SQ M
GLUCOSE P FAST SERPL-MCNC: 116 MG/DL (ref 65–99)
GLUCOSE SERPL-MCNC: 116 MG/DL (ref 65–140)
HCT VFR BLD AUTO: 43.3 % (ref 36.5–49.3)
HGB BLD-MCNC: 14.1 G/DL (ref 12–17)
LIPASE SERPL-CCNC: 187 U/L (ref 11–82)
MAGNESIUM SERPL-MCNC: 2 MG/DL (ref 1.9–2.7)
MCH RBC QN AUTO: 28.4 PG (ref 26.8–34.3)
MCHC RBC AUTO-ENTMCNC: 32.6 G/DL (ref 31.4–37.4)
MCV RBC AUTO: 87 FL (ref 82–98)
PLATELET # BLD AUTO: 276 THOUSANDS/UL (ref 149–390)
PMV BLD AUTO: 8.9 FL (ref 8.9–12.7)
POTASSIUM SERPL-SCNC: 3.9 MMOL/L (ref 3.5–5.3)
PROT SERPL-MCNC: 6.2 G/DL (ref 6.4–8.4)
RBC # BLD AUTO: 4.97 MILLION/UL (ref 3.88–5.62)
SODIUM SERPL-SCNC: 137 MMOL/L (ref 135–147)
WBC # BLD AUTO: 10.84 THOUSAND/UL (ref 4.31–10.16)

## 2024-07-15 PROCEDURE — 80053 COMPREHEN METABOLIC PANEL: CPT | Performed by: INTERNAL MEDICINE

## 2024-07-15 PROCEDURE — 85027 COMPLETE CBC AUTOMATED: CPT | Performed by: INTERNAL MEDICINE

## 2024-07-15 PROCEDURE — 82103 ALPHA-1-ANTITRYPSIN TOTAL: CPT | Performed by: INTERNAL MEDICINE

## 2024-07-15 PROCEDURE — 83690 ASSAY OF LIPASE: CPT | Performed by: INTERNAL MEDICINE

## 2024-07-15 PROCEDURE — 99239 HOSP IP/OBS DSCHRG MGMT >30: CPT | Performed by: PHYSICIAN ASSISTANT

## 2024-07-15 PROCEDURE — 83735 ASSAY OF MAGNESIUM: CPT | Performed by: INTERNAL MEDICINE

## 2024-07-15 RX ORDER — ACETAMINOPHEN 325 MG/1
650 TABLET ORAL EVERY 6 HOURS PRN
Start: 2024-07-15

## 2024-07-15 RX ORDER — ACETAMINOPHEN 325 MG/1
650 TABLET ORAL EVERY 6 HOURS PRN
Status: DISCONTINUED | OUTPATIENT
Start: 2024-07-15 | End: 2024-07-15 | Stop reason: HOSPADM

## 2024-07-15 RX ORDER — FAMOTIDINE 40 MG/1
40 TABLET, FILM COATED ORAL DAILY
Start: 2024-07-15

## 2024-07-15 RX ORDER — ACETAMINOPHEN 325 MG/1
650 TABLET ORAL EVERY 6 HOURS PRN
Status: DISCONTINUED | OUTPATIENT
Start: 2024-07-15 | End: 2024-07-15

## 2024-07-15 RX ADMIN — ENOXAPARIN SODIUM 40 MG: 40 INJECTION SUBCUTANEOUS at 08:55

## 2024-07-15 RX ADMIN — ONDANSETRON 4 MG: 2 INJECTION INTRAMUSCULAR; INTRAVENOUS at 02:34

## 2024-07-15 RX ADMIN — LORATADINE 10 MG: 10 TABLET ORAL at 08:55

## 2024-07-15 RX ADMIN — OMEPRAZOLE 40 MG: 20 CAPSULE, DELAYED RELEASE ORAL at 06:19

## 2024-07-15 RX ADMIN — SODIUM CHLORIDE, SODIUM LACTATE, POTASSIUM CHLORIDE, AND CALCIUM CHLORIDE 150 ML/HR: .6; .31; .03; .02 INJECTION, SOLUTION INTRAVENOUS at 06:18

## 2024-07-15 RX ADMIN — MORPHINE SULFATE 4 MG: 4 INJECTION INTRAVENOUS at 02:34

## 2024-07-15 RX ADMIN — ACETAMINOPHEN 650 MG: 325 TABLET, FILM COATED ORAL at 08:55

## 2024-07-15 NOTE — UTILIZATION REVIEW
Initial Clinical Review    Admission: Date/Time/Statement:   Admission Orders (From admission, onward)       Ordered        07/14/24 1409  Place in Observation  Once                          Orders Placed This Encounter   Procedures    Place in Observation     Standing Status:   Standing     Number of Occurrences:   1     Order Specific Question:   Level of Care     Answer:   Med Surg [16]     ED Arrival Information       Expected   -    Arrival   7/14/2024 09:19    Acuity   Urgent              Means of arrival   Walk-In    Escorted by   Spouse    Service   Hospitalist    Admission type   Emergency              Arrival complaint   upper abd pain/back pain             Chief Complaint   Patient presents with    Abdominal Pain     Pt reports abd pain x 1.5 days -N/V/D. Hx of similar pain dx with pancreatitis        Initial Presentation: 58 y.o. male  PMH of pancreatitis, GERD, prior PE, and mild intermittent asthma who presents to ED from home  with abdominal pain x 2 days  . Pain is poorly localized although possibly more towards the upper abdomen but extends all the way down into the lower quadrants as well. The patient's pain also radiates to the back bilaterally more in the flank region . Pt does have some increased bloating . Pt felt feverish x 2 at home and one episode of feeling cold . Does not follows low fat diet . On exam, BP elevated , abdomen tender to palpation and percussion  , abdomen soft, non distended.  Labs :lipase 457. CT A/P suspicious for acute pancreatitis. Pt given IV analgesics, IV antiemetic in ED. Admitted as OBS with idiopathic acute pancreatitis. Plan- IVF. CL diet. PRN antiemetics. Pain control. Monitro BP. PRN IV Hydralazine .   Anticipated Length of Stay/Certification Statement: Patient will be admitted on an observation basis with an anticipated length of stay of less than 2 midnights secondary to treatment of acute pancreatitis..     Date: 7/15    Lipase down to 187 today.WBC up to  10.84 today  IVF infusing . Receiving prn IV antiemetics. Diet advanced to GI lo fat diet     ED Triage Vitals [07/14/24 0930]   Temperature Pulse Respirations Blood Pressure SpO2 Pain Score   98 °F (36.7 °C) 57 18 (!) 178/96 98 % 8     Weight (last 2 days)       Date/Time Weight    07/14/24 15:42:05 105 (232.14)            Vital Signs (last 3 days)       Date/Time Temp Pulse Resp BP MAP (mmHg) SpO2 O2 Device Patient Position - Orthostatic VS Pain    07/15/24 0855 -- -- -- -- -- -- -- -- 5    07/15/24 07:45:57 98.5 °F (36.9 °C) 67 16 143/79 100 94 % -- -- --    07/15/24 0234 -- -- -- -- -- -- -- -- 8    07/14/24 23:59:57 98.6 °F (37 °C) 78 16 140/78 99 95 % -- -- --    07/14/24 2100 -- -- -- -- -- -- -- -- No Pain    07/14/24 1754 -- -- -- -- -- -- None (Room air) -- No Pain    07/14/24 1620 -- -- -- -- -- -- -- -- 5 07/14/24 1555 -- -- -- -- -- -- -- -- 5 07/14/24 15:42:05 97.5 °F (36.4 °C) 55 16 146/88 107 97 % None (Room air) Lying --    07/14/24 1250 -- 62 16 151/72 -- 98 % None (Room air) Sitting --    07/14/24 0930 98 °F (36.7 °C) 57 18 178/96 -- 98 % None (Room air) Sitting 8              Pertinent Labs/Diagnostic Test Results:   Radiology:  CT abdomen pelvis with contrast   Final Interpretation by Lon Black MD (07/14 1244)      Inflammatory stranding adjacent to the head of the pancreas suspicious for acute pancreatitis. No CT evidence of pancreatic necrosis or peripancreatic fluid collection.      The study was marked in EPIC for immediate notification.         Workstation performed: [id number]           Cardiology:  No orders to display     GI:  No orders to display           Results from last 7 days   Lab Units 07/15/24  0438 07/14/24  0948   WBC Thousand/uL 10.84* 8.73   HEMOGLOBIN g/dL 14.1 15.7   HEMATOCRIT % 43.3 49.0   PLATELETS Thousands/uL 276 326   TOTAL NEUT ABS Thousands/µL  --  5.88         Results from last 7 days   Lab Units 07/15/24  0438 07/14/24  0948   SODIUM mmol/L  137 136   POTASSIUM mmol/L 3.9 3.9   CHLORIDE mmol/L 103 102   CO2 mmol/L 29 29   ANION GAP mmol/L 5 5   BUN mg/dL 9 11   CREATININE mg/dL 0.90 1.04   EGFR ml/min/1.73sq m 93 78   CALCIUM mg/dL 8.9 9.4   MAGNESIUM mg/dL 2.0  --      Results from last 7 days   Lab Units 07/15/24  0438 07/14/24  0948   AST U/L 13 18   ALT U/L 20 27   ALK PHOS U/L 56 66   TOTAL PROTEIN g/dL 6.2* 7.4   ALBUMIN g/dL 3.7 4.3   TOTAL BILIRUBIN mg/dL 0.61 0.65         Results from last 7 days   Lab Units 07/15/24  0438 07/14/24  0948   GLUCOSE RANDOM mg/dL 116 120             Results from last 7 days   Lab Units 07/15/24  0438 07/14/24  0948   LIPASE u/L 187* 457*             ED Treatment-Medication Administration from 07/14/2024 0918 to 07/14/2024 1532         Date/Time Order Dose Route Action     07/14/2024 0946 morphine injection 6 mg 6 mg Intravenous Given     07/14/2024 1112 ondansetron (ZOFRAN) injection 4 mg 4 mg Intravenous Given     07/14/2024 1135 iohexol (OMNIPAQUE) 350 MG/ML injection (MULTI-DOSE) 80 mL 80 mL Intravenous Given     07/14/2024 1328 ketorolac (TORADOL) injection 15 mg 15 mg Intravenous Given            Past Medical History:   Diagnosis Date    Achilles tendon tear     Allergic     Asthma     Cancer (HCC)     skin cancer    Chronic back pain     GERD (gastroesophageal reflux disease)     Pancreatitis     Pulmonary embolism (HCC)     Spinal cord injury     lumbar and cervical    Umbilical hernia      Present on Admission:   Idiopathic acute pancreatitis without infection or necrosis      Admitting Diagnosis: Pancreatitis [K85.90]  Upper abdominal pain [R10.10]  Age/Sex: 58 y.o. male  Admission Orders:  Scheduled Medications:  azelastine, 1 spray, Each Nare, BID  enoxaparin, 40 mg, Subcutaneous, Daily  EPINEPHrine, 0.3 mg, Intramuscular, Once  famotidine, 40 mg, Oral, BID  fluticasone-umeclidinium-vilanterol, 1 puff, Inhalation, Daily  loratadine, 10 mg, Oral, Daily  montelukast, 10 mg, Oral, HS  omeprazole, 40 mg,  Oral, BID AC    metoclopramide (REGLAN) injection 10 mg  Dose: 10 mg  Freq: Once Route: IV x1 7/124 @ 1944      Continuous IV Infusions:  lactated ringers, 150 mL/hr, Intravenous, Continuous      PRN Meds:  acetaminophen, 650 mg, Oral, Q6H PRN x1 7/15   albuterol, 2.5 mg, Nebulization, Q6H PRN x1 7/14   diazepam, 5 mg, Oral, Q12H PRN  hydrALAZINE, 10 mg, Intravenous, Q6H PRN  ketorolac, 15 mg, Intravenous, Q6H PRN  morphine injection, 4 mg, Intravenous, Q4H PRN  x1 7/15   ondansetron, 4 mg, Intravenous, Q6H PRN x1 7/14, x1 7/15   oxyCODONE, 2.5 mg, Oral, Q4H PRN x1 7/14       CL diet--->GI lo fat    SCD   OOB as keisha      Network Utilization Review Department  ATTENTION: Please call with any questions or concerns to [phone] and carefully listen to the prompts so that you are directed to the right person. All voicemails are confidential.   For Discharge needs, contact Care Management DC Support Team at [phone] opt. 2  Send all requests for admission clinical reviews, approved or denied determinations and any other requests to dedicated fax number below belonging to the campus where the patient is receiving treatment. List of dedicated fax numbers for the Facilities:  FACILITY NAME UR FAX NUMBER   ADMISSION DENIALS (Administrative/Medical Necessity) [phone]   DISCHARGE SUPPORT TEAM (NETWORK) [phone]   PARENT CHILD HEALTH (Maternity/NICU/Pediatrics) [phone]   [facility] [phone]   [facility] [phone]   [facility] [phone]   [facility] [phone]   [facility] [phone]   [facility] [phone]   [facility] [phone]   [facility] [phone]   [facility] [phone]   [facility] [phone]   [facility]  [facility] [phone]   [facility] [phone]

## 2024-07-15 NOTE — PLAN OF CARE
Problem: PAIN - ADULT  Goal: Verbalizes/displays adequate comfort level or baseline comfort level  Description: Interventions:  - Encourage patient to monitor pain and request assistance  - Assess pain using appropriate pain scale  - Administer analgesics based on type and severity of pain and evaluate response  - Implement non-pharmacological measures as appropriate and evaluate response  - Consider cultural and social influences on pain and pain management  - Notify physician/advanced practitioner if interventions unsuccessful or patient reports new pain  7/15/2024 1024 by Rich Page LPN  Outcome: Progressing  7/15/2024 1024 by Rich Page LPN  Outcome: Progressing  7/15/2024 1024 by Rich Page LPN  Outcome: Progressing     Problem: INFECTION - ADULT  Goal: Absence or prevention of progression during hospitalization  Description: INTERVENTIONS:  - Assess and monitor for signs and symptoms of infection  - Monitor lab/diagnostic results  - Monitor all insertion sites, i.e. indwelling lines, tubes, and drains  - Monitor endotracheal if appropriate and nasal secretions for changes in amount and color  - [place] appropriate cooling/warming therapies per order  - Administer medications as ordered  - Instruct and encourage patient and family to use good hand hygiene technique  - Identify and instruct in appropriate isolation precautions for identified infection/condition  7/15/2024 1024 by Rich Page LPN  Outcome: Progressing  7/15/2024 1024 by Rich Page LPN  Outcome: Progressing  7/15/2024 1024 by Rich Page LPN  Outcome: Progressing  Goal: Absence of fever/infection during neutropenic period  Description: INTERVENTIONS:  - Monitor WBC    7/15/2024 1024 by Rich Page LPN  Outcome: Progressing  7/15/2024 1024 by Rich Page LPN  Outcome: Progressing  7/15/2024 1024 by Rich Page LPN  Outcome: Progressing     Problem: SAFETY ADULT  Goal: Patient will remain free of  falls  Description: INTERVENTIONS:  - Educate patient/family on patient safety including physical limitations  - Instruct patient to call for assistance with activity   - Consult OT/PT to assist with strengthening/mobility   - Keep Call bell within reach  - Keep bed low and locked with side rails adjusted as appropriate  - Keep care items and personal belongings within reach  - Initiate and maintain comfort rounds  - Make Fall Risk Sign visible to staff  - Offer Toileting every 2 Hours, in advance of need  - Apply yellow socks and bracelet for high fall risk patients  - Consider moving patient to room near nurses station  7/15/2024 1024 by Rich Page LPN  Outcome: Progressing  7/15/2024 1024 by Rich Page LPN  Outcome: Progressing  7/15/2024 1024 by Rich Page LPN  Outcome: Progressing  Goal: Maintain or return to baseline ADL function  Description: INTERVENTIONS:  -  Assess patient's ability to carry out ADLs; assess patient's baseline for ADL function and identify physical deficits which impact ability to perform ADLs (bathing, care of mouth/teeth, toileting, grooming, dressing, etc.)  - Assess/evaluate cause of self-care deficits   - Assess range of motion  - Assess patient's mobility; develop plan if impaired  - Assess patient's need for assistive devices and provide as appropriate  - Encourage maximum independence but intervene and supervise when necessary  - Involve family in performance of ADLs  - Assess for home care needs following discharge   - Consider OT consult to assist with ADL evaluation and planning for discharge  - Provide patient education as appropriate  7/15/2024 1024 by Rich Page LPN  Outcome: Progressing  7/15/2024 1024 by Rich Page LPN  Outcome: Progressing  7/15/2024 1024 by Rich Page LPN  Outcome: Progressing  Goal: Maintains/Returns to pre admission functional level  Description: INTERVENTIONS:  - Perform AM-PAC 6 Click Basic Mobility/ Daily Activity  assessment daily.  - Set and communicate daily mobility goal to care team and patient/family/caregiver.   - Collaborate with rehabilitation services on mobility goals if consulted  - Perform Range of Motion 4 times a day.  - Reposition patient every 2 hours.  - Dangle patient 3 times a day  - Stand patient 3 times a day  - Ambulate patient 3 times a day  - Out of bed to chair 3 times a day   - Out of bed for meals 3 times a day  - Out of bed for toileting  - Record patient progress and toleration of activity level   7/15/2024 1024 by Rich Page LPN  Outcome: Progressing  7/15/2024 1024 by Rich Page LPN  Outcome: Progressing  7/15/2024 1024 by Rich Page LPN  Outcome: Progressing     Problem: DISCHARGE PLANNING  Goal: Discharge to home or other facility with appropriate resources  Description: INTERVENTIONS:  - Identify barriers to discharge w/patient and caregiver  - Arrange for needed discharge resources and transportation as appropriate  - Identify discharge learning needs (meds, wound care, etc.)  - Arrange for interpretive services to assist at discharge as needed  - Refer to Case Management Department for coordinating discharge planning if the patient needs post-hospital services based on physician/advanced practitioner order or complex needs related to functional status, cognitive ability, or social support system  7/15/2024 1024 by Rich Page LPN  Outcome: Progressing  7/15/2024 1024 by Rich Page LPN  Outcome: Progressing  7/15/2024 1024 by Rich Page LPN  Outcome: Progressing     Problem: Knowledge Deficit  Goal: Patient/family/caregiver demonstrates understanding of disease process, treatment plan, medications, and discharge instructions  Description: Complete learning assessment and assess knowledge base.  Interventions:  - Provide teaching at level of understanding  - Provide teaching via preferred learning methods  7/15/2024 1024 by Rich Page LPN  Outcome:  Progressing  7/15/2024 1024 by Rich Page LPN  Outcome: Progressing  7/15/2024 1024 by Rich Page LPN  Outcome: Progressing

## 2024-07-15 NOTE — DISCHARGE INSTR - AVS FIRST PAGE
Dear Donato Adler,     It was our pleasure to care for you here at [facility].  It is our hope that we were always able to exceed the expected standards for your care during your stay.  You were hospitalized due to pancreatitis.  You were cared for on the West 4th floor by Serina Singh PA-C under the service of Carly Mendoza MD with the [facility] who covers for your primary care physician (PCP), Rich Casas DO, while you were hospitalized.  If you have any questions or concerns related to this hospitalization, you may contact us at .  For follow up as well as any medication refills, we recommend that you follow up with your primary care physician.  A registered nurse will reach out to you by phone within a few days after your discharge to answer any additional questions that you may have after going home.  However, at this time we provide for you here, the most important instructions / recommendations at discharge:     Notable Medication Adjustments -   none  Important follow up information -   Follow up with GI at [facility]  Other Instructions -   Low fat diet  Please review this entire after visit summary as additional general instructions including medication list, appointments, activity, diet, any pertinent wound care, and other additional recommendations from your care team that may be provided for you.    Sincerely,   Serina Singh PA-C

## 2024-07-15 NOTE — DISCHARGE SUMMARY
Discharge Summary - [facility] Internal Medicine    Patient Information: Donato Adler 58 y.o. male MRN: [id number]  Unit/Bed#: W -01 Encounter: [id number]    Discharging Physician / Practitioner: Serina Singh PA-C  PCP: Rich Casas DO  Admission Date:   Admission Orders (From admission, onward)       Ordered        07/14/24 1409  Place in Observation  Once                          Discharge Date: 07/15/24    Reason for Admission: abdominal pain    Discharge Diagnoses:     Principal Problem:    Idiopathic acute pancreatitis without infection or necrosis  Active Problems:    Elevated blood pressure reading    Mild intermittent asthma    GERD (gastroesophageal reflux disease)    History of anaphylaxis  Resolved Problems:    * No resolved hospital problems. *      Consultations During Hospital Stay:  none    Procedures Performed:     none    Significant Findings / Test Results:     CT: Inflammatory stranding adjacent to the head of the pancreas suspicious for acute pancreatitis. No CT evidence of pancreatic necrosis or peripancreatic fluid collection.     Incidental Findings:   none     Test Results Pending at Discharge (will require follow up):   Alpha-1 antitrypsin     Outpatient Tests Requested:  Follow up with GI    Complications:  none    Hospital Course:     Donato Adler is a 58 y.o. male patient who originally presented to the hospital on 7/14/2024 due to abdominal pain.  Patient has past medical history of multiple episodes of acute pancreatitis for which she follows with GI at [facility].  He presented with abdominal pain for 2 days.  There was no urinary symptoms.  Sometimes it would extend to the back however sometimes just throughout the abdomen.  The patient was found to have a CAT scan showing acute pancreatitis.  Lipase was 457 and improved to 187 with IV fluids.  Patient was started on a clear liquid diet and advance to low-fat and tolerated diet well.  He was noted to have improvement in  "pain.  LFTs were within normal limits.  Glyceride level not elevated with no alcohol use.  Felt to potentially be omeprazole versus salicylic acid induced.  Patient advised to follow-up with his GI physician.  Alpha-1 antitrypsin level still pending at time of discharge.    Condition at Discharge: stable     Discharge Day Visit / Exam:     Subjective:  feeling well. Pain improved. Wants to go home ASAP. Mild discomfort but not worse with eating/drinking.  Vitals: Blood Pressure: 143/79 (07/15/24 0745)  Pulse: 67 (07/15/24 0745)  Temperature: 98.5 °F (36.9 °C) (07/15/24 0745)  Temp Source: Oral (07/14/24 1542)  Respirations: 16 (07/15/24 0745)  Height: 5' 9\" (175.3 cm) (07/14/24 1542)  Weight - Scale: 105 kg (232 lb 2.3 oz) (07/14/24 1542)  SpO2: 94 % (07/15/24 0745)  Exam:   Physical Exam  Vitals and nursing note reviewed.   Constitutional:       General: He is not in acute distress.     Appearance: Normal appearance. He is obese. He is not diaphoretic.   HENT:      Head: Normocephalic and atraumatic.   Cardiovascular:      Rate and Rhythm: Normal rate and regular rhythm.   Pulmonary:      Effort: Pulmonary effort is normal.      Breath sounds: Normal breath sounds. No stridor. No wheezing, rhonchi or rales.   Abdominal:      General: Bowel sounds are normal.      Palpations: Abdomen is soft.      Tenderness: There is no abdominal tenderness. There is no guarding.      Comments: Not tender on exam   Musculoskeletal:      Right lower leg: No edema.      Left lower leg: No edema.   Skin:     General: Skin is warm and dry.   Neurological:      Mental Status: He is alert.   Psychiatric:         Mood and Affect: Mood normal.         Behavior: Behavior normal.         Discussion with Family: declined call to family    Discharge instructions/Information to patient and family:   See after visit summary for information provided to patient and family.      Provisions for Follow-Up Care:  See after visit summary for " information related to follow-up care and any pertinent home health orders.      Disposition:     Home    Planned Readmission: no     Discharge Statement:  I spent 43 minutes discharging the patient. This time was spent on the day of discharge. I had direct contact with the patient on the day of discharge. Greater than 50% of the total time was spent examining patient, answering all patient questions, arranging and discussing plan of care with patient as well as directly providing post-discharge instructions.  Additional time then spent on discharge activities.    Discharge Medications:  See after visit summary for reconciled discharge medications provided to patient and family.      ** Please Note: This note has been constructed using a voice recognition system **

## 2024-07-15 NOTE — PLAN OF CARE
Problem: PAIN - ADULT  Goal: Verbalizes/displays adequate comfort level or baseline comfort level  Description: Interventions:  - Encourage patient to monitor pain and request assistance  - Assess pain using appropriate pain scale  - Administer analgesics based on type and severity of pain and evaluate response  - Implement non-pharmacological measures as appropriate and evaluate response  - Consider cultural and social influences on pain and pain management  - Notify physician/advanced practitioner if interventions unsuccessful or patient reports new pain  Outcome: Progressing     Problem: INFECTION - ADULT  Goal: Absence or prevention of progression during hospitalization  Description: INTERVENTIONS:  - Assess and monitor for signs and symptoms of infection  - Monitor lab/diagnostic results  - Monitor all insertion sites, i.e. indwelling lines, tubes, and drains  - Monitor endotracheal if appropriate and nasal secretions for changes in amount and color  - [place] appropriate cooling/warming therapies per order  - Administer medications as ordered  - Instruct and encourage patient and family to use good hand hygiene technique  - Identify and instruct in appropriate isolation precautions for identified infection/condition  Outcome: Progressing  Goal: Absence of fever/infection during neutropenic period  Description: INTERVENTIONS:  - Monitor WBC    Outcome: Progressing     Problem: SAFETY ADULT  Goal: Patient will remain free of falls  Description: INTERVENTIONS:  - Educate patient/family on patient safety including physical limitations  - Instruct patient to call for assistance with activity   - Consult OT/PT to assist with strengthening/mobility   - Keep Call bell within reach  - Keep bed low and locked with side rails adjusted as appropriate  - Keep care items and personal belongings within reach  - Initiate and maintain comfort rounds  - Make Fall Risk Sign visible to staff  - Apply yellow socks and bracelet  for high fall risk patients  - Consider moving patient to room near nurses station  Outcome: Progressing  Goal: Maintain or return to baseline ADL function  Description: INTERVENTIONS:  -  Assess patient's ability to carry out ADLs; assess patient's baseline for ADL function and identify physical deficits which impact ability to perform ADLs (bathing, care of mouth/teeth, toileting, grooming, dressing, etc.)  - Assess/evaluate cause of self-care deficits   - Assess range of motion  - Assess patient's mobility; develop plan if impaired  - Assess patient's need for assistive devices and provide as appropriate  - Encourage maximum independence but intervene and supervise when necessary  - Involve family in performance of ADLs  - Assess for home care needs following discharge   - Consider OT consult to assist with ADL evaluation and planning for discharge  - Provide patient education as appropriate  Outcome: Progressing  Goal: Maintains/Returns to pre admission functional level  Description: INTERVENTIONS:  - Perform AM-PAC 6 Click Basic Mobility/ Daily Activity assessment daily.  - Set and communicate daily mobility goal to care team and patient/family/caregiver.   - Collaborate with rehabilitation services on mobility goals if consulted  - Out of bed for toileting  - Record patient progress and toleration of activity level   Outcome: Progressing     Problem: DISCHARGE PLANNING  Goal: Discharge to home or other facility with appropriate resources  Description: INTERVENTIONS:  - Identify barriers to discharge w/patient and caregiver  - Arrange for needed discharge resources and transportation as appropriate  - Identify discharge learning needs (meds, wound care, etc.)  - Arrange for interpretive services to assist at discharge as needed  - Refer to Case Management Department for coordinating discharge planning if the patient needs post-hospital services based on physician/advanced practitioner order or complex needs  related to functional status, cognitive ability, or social support system  Outcome: Progressing     Problem: Knowledge Deficit  Goal: Patient/family/caregiver demonstrates understanding of disease process, treatment plan, medications, and discharge instructions  Description: Complete learning assessment and assess knowledge base.  Interventions:  - Provide teaching at level of understanding  - Provide teaching via preferred learning methods  Outcome: Progressing

## 2024-07-16 LAB — A1AT SERPL-MCNC: 159 MG/DL (ref 101–187)

## 2024-10-20 ENCOUNTER — HOSPITAL ENCOUNTER (EMERGENCY)
Facility: HOSPITAL | Age: 59
Discharge: HOME | End: 2024-10-20
Attending: EMERGENCY MEDICINE
Payer: COMMERCIAL

## 2024-10-20 VITALS
SYSTOLIC BLOOD PRESSURE: 146 MMHG | DIASTOLIC BLOOD PRESSURE: 78 MMHG | HEART RATE: 93 BPM | OXYGEN SATURATION: 92 % | TEMPERATURE: 97.8 F | RESPIRATION RATE: 22 BRPM

## 2024-10-20 DIAGNOSIS — Z87.892 HISTORY OF ANAPHYLAXIS: ICD-10-CM

## 2024-10-20 DIAGNOSIS — T78.2XXA ANAPHYLAXIS, INITIAL ENCOUNTER: Primary | ICD-10-CM

## 2024-10-20 PROCEDURE — 99285 EMERGENCY DEPT VISIT HI MDM: CPT | Performed by: EMERGENCY MEDICINE

## 2024-10-20 PROCEDURE — 96374 THER/PROPH/DIAG INJ IV PUSH: CPT

## 2024-10-20 PROCEDURE — 96372 THER/PROPH/DIAG INJ SC/IM: CPT

## 2024-10-20 PROCEDURE — 99285 EMERGENCY DEPT VISIT HI MDM: CPT

## 2024-10-20 PROCEDURE — 96375 TX/PRO/DX INJ NEW DRUG ADDON: CPT

## 2024-10-20 PROCEDURE — 94760 N-INVAS EAR/PLS OXIMETRY 1: CPT

## 2024-10-20 PROCEDURE — 94640 AIRWAY INHALATION TREATMENT: CPT

## 2024-10-20 PROCEDURE — 93005 ELECTROCARDIOGRAM TRACING: CPT

## 2024-10-20 RX ORDER — EPINEPHRINE 1 MG/ML
INJECTION, SOLUTION, CONCENTRATE INTRAVENOUS
Status: DISCONTINUED
Start: 2024-10-20 | End: 2024-10-20 | Stop reason: HOSPADM

## 2024-10-20 RX ORDER — DIPHENHYDRAMINE HYDROCHLORIDE 50 MG/ML
25 INJECTION INTRAMUSCULAR; INTRAVENOUS ONCE
Status: COMPLETED | OUTPATIENT
Start: 2024-10-20 | End: 2024-10-20

## 2024-10-20 RX ORDER — EPINEPHRINE 0.3 MG/.3ML
0.3 INJECTION SUBCUTANEOUS ONCE
Qty: 2 EACH | Refills: 0 | Status: SHIPPED | OUTPATIENT
Start: 2024-10-20 | End: 2024-10-20

## 2024-10-20 RX ORDER — SODIUM CHLORIDE FOR INHALATION 0.9 %
12 VIAL, NEBULIZER (ML) INHALATION ONCE
Status: COMPLETED | OUTPATIENT
Start: 2024-10-20 | End: 2024-10-20

## 2024-10-20 RX ORDER — METHYLPREDNISOLONE SODIUM SUCCINATE 125 MG/2ML
125 INJECTION, POWDER, LYOPHILIZED, FOR SOLUTION INTRAMUSCULAR; INTRAVENOUS ONCE
Status: COMPLETED | OUTPATIENT
Start: 2024-10-20 | End: 2024-10-20

## 2024-10-20 RX ORDER — EPINEPHRINE 1 MG/ML
0.3 INJECTION, SOLUTION, CONCENTRATE INTRAVENOUS ONCE
Status: COMPLETED | OUTPATIENT
Start: 2024-10-20 | End: 2024-10-20

## 2024-10-20 RX ORDER — EPINEPHRINE 0.3 MG/.3ML
0.3 INJECTION SUBCUTANEOUS ONCE
Qty: 2 EACH | Refills: 0 | OUTPATIENT
Start: 2024-10-20 | End: 2024-10-20

## 2024-10-20 RX ORDER — EPINEPHRINE 1 MG/ML
0.5 INJECTION, SOLUTION, CONCENTRATE INTRAVENOUS ONCE AS NEEDED
Status: DISCONTINUED | OUTPATIENT
Start: 2024-10-20 | End: 2024-10-20 | Stop reason: HOSPADM

## 2024-10-20 RX ORDER — ALBUTEROL SULFATE 5 MG/ML
10 SOLUTION RESPIRATORY (INHALATION) ONCE
Status: COMPLETED | OUTPATIENT
Start: 2024-10-20 | End: 2024-10-20

## 2024-10-20 RX ORDER — IPRATROPIUM BROMIDE AND ALBUTEROL SULFATE 2.5; .5 MG/3ML; MG/3ML
3 SOLUTION RESPIRATORY (INHALATION) ONCE
Status: DISCONTINUED | OUTPATIENT
Start: 2024-10-20 | End: 2024-10-20

## 2024-10-20 RX ORDER — EPINEPHRINE 1 MG/ML
INJECTION, SOLUTION, CONCENTRATE INTRAVENOUS
Status: COMPLETED
Start: 2024-10-20 | End: 2024-10-20

## 2024-10-20 RX ORDER — FAMOTIDINE 10 MG/ML
20 INJECTION, SOLUTION INTRAVENOUS ONCE
Status: COMPLETED | OUTPATIENT
Start: 2024-10-20 | End: 2024-10-20

## 2024-10-20 RX ORDER — ALBUTEROL SULFATE 90 UG/1
2 INHALANT RESPIRATORY (INHALATION) ONCE
Status: COMPLETED | OUTPATIENT
Start: 2024-10-20 | End: 2024-10-20

## 2024-10-20 RX ADMIN — Medication: at 13:01

## 2024-10-20 RX ADMIN — IPRATROPIUM BROMIDE 1 MG: 0.5 SOLUTION RESPIRATORY (INHALATION) at 13:03

## 2024-10-20 RX ADMIN — ALBUTEROL SULFATE 2 PUFF: 90 AEROSOL, METERED RESPIRATORY (INHALATION) at 13:16

## 2024-10-20 RX ADMIN — DIPHENHYDRAMINE HYDROCHLORIDE 25 MG: 50 INJECTION, SOLUTION INTRAMUSCULAR; INTRAVENOUS at 13:04

## 2024-10-20 RX ADMIN — ISODIUM CHLORIDE 12 ML: 0.03 SOLUTION RESPIRATORY (INHALATION) at 13:12

## 2024-10-20 RX ADMIN — EPINEPHRINE: 1 INJECTION, SOLUTION, CONCENTRATE INTRAVENOUS at 13:01

## 2024-10-20 RX ADMIN — METHYLPREDNISOLONE SODIUM SUCCINATE 125 MG: 125 INJECTION, POWDER, FOR SOLUTION INTRAMUSCULAR; INTRAVENOUS at 13:04

## 2024-10-20 RX ADMIN — ALBUTEROL SULFATE 10 MG: 2.5 SOLUTION RESPIRATORY (INHALATION) at 13:12

## 2024-10-20 RX ADMIN — FAMOTIDINE 20 MG: 10 INJECTION INTRAVENOUS at 13:04

## 2024-10-20 NOTE — ED PROVIDER NOTES
Time reflects when diagnosis was documented in both MDM as applicable and the Disposition within this note       Time User Action Codes Description Comment    10/20/2024  2:09 PM Igor Levy Add [Z87.892] History of anaphylaxis     10/20/2024  2:09 PM Igor Levy Add [T78.2XXA] Anaphylaxis, initial encounter     10/20/2024  3:06 PM Igor Levy Modify [Z87.892] History of anaphylaxis     10/20/2024  3:06 PM Igor Levy Modify [T78.2XXA] Anaphylaxis, initial encounter           ED Disposition       ED Disposition   Discharge    Condition   Stable    Date/Time   Sun Oct 20, 2024  3:06 PM    Comment   Donato Adler discharge to home/self care.                   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  Patient provided with 0.5 mg of epinephrine with instantaneous improvement of symptoms.  Prior to administration saturating 98% on room air.  Patient provided with albuterol inhaler prior to heart neb.  Pepcid Benadryl Solu-Medrol also provided.  Patient had no recurrence.  Prescription for EpiPen provided.  Patient appears well, nontoxic, agrees with plan of care at this time.  Answered all questions.  In light of this, patient would benefit from outpatient follow-up.    Risk  Prescription drug management.             Medications   EPINEPHrine PF (ADRENALIN) 1 mg/mL injection **ADS Override Pull** (0 mg  Hold 10/20/24 1318)   EPINEPHrine PF (ADRENALIN) 1 mg/mL injection 0.5 mg (has no administration in time range)   EPINEPHrine PF (ADRENALIN) 1 mg/mL injection 0.3 mg ( Intramuscular Given 10/20/24 1301)   diphenhydrAMINE (BENADRYL) injection 25 mg (25 mg Intravenous Given 10/20/24 1304)   Famotidine (PF) (PEPCID) injection 20 mg (20 mg Intravenous Given 10/20/24 1304)   methylPREDNISolone sodium succinate (Solu-MEDROL) injection 125 mg (125 mg Intravenous Given  10/20/24 1304)   albuterol inhalation solution 10 mg (10 mg Nebulization Given 10/20/24 1312)   ipratropium (ATROVENT) 0.02 % inhalation solution 1 mg (1 mg Nebulization Given 10/20/24 1303)   sodium chloride 0.9 % inhalation solution 12 mL (12 mL Nebulization Given 10/20/24 1312)   albuterol (PROVENTIL HFA,VENTOLIN HFA) inhaler 2 puff (2 puffs Inhalation Given 10/20/24 1316)       ED Risk Strat Scores                                               History of Present Illness       Chief Complaint   Patient presents with    Shortness of Breath     Pt arrives unable to speak SOB with epi pen in hand        Past Medical History:   Diagnosis Date    Achilles tendon tear     Allergic     Asthma     Cancer (HCC)     skin cancer    Chronic back pain     GERD (gastroesophageal reflux disease)     Pancreatitis     Pulmonary embolism (HCC)     Spinal cord injury     lumbar and cervical    Umbilical hernia       Past Surgical History:   Procedure Laterality Date    COLONOSCOPY  2019    [facility]    SHOULDER SURGERY      3 times - has pins and anchors    SINUS SURGERY  10/2023      Family History   Problem Relation Age of Onset    Heart disease Paternal Grandmother       Social History     Tobacco Use    Smoking status: Never    Smokeless tobacco: Never   Vaping Use    Vaping status: Never Used   Substance Use Topics    Alcohol use: Not Currently     Comment: Occasional    Drug use: No      E-Cigarette/Vaping    E-Cigarette Use Never User       E-Cigarette/Vaping Substances    Nicotine No     THC No     CBD No     Flavoring No     Other No     Unknown No       I have reviewed and agree with the history as documented.     (Donato Adler) Donato Adler is a 59 y.o. male     They presented to the emergency department on October 20, 2024. Patient presents with:  Shortness of Breath: Pt arrives unable to speak SOB with epi pen in hand.      Patient seen and examined immediately upon arrival, audible stridor, epinephrine injector  and hand, when asked if he has anaphylaxis and use this EpiPen patient nodding.    Patient administered epinephrine, with instantaneous improvement of breathing as well as stridor.    The patient states that he was in [employer], is allergic to soaps, deodorants, perfumes, hygiene products, and began feeling symptoms believes that woman's perfume caused his symptoms to be aggravated.  Patient notes that he attempted to use his rescue inhaler 8 times however had no effect.  Patient then used his EpiPen, however is unsure if it made it through his jeans.  Patient then came to the emergency department for evaluation.  Patient requesting triple nebulizer treatment as well as steroids.        Review of Systems   Constitutional:  Negative for chills and fever.   HENT:  Negative for ear pain and sore throat.    Eyes:  Negative for pain and visual disturbance.   Respiratory:  Positive for shortness of breath. Negative for cough.    Cardiovascular:  Negative for chest pain and palpitations.   Gastrointestinal:  Negative for abdominal pain and vomiting.   Genitourinary:  Negative for dysuria and hematuria.   Musculoskeletal:  Negative for arthralgias and back pain.   Skin:  Negative for color change and rash.   Neurological:  Negative for seizures and syncope.   All other systems reviewed and are negative.          Objective       ED Triage Vitals   Temperature Pulse Blood Pressure Respirations SpO2 Patient Position - Orthostatic VS   10/20/24 1317 10/20/24 1301 10/20/24 1301 10/20/24 1301 10/20/24 1301 10/20/24 1301   97.8 °F (36.6 °C) 93 (!) 181/102 (!) 26 100 % Sitting      Temp Source Heart Rate Source BP Location FiO2 (%) Pain Score    10/20/24 1317 10/20/24 1301 10/20/24 1301 -- 10/20/24 1301    Axillary Monitor Right arm  No Pain      Vitals      Date and Time Temp Pulse SpO2 Resp BP Pain Score FACES Pain Rating User   10/20/24 1430 -- 93 92 % 22 146/78 -- -- AW   10/20/24 1330 -- 74 99 % 22 138/74 -- -- AW   10/20/24  1318 -- -- 100 % -- -- -- -- SC   10/20/24 1317 97.8 °F (36.6 °C) -- -- -- -- -- -- AW   10/20/24 1315 -- 85 100 % 26 152/81 -- -- AW   10/20/24 1301 -- 93 100 % 26 181/102 No Pain -- AW            Physical Exam  Vitals and nursing note reviewed.   Constitutional:       General: He is in acute distress (Severe).      Appearance: Normal appearance.   HENT:      Head: Normocephalic and atraumatic.      Right Ear: External ear normal.      Left Ear: External ear normal.      Nose: Nose normal.      Mouth/Throat:      Mouth: Mucous membranes are moist.   Eyes:      Conjunctiva/sclera: Conjunctivae normal.   Cardiovascular:      Rate and Rhythm: Normal rate and regular rhythm.   Pulmonary:      Effort: Tachypnea and respiratory distress present.      Breath sounds: Normal breath sounds. No decreased breath sounds, wheezing, rhonchi or rales.      Comments: 98% on room air  Abdominal:      General: Abdomen is flat. Bowel sounds are normal.      Tenderness: There is no abdominal tenderness. There is no guarding or rebound.   Musculoskeletal:         General: Normal range of motion.      Cervical back: Normal range of motion.   Skin:     General: Skin is warm and dry.      Capillary Refill: Capillary refill takes less than 2 seconds.   Neurological:      Mental Status: He is alert. Mental status is at baseline.   Psychiatric:         Mood and Affect: Mood normal.         Results Reviewed       None            No orders to display       ECG 12 Lead Documentation Only    Date/Time: 10/20/2024 1:22 PM    Performed by: Igor Levy MD  Authorized by: Igor Levy MD    Indications / Diagnosis:  Anaphylaxis  ECG reviewed by me, the ED Provider: yes    Patient location:  ED  Previous ECG:     Previous ECG:  Compared to current    Similarity:  No change  Interpretation:     Interpretation: normal    Rate:     ECG rate:  78    ECG rate assessment: normal    Rhythm:     Rhythm: sinus rhythm    Ectopy:     Ectopy: none     QRS:     QRS axis:  Normal    QRS intervals:  Normal  Conduction:     Conduction: normal    ST segments:     ST segments:  Normal  T waves:     T waves: normal    Comments:      Normal sinus rhythm at 78 bpm, no PACs, no PVCs, no acute ischemic changes.      ED Medication and Procedure Management   Prior to Admission Medications   Prescriptions Last Dose Informant Patient Reported? Taking?   Azelastine HCl 137 MCG/SPRAY SOLN   Yes No   Sig: PLEASE SEE ATTACHED FOR DETAILED DIRECTIONS   EPINEPHrine (EPIPEN) 0.3 mg/0.3 mL SOAJ   No No   Sig: Inject 0.3 mL (0.3 mg total) into a muscle once for 1 dose   Hypertonic Nasal Wash (Sinus Rinse Refill) PACK  Self Yes No   Trelegy Ellipta 100-62.5-25 MCG/INH inhaler  Self Yes No   Sig: Inhale 1 puff daily     acetaminophen (TYLENOL) 325 mg tablet   No No   Sig: Take 2 tablets (650 mg total) by mouth every 6 (six) hours as needed for mild pain, headaches or fever   albuterol (2.5 mg/3 mL) 0.083 % nebulizer solution  Self Yes No   Sig: Take 2.5 mg by nebulization every 6 (six) hours as needed for wheezing or shortness of breath   albuterol (PROVENTIL HFA,VENTOLIN HFA) 90 mcg/act inhaler  Self Yes No   Sig: Inhale 2 puffs every 6 (six) hours as needed for wheezing   cetirizine (ZyrTEC) 10 mg tablet  Self Yes No   Sig: Take 10 mg by mouth daily   diazepam (VALIUM) 5 mg tablet  Self No No   Sig: Take 1 tablet (5 mg total) by mouth every 12 (twelve) hours as needed for muscle spasms for up to 10 days   famotidine (PEPCID) 40 MG tablet   No No   Sig: Take 1 tablet (40 mg total) by mouth in the morning   loratadine (CLARITIN) 10 mg tablet  Self Yes No   Sig: Take 10 mg by mouth daily   montelukast (SINGULAIR) 10 mg tablet  Self Yes No   Sig: Take 10 mg by mouth daily at bedtime   omeprazole (PriLOSEC) 40 MG capsule  Self Yes No   Sig: Take 40 mg by mouth 2 (two) times a day   ondansetron (ZOFRAN-ODT) 4 mg disintegrating tablet   No No   Sig: Take 1 tablet (4 mg total) by mouth every  8 (eight) hours as needed for nausea or vomiting   sildenafil (VIAGRA) 100 mg tablet  Self No No   Sig: Take 1 tablet (100 mg total) by mouth daily as needed for erectile dysfunction      Facility-Administered Medications: None     Discharge Medication List as of 10/20/2024  3:07 PM        CONTINUE these medications which have CHANGED    Details   EPINEPHrine (EPIPEN) 0.3 mg/0.3 mL SOAJ Inject 0.3 mL (0.3 mg total) into a muscle once for 1 dose For severe allergic reaction, Starting Sun 10/20/2024, Normal           CONTINUE these medications which have NOT CHANGED    Details   acetaminophen (TYLENOL) 325 mg tablet Take 2 tablets (650 mg total) by mouth every 6 (six) hours as needed for mild pain, headaches or fever, Starting Mon 7/15/2024, No Print      albuterol (2.5 mg/3 mL) 0.083 % nebulizer solution Take 2.5 mg by nebulization every 6 (six) hours as needed for wheezing or shortness of breath, Historical Med      albuterol (PROVENTIL HFA,VENTOLIN HFA) 90 mcg/act inhaler Inhale 2 puffs every 6 (six) hours as needed for wheezing, Historical Med      Azelastine HCl 137 MCG/SPRAY SOLN PLEASE SEE ATTACHED FOR DETAILED DIRECTIONS, Historical Med      cetirizine (ZyrTEC) 10 mg tablet Take 10 mg by mouth daily, Historical Med      diazepam (VALIUM) 5 mg tablet Take 1 tablet (5 mg total) by mouth every 12 (twelve) hours as needed for muscle spasms for up to 10 days, Starting Mon 10/18/2021, Until Wed 7/5/2023 at 2359, Normal      famotidine (PEPCID) 40 MG tablet Take 1 tablet (40 mg total) by mouth in the morning, Starting Mon 7/15/2024, No Print      Hypertonic Nasal Wash (Sinus Rinse Refill) PACK Starting Thu 11/11/2021, Historical Med      loratadine (CLARITIN) 10 mg tablet Take 10 mg by mouth daily, Historical Med      montelukast (SINGULAIR) 10 mg tablet Take 10 mg by mouth daily at bedtime, Historical Med      omeprazole (PriLOSEC) 40 MG capsule Take 40 mg by mouth 2 (two) times a day, Historical Med       ondansetron (ZOFRAN-ODT) 4 mg disintegrating tablet Take 1 tablet (4 mg total) by mouth every 8 (eight) hours as needed for nausea or vomiting, Starting Wed 7/5/2023, Normal      sildenafil (VIAGRA) 100 mg tablet Take 1 tablet (100 mg total) by mouth daily as needed for erectile dysfunction, Starting Mon 10/18/2021, Normal      Trelegy Ellipta 100-62.5-25 MCG/INH inhaler Inhale 1 puff daily  , Starting Fri 12/17/2021, Historical Med           No discharge procedures on file.  ED SEPSIS DOCUMENTATION   Time reflects when diagnosis was documented in both MDM as applicable and the Disposition within this note       Time User Action Codes Description Comment    10/20/2024  2:09 PM Igor Levy Add [Z87.892] History of anaphylaxis     10/20/2024  2:09 PM Igor Levy Add [T78.2XXA] Anaphylaxis, initial encounter     10/20/2024  3:06 PM Igor Levy Modify [Z87.892] History of anaphylaxis     10/20/2024  3:06 PM Igor Levy Modify [T78.2XXA] Anaphylaxis, initial encounter                  Igor Levy MD  10/20/24 7110

## 2024-10-21 LAB
ATRIAL RATE: 78 BPM
P AXIS: 42 DEGREES
PR INTERVAL: 154 MS
QRS AXIS: 0 DEGREES
QRSD INTERVAL: 92 MS
QT INTERVAL: 362 MS
QTC INTERVAL: 412 MS
T WAVE AXIS: 63 DEGREES
VENTRICULAR RATE: 78 BPM

## 2024-10-21 PROCEDURE — 93010 ELECTROCARDIOGRAM REPORT: CPT | Performed by: INTERNAL MEDICINE
